# Patient Record
Sex: MALE | Race: WHITE | NOT HISPANIC OR LATINO | Employment: FULL TIME | URBAN - METROPOLITAN AREA
[De-identification: names, ages, dates, MRNs, and addresses within clinical notes are randomized per-mention and may not be internally consistent; named-entity substitution may affect disease eponyms.]

---

## 2023-03-14 PROBLEM — R94.31 ABNORMAL EKG: Status: ACTIVE | Noted: 2023-03-14

## 2023-03-14 PROBLEM — R11.0 NAUSEA: Status: ACTIVE | Noted: 2023-03-14

## 2023-03-14 PROBLEM — G56.22 ENTRAPMENT OF LEFT ULNAR NERVE: Status: ACTIVE | Noted: 2023-03-14

## 2023-03-14 PROBLEM — K82.9 GALLBLADDER PAIN: Status: ACTIVE | Noted: 2023-03-14

## 2023-03-14 PROBLEM — G56.02 LEFT CARPAL TUNNEL SYNDROME: Status: ACTIVE | Noted: 2023-03-14

## 2023-03-14 PROBLEM — K13.79 MOUTH BLEEDING: Status: ACTIVE | Noted: 2023-03-14

## 2023-03-14 PROBLEM — M25.512 LEFT SHOULDER PAIN: Status: ACTIVE | Noted: 2023-03-14

## 2023-03-14 PROBLEM — H02.409 PTOSIS: Status: ACTIVE | Noted: 2023-03-14

## 2023-03-14 PROBLEM — S09.90XA HEAD TRAUMA: Status: ACTIVE | Noted: 2023-03-14

## 2023-03-14 PROBLEM — G62.9 PERIPHERAL POLYNEUROPATHY: Status: ACTIVE | Noted: 2023-03-14

## 2023-03-14 PROBLEM — T14.90XA TRAUMA: Status: ACTIVE | Noted: 2023-03-14

## 2023-03-14 PROBLEM — R07.9 CHEST PAIN: Status: ACTIVE | Noted: 2023-03-14

## 2023-03-14 PROBLEM — G62.9 PERIPHERAL NEUROPATHY: Status: ACTIVE | Noted: 2023-03-14

## 2023-03-14 PROBLEM — M79.622 LEFT UPPER ARM PAIN: Status: ACTIVE | Noted: 2023-03-14

## 2023-03-14 PROBLEM — D22.9 NUMEROUS MOLES: Status: ACTIVE | Noted: 2023-03-14

## 2023-03-14 PROBLEM — R60.9 EDEMA: Status: ACTIVE | Noted: 2023-03-14

## 2023-03-14 PROBLEM — I10 HYPERTENSION: Status: ACTIVE | Noted: 2023-03-14

## 2023-03-14 PROBLEM — R00.0 TACHYCARDIA: Status: ACTIVE | Noted: 2023-03-14

## 2023-03-14 PROBLEM — K57.92 DIVERTICULITIS: Status: ACTIVE | Noted: 2023-03-14

## 2023-03-14 PROBLEM — G90.50 RSD (REFLEX SYMPATHETIC DYSTROPHY): Status: ACTIVE | Noted: 2023-03-14

## 2023-03-14 PROBLEM — K21.9 GERD (GASTROESOPHAGEAL REFLUX DISEASE): Status: ACTIVE | Noted: 2023-03-14

## 2023-03-14 PROBLEM — R42 DIZZINESS: Status: ACTIVE | Noted: 2023-03-14

## 2023-03-14 PROBLEM — M54.9 BACK PAIN: Status: ACTIVE | Noted: 2023-03-14

## 2023-03-14 PROBLEM — E03.9 HYPOTHYROIDISM: Status: ACTIVE | Noted: 2023-03-14

## 2023-03-14 PROBLEM — R00.2 PALPITATIONS: Status: ACTIVE | Noted: 2023-03-14

## 2023-03-14 PROBLEM — U07.1 DISEASE DUE TO SEVERE ACUTE RESPIRATORY SYNDROME CORONAVIRUS 2 (SARS-COV-2): Status: ACTIVE | Noted: 2023-03-14

## 2023-03-14 PROBLEM — J18.9 PNEUMONIA: Status: ACTIVE | Noted: 2023-03-14

## 2023-03-14 PROBLEM — R10.9 ABDOMINAL PAIN: Status: ACTIVE | Noted: 2023-03-14

## 2023-03-14 PROBLEM — M54.2 NECK PAIN: Status: ACTIVE | Noted: 2023-03-14

## 2023-03-14 PROBLEM — F41.0 PANIC ATTACKS: Status: ACTIVE | Noted: 2023-03-14

## 2023-03-14 PROBLEM — R51.9 HEADACHE: Status: ACTIVE | Noted: 2023-03-14

## 2023-03-14 PROBLEM — I73.9 CLAUDICATION (CMS-HCC): Status: ACTIVE | Noted: 2023-03-14

## 2023-03-14 PROBLEM — R31.9 HEMATURIA: Status: ACTIVE | Noted: 2023-03-14

## 2023-03-14 PROBLEM — A69.20 LYME DISEASE: Status: ACTIVE | Noted: 2023-03-14
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
## 2023-03-14 PROBLEM — R05.9 COUGH: Status: ACTIVE | Noted: 2023-03-14

## 2023-03-14 PROBLEM — I48.92 ATRIAL FLUTTER (MULTI): Status: ACTIVE | Noted: 2023-03-14

## 2023-03-14 PROBLEM — E07.9 THYROID DISEASE: Status: ACTIVE | Noted: 2023-03-14

## 2023-03-14 PROBLEM — M79.673 FOOT PAIN: Status: ACTIVE | Noted: 2023-03-14

## 2023-03-14 PROBLEM — K13.70 MOUTH LESION: Status: ACTIVE | Noted: 2023-03-14

## 2023-03-14 PROBLEM — R06.02 SHORTNESS OF BREATH AT REST: Status: ACTIVE | Noted: 2023-03-14

## 2023-03-14 RX ORDER — OMEPRAZOLE 40 MG/1
40 CAPSULE, DELAYED RELEASE ORAL DAILY
COMMUNITY
End: 2023-05-19 | Stop reason: SDUPTHER

## 2023-03-14 RX ORDER — LEVOTHYROXINE SODIUM 75 UG/1
75 TABLET ORAL DAILY
COMMUNITY
End: 2023-04-25 | Stop reason: SDUPTHER

## 2023-03-14 RX ORDER — METOPROLOL SUCCINATE 100 MG/1
100 TABLET, EXTENDED RELEASE ORAL DAILY
COMMUNITY

## 2023-03-15 ENCOUNTER — OFFICE VISIT (OUTPATIENT)
Dept: PRIMARY CARE | Facility: CLINIC | Age: 58
End: 2023-03-15
Payer: COMMERCIAL

## 2023-03-15 VITALS
DIASTOLIC BLOOD PRESSURE: 88 MMHG | HEIGHT: 72 IN | WEIGHT: 228.6 LBS | BODY MASS INDEX: 30.96 KG/M2 | SYSTOLIC BLOOD PRESSURE: 142 MMHG

## 2023-03-15 DIAGNOSIS — K80.63 GALLBLADDER & BILE DUCT STONE, ACUTE CHOLECYSTITIS AND OBSTRUCTION: ICD-10-CM

## 2023-03-15 DIAGNOSIS — E78.00 PURE HYPERCHOLESTEROLEMIA: Primary | ICD-10-CM

## 2023-03-15 DIAGNOSIS — I10 PRIMARY HYPERTENSION: ICD-10-CM

## 2023-03-15 PROCEDURE — 3077F SYST BP >= 140 MM HG: CPT | Performed by: INTERNAL MEDICINE

## 2023-03-15 PROCEDURE — 99214 OFFICE O/P EST MOD 30 MIN: CPT | Performed by: INTERNAL MEDICINE

## 2023-03-15 PROCEDURE — 3079F DIAST BP 80-89 MM HG: CPT | Performed by: INTERNAL MEDICINE

## 2023-03-15 RX ORDER — CLONIDINE HYDROCHLORIDE 0.1 MG/1
0.1 TABLET ORAL 2 TIMES DAILY
Qty: 180 TABLET | Refills: 0 | Status: SHIPPED | OUTPATIENT
Start: 2023-03-15 | End: 2024-05-02 | Stop reason: SDUPTHER

## 2023-03-15 RX ORDER — LOSARTAN POTASSIUM 100 MG/1
100 TABLET ORAL DAILY
Qty: 30 TABLET | Refills: 5 | Status: SHIPPED | OUTPATIENT
Start: 2023-03-15 | End: 2023-11-24 | Stop reason: SDUPTHER

## 2023-03-15 NOTE — PROGRESS NOTES
OFFICE NOTE    NAME OF THE PATIENT: Connor Farr    YOB: 1965    CHIEF COMPLAINT:  This gentleman today came here for multiple medical issues.  His blood pressure is high.  He has a home blood pressure monitor.  He did blood pressure check because he had a headache and he came for follow-up.  He is on blood thinner.  Appetite and weight are okay.  No problem.    PAST MEDICAL HISTORY:  Reviewed on EMR, unchanged.    CURRENT MEDICATIONS:  Reviewed on EMR, unchanged.  List reviewed.    ALLERGIES:  Reviewed on EMR, unchanged.    SOCIAL HISTORY:  Reviewed on EMR, unchanged.  He smokes a few cigarettes.  No history of alcohol or drug abuse.    FAMILY HISTORY:  Reviewed on EMR, unchanged.    REVIEW OF SYSTEMS:  All 12 systems reviewed and pertaining covered in history and physical.    PHYSICAL EXAMINATION  VITAL SIGNS:  As recorded and reviewed from EMR.  RESPIRATORY:  The patient had normal inspirations and expirations.  The breath sounds were equal bilaterally and clear to auscultation.  CARDIOVASCULAR:  The patient had S1 normal, split S2 without obvious rubs, clicks, or murmurs.    GASTROINTESTINAL:  There was no hepatosplenomegaly.  There were no palpable masses and no inguinal nodes.  EXTREMITIES:  Legs had no edema.  NEUROLOGIC:  The patient had normal cranial nerves.  The reflexes, sensory, and motor examination were grossly within normal limits.    LAB WORK:  Laboratory testing discussed.    ASSESSMENT AND PLAN:  Hypertension.  Metoprolol in the morning.  I made losartan 100 mg.  Anticoagulation.  He is on Xarelto.  High cholesterol, on medication.  High blood pressure, episodic.  I am going to add a little clonidine if blood pressure is high.  I shall see him back in about a couple of weeks with repeat blood work.    Kindly review this note in conjunction with EMR.     Subjective   Patient ID: Connor Farr is a 57 y.o. male who presents for Follow-up.      HPI    Review of  Systems    Objective   /88   Ht 1.829 m (6')   Wt 104 kg (228 lb 9.6 oz)   BMI 31.00 kg/m²       Physical Exam    Assessment/Plan   Problem List Items Addressed This Visit    None         Walk in

## 2023-04-25 DIAGNOSIS — E03.9 HYPOTHYROIDISM, UNSPECIFIED TYPE: ICD-10-CM

## 2023-04-25 RX ORDER — LEVOTHYROXINE SODIUM 75 UG/1
75 TABLET ORAL DAILY
Qty: 30 TABLET | Refills: 0 | Status: SHIPPED | OUTPATIENT
Start: 2023-04-25 | End: 2023-06-12 | Stop reason: SDUPTHER

## 2023-05-19 DIAGNOSIS — K80.63 GALLBLADDER & BILE DUCT STONE, ACUTE CHOLECYSTITIS AND OBSTRUCTION: ICD-10-CM

## 2023-05-19 RX ORDER — OMEPRAZOLE 40 MG/1
40 CAPSULE, DELAYED RELEASE ORAL DAILY
Qty: 90 CAPSULE | Refills: 0 | Status: SHIPPED | OUTPATIENT
Start: 2023-05-19 | End: 2023-09-08 | Stop reason: SDUPTHER

## 2023-06-12 ENCOUNTER — OFFICE VISIT (OUTPATIENT)
Dept: PRIMARY CARE | Facility: CLINIC | Age: 58
End: 2023-06-12
Payer: COMMERCIAL

## 2023-06-12 VITALS
BODY MASS INDEX: 32.02 KG/M2 | SYSTOLIC BLOOD PRESSURE: 142 MMHG | HEIGHT: 72 IN | WEIGHT: 236.4 LBS | DIASTOLIC BLOOD PRESSURE: 90 MMHG

## 2023-06-12 DIAGNOSIS — E78.00 PURE HYPERCHOLESTEROLEMIA: ICD-10-CM

## 2023-06-12 DIAGNOSIS — E55.9 VITAMIN D DEFICIENCY: ICD-10-CM

## 2023-06-12 DIAGNOSIS — Z12.5 ENCOUNTER FOR PROSTATE CANCER SCREENING: ICD-10-CM

## 2023-06-12 DIAGNOSIS — G89.29 OTHER CHRONIC PAIN: ICD-10-CM

## 2023-06-12 DIAGNOSIS — M79.673 PAIN OF FOOT, UNSPECIFIED LATERALITY: ICD-10-CM

## 2023-06-12 DIAGNOSIS — E03.9 HYPOTHYROIDISM, UNSPECIFIED TYPE: ICD-10-CM

## 2023-06-12 DIAGNOSIS — R53.83 OTHER FATIGUE: ICD-10-CM

## 2023-06-12 DIAGNOSIS — H44.009 EYE INFECTION, UNSPECIFIED LATERALITY: ICD-10-CM

## 2023-06-12 DIAGNOSIS — I10 PRIMARY HYPERTENSION: ICD-10-CM

## 2023-06-12 DIAGNOSIS — M25.569 KNEE PAIN, UNSPECIFIED CHRONICITY, UNSPECIFIED LATERALITY: ICD-10-CM

## 2023-06-12 DIAGNOSIS — B35.1 NAIL FUNGUS: ICD-10-CM

## 2023-06-12 PROCEDURE — 3077F SYST BP >= 140 MM HG: CPT | Performed by: INTERNAL MEDICINE

## 2023-06-12 PROCEDURE — 99214 OFFICE O/P EST MOD 30 MIN: CPT | Performed by: INTERNAL MEDICINE

## 2023-06-12 PROCEDURE — 3080F DIAST BP >= 90 MM HG: CPT | Performed by: INTERNAL MEDICINE

## 2023-06-12 RX ORDER — EFINACONAZOLE 100 MG/ML
8 SOLUTION TOPICAL DAILY
Qty: 8 ML | Refills: 0 | Status: SHIPPED | OUTPATIENT
Start: 2023-06-12 | End: 2024-05-27 | Stop reason: WASHOUT

## 2023-06-12 RX ORDER — LEVOTHYROXINE SODIUM 75 UG/1
75 TABLET ORAL DAILY
Qty: 90 TABLET | Refills: 0 | Status: SHIPPED | OUTPATIENT
Start: 2023-06-12 | End: 2023-10-05 | Stop reason: SDUPTHER

## 2023-06-12 RX ORDER — DOXYCYCLINE 100 MG/1
100 CAPSULE ORAL 2 TIMES DAILY
Qty: 14 CAPSULE | Refills: 0 | Status: SHIPPED | OUTPATIENT
Start: 2023-06-12 | End: 2023-06-19

## 2023-06-12 RX ORDER — ERYTHROMYCIN 5 MG/G
OINTMENT OPHTHALMIC 4 TIMES DAILY
Qty: 3.5 G | Refills: 0 | Status: SHIPPED | OUTPATIENT
Start: 2023-06-12 | End: 2023-06-19

## 2023-06-12 NOTE — PROGRESS NOTES
OFFICE NOTE    NAME OF THE PATIENT: Connor Farr     YOB: 1965    CHIEF COMPLAINT:  Connor today came here for multiple medical issues.  He is very concerned, a lot of arthritis in the knee, hurting.  Difficulty in walking.  Both feet pain.  He has nail infection.  Follow-up on other conditions.  He got the pain and swelling in left eye lower eyelid.  There is some swelling, looks like a stye.  Itchy and scratchy, not feeling good.  He came for follow-up on various conditions.    PAST MEDICAL HISTORY:  Reviewed on EMR, unchanged.    CURRENT MEDICATIONS:  Reviewed on EMR, unchanged.  Losartan, metoprolol, Synthroid.    ALLERGIES:  Reviewed on EMR, unchanged.    SOCIAL HISTORY:  Reviewed on EMR, unchanged.  He does not smoke, does not drink alcohol.    FAMILY HISTORY:  Reviewed on EMR, unchanged.    REVIEW OF SYSTEMS:  All 12 systems reviewed and pertaining covered in history and physical.    PHYSICAL EXAMINATION  VITAL SIGNS:  As recorded and reviewed from EMR.  EYES:  Left eye lower eyelid looks like a stye.  ENT:  The patient's external ears were normal and the otoscopic examination was negative.  RESPIRATORY:  The patient had normal inspirations and expirations.  The breath sounds were equal bilaterally and clear to auscultation.  CARDIOVASCULAR:  The patient had S1 normal, split S2 without obvious rubs, clicks, or murmurs.    GASTROINTESTINAL:  There was no hepatosplenomegaly.  There were no palpable masses and no inguinal nodes.  EXTREMITIES:  Both the feet Athlete's foot present, nail fungus.  Knee swelling and tenderness.  Movements painful.  NEUROLOGIC:  The patient had normal cranial nerves.  The reflexes, sensory, and motor examination were grossly within normal limits.    LAB WORK:  Laboratory testing discussed.    ASSESSMENT AND PLAN:  Knee pain, arthritis.  Referred to Orthopedic.  Nail fungus.  We will try some nail polish Jublia, see if that helps.  Foot pain.  Podiatrist.  Chronic  pain syndrome.  Referred to Dr. Paulson.  Mayi in the eye.  Erythromycin, doxycycline.  Hypertension, okay.  High cholesterol, stable.  Complete blood work ordered.  I shall see him back in a week after testing.    Kindly review this note in conjunction with EMR.     Subjective   Patient ID: Connor Farr is a 57 y.o. male who presents for No chief complaint on file..      HPI    Review of Systems    Objective   /90   Ht 1.829 m (6')   Wt 107 kg (236 lb 6.4 oz)   BMI 32.06 kg/m²       Physical Exam    Assessment/Plan   Problem List Items Addressed This Visit    None

## 2023-06-23 DIAGNOSIS — B35.1 NAIL FUNGUS: ICD-10-CM

## 2023-06-24 ENCOUNTER — LAB (OUTPATIENT)
Dept: LAB | Facility: LAB | Age: 58
End: 2023-06-24
Payer: COMMERCIAL

## 2023-06-24 DIAGNOSIS — Z12.5 ENCOUNTER FOR PROSTATE CANCER SCREENING: ICD-10-CM

## 2023-06-24 DIAGNOSIS — E03.9 HYPOTHYROIDISM, UNSPECIFIED TYPE: ICD-10-CM

## 2023-06-24 DIAGNOSIS — E55.9 VITAMIN D DEFICIENCY: ICD-10-CM

## 2023-06-24 DIAGNOSIS — R53.83 OTHER FATIGUE: ICD-10-CM

## 2023-06-24 DIAGNOSIS — E78.00 PURE HYPERCHOLESTEROLEMIA: ICD-10-CM

## 2023-06-24 DIAGNOSIS — I10 PRIMARY HYPERTENSION: ICD-10-CM

## 2023-06-24 LAB
ALANINE AMINOTRANSFERASE (SGPT) (U/L) IN SER/PLAS: 20 U/L (ref 10–52)
ALBUMIN (G/DL) IN SER/PLAS: 4.3 G/DL (ref 3.4–5)
ALKALINE PHOSPHATASE (U/L) IN SER/PLAS: 85 U/L (ref 33–120)
ANION GAP IN SER/PLAS: 12 MMOL/L (ref 10–20)
APPEARANCE, URINE: CLEAR
ASPARTATE AMINOTRANSFERASE (SGOT) (U/L) IN SER/PLAS: 19 U/L (ref 9–39)
BILIRUBIN TOTAL (MG/DL) IN SER/PLAS: 0.6 MG/DL (ref 0–1.2)
BILIRUBIN, URINE: NEGATIVE
BLOOD, URINE: NEGATIVE
CALCIDIOL (25 OH VITAMIN D3) (NG/ML) IN SER/PLAS: 12 NG/ML
CALCIUM (MG/DL) IN SER/PLAS: 9.8 MG/DL (ref 8.6–10.6)
CARBON DIOXIDE, TOTAL (MMOL/L) IN SER/PLAS: 28 MMOL/L (ref 21–32)
CHLORIDE (MMOL/L) IN SER/PLAS: 101 MMOL/L (ref 98–107)
CHOLESTEROL (MG/DL) IN SER/PLAS: 235 MG/DL (ref 0–199)
CHOLESTEROL IN HDL (MG/DL) IN SER/PLAS: 35.4 MG/DL
CHOLESTEROL/HDL RATIO: 6.6
COBALAMIN (VITAMIN B12) (PG/ML) IN SER/PLAS: 321 PG/ML (ref 211–911)
COLOR, URINE: YELLOW
CREATININE (MG/DL) IN SER/PLAS: 0.68 MG/DL (ref 0.5–1.3)
ERYTHROCYTE DISTRIBUTION WIDTH (RATIO) BY AUTOMATED COUNT: 12.6 % (ref 11.5–14.5)
ERYTHROCYTE MEAN CORPUSCULAR HEMOGLOBIN CONCENTRATION (G/DL) BY AUTOMATED: 33.7 G/DL (ref 32–36)
ERYTHROCYTE MEAN CORPUSCULAR VOLUME (FL) BY AUTOMATED COUNT: 95 FL (ref 80–100)
ERYTHROCYTES (10*6/UL) IN BLOOD BY AUTOMATED COUNT: 4.91 X10E12/L (ref 4.5–5.9)
GFR MALE: >90 ML/MIN/1.73M2
GLUCOSE (MG/DL) IN SER/PLAS: 121 MG/DL (ref 74–99)
GLUCOSE, URINE: NEGATIVE MG/DL
HEMATOCRIT (%) IN BLOOD BY AUTOMATED COUNT: 46.6 % (ref 41–52)
HEMOGLOBIN (G/DL) IN BLOOD: 15.7 G/DL (ref 13.5–17.5)
KETONES, URINE: NEGATIVE MG/DL
LDL: 160 MG/DL (ref 0–99)
LEUKOCYTE ESTERASE, URINE: NEGATIVE
LEUKOCYTES (10*3/UL) IN BLOOD BY AUTOMATED COUNT: 9.7 X10E9/L (ref 4.4–11.3)
NITRITE, URINE: NEGATIVE
NRBC (PER 100 WBCS) BY AUTOMATED COUNT: 0 /100 WBC (ref 0–0)
PH, URINE: 5 (ref 5–8)
PLATELETS (10*3/UL) IN BLOOD AUTOMATED COUNT: 254 X10E9/L (ref 150–450)
POTASSIUM (MMOL/L) IN SER/PLAS: 5.2 MMOL/L (ref 3.5–5.3)
PROSTATE SPECIFIC ANTIGEN,SCREEN: 0.35 NG/ML (ref 0–4)
PROTEIN TOTAL: 7.3 G/DL (ref 6.4–8.2)
PROTEIN, URINE: NEGATIVE MG/DL
SODIUM (MMOL/L) IN SER/PLAS: 136 MMOL/L (ref 136–145)
SPECIFIC GRAVITY, URINE: 1.01 (ref 1–1.03)
THYROTROPIN (MIU/L) IN SER/PLAS BY DETECTION LIMIT <= 0.05 MIU/L: 1.59 MIU/L (ref 0.44–3.98)
TRIGLYCERIDE (MG/DL) IN SER/PLAS: 199 MG/DL (ref 0–149)
UREA NITROGEN (MG/DL) IN SER/PLAS: 8 MG/DL (ref 6–23)
UROBILINOGEN, URINE: <2 MG/DL (ref 0–1.9)
VLDL: 40 MG/DL (ref 0–40)

## 2023-06-24 PROCEDURE — 85027 COMPLETE CBC AUTOMATED: CPT

## 2023-06-24 PROCEDURE — 82306 VITAMIN D 25 HYDROXY: CPT

## 2023-06-24 PROCEDURE — 84153 ASSAY OF PSA TOTAL: CPT

## 2023-06-24 PROCEDURE — 36415 COLL VENOUS BLD VENIPUNCTURE: CPT

## 2023-06-24 PROCEDURE — 84443 ASSAY THYROID STIM HORMONE: CPT

## 2023-06-24 PROCEDURE — 80053 COMPREHEN METABOLIC PANEL: CPT

## 2023-06-24 PROCEDURE — 82607 VITAMIN B-12: CPT

## 2023-06-24 PROCEDURE — 81003 URINALYSIS AUTO W/O SCOPE: CPT

## 2023-06-24 PROCEDURE — 80061 LIPID PANEL: CPT

## 2023-06-30 ENCOUNTER — TELEPHONE (OUTPATIENT)
Dept: PRIMARY CARE | Facility: CLINIC | Age: 58
End: 2023-06-30
Payer: COMMERCIAL

## 2023-06-30 NOTE — TELEPHONE ENCOUNTER
----- Message from Roseanna Esqueda MD sent at 6/25/2023  9:01 PM EDT -----  Make an appt 5 - 7 days

## 2023-07-03 DIAGNOSIS — G62.89 OTHER POLYNEUROPATHY: ICD-10-CM

## 2023-07-18 ENCOUNTER — OFFICE VISIT (OUTPATIENT)
Dept: PRIMARY CARE | Facility: CLINIC | Age: 58
End: 2023-07-18
Payer: COMMERCIAL

## 2023-07-18 VITALS
DIASTOLIC BLOOD PRESSURE: 82 MMHG | HEIGHT: 74 IN | WEIGHT: 233.6 LBS | BODY MASS INDEX: 29.98 KG/M2 | SYSTOLIC BLOOD PRESSURE: 148 MMHG

## 2023-07-18 DIAGNOSIS — R73.03 PRE-DIABETES: ICD-10-CM

## 2023-07-18 DIAGNOSIS — E78.00 PURE HYPERCHOLESTEROLEMIA: ICD-10-CM

## 2023-07-18 DIAGNOSIS — R10.84 GENERALIZED ABDOMINAL PAIN: ICD-10-CM

## 2023-07-18 PROBLEM — M25.561 KNEE PAIN, BILATERAL: Status: ACTIVE | Noted: 2023-07-18

## 2023-07-18 PROBLEM — R20.0 NUMBNESS: Status: ACTIVE | Noted: 2023-07-18

## 2023-07-18 PROBLEM — M25.562 KNEE PAIN, BILATERAL: Status: ACTIVE | Noted: 2023-07-18

## 2023-07-18 PROCEDURE — 3079F DIAST BP 80-89 MM HG: CPT | Performed by: INTERNAL MEDICINE

## 2023-07-18 PROCEDURE — 3077F SYST BP >= 140 MM HG: CPT | Performed by: INTERNAL MEDICINE

## 2023-07-18 PROCEDURE — 99214 OFFICE O/P EST MOD 30 MIN: CPT | Performed by: INTERNAL MEDICINE

## 2023-07-18 NOTE — PROGRESS NOTES
OFFICE NOTE    NAME OF THE PATIENT: Connor Farr     YOB: 1965    CHIEF COMPLAINT:  This gentleman today came here for abdominal pain, dyspnea on exertion, not feeling good for no good reason.  No fall, trauma, or injury.  He came here for follow-up on various conditions.  It is getting bigger.  Follow-up on blood work.    PAST MEDICAL HISTORY:  Reviewed on EMR, unchanged.    CURRENT MEDICATIONS:  Reviewed on EMR, unchanged.  List reviewed.    ALLERGIES:  Reviewed on EMR, unchanged.    SOCIAL HISTORY:  Reviewed on EMR, unchanged.    FAMILY HISTORY:  Reviewed on EMR, unchanged.    REVIEW OF SYSTEMS:  All 12 systems reviewed and pertaining covered in history and physical.    PHYSICAL EXAMINATION  VITAL SIGNS:  As recorded and reviewed from EMR.  EYES:  The patient's sclerae white.  The pupils were equal and round.  ENT:  The patient's external ears were normal and the otoscopic examination was negative.  RESPIRATORY:  The patient had normal inspirations and expirations.  The breath sounds were equal bilaterally and clear to auscultation.  CARDIOVASCULAR:  The patient had S1 normal, split S2 without obvious rubs, clicks, or murmurs.    GASTROINTESTINAL:  There was no hepatosplenomegaly.  There were no palpable masses and no inguinal nodes.  Bowel sounds present.  EXTREMITIES:  Legs had no edema.  NEUROLOGIC:  The patient had normal cranial nerves.  The reflexes, sensory, and motor examination were grossly within normal limits.    LAB WORK:  Laboratory testing discussed.    ASSESSMENT AND PLAN:  Abdominal distension, not feeling good.  I ordered CT scan of the abdomen and pelvis.  Hyperglycemia and possible diabetes.  We will check hemoglobin A1c.  Low vitamin D.  Given vitamin D.  Anxiety and stress, okay.  Follow up in a week after test.      Kindly review this note in conjunction with EMR.   Subjective   Patient ID: Connor Farr is a 57 y.o. male who presents for Follow-up (Stomach  "swollen).      HPI    Review of Systems    Objective   /82   Ht 1.88 m (6' 2\")   Wt 106 kg (233 lb 9.6 oz)   BMI 29.99 kg/m²       Physical Exam    Assessment/Plan   Problem List Items Addressed This Visit    None        "

## 2023-08-28 ENCOUNTER — LAB (OUTPATIENT)
Dept: LAB | Facility: LAB | Age: 58
End: 2023-08-28
Payer: COMMERCIAL

## 2023-08-28 DIAGNOSIS — E78.00 PURE HYPERCHOLESTEROLEMIA: ICD-10-CM

## 2023-08-28 DIAGNOSIS — R73.03 PRE-DIABETES: ICD-10-CM

## 2023-08-28 LAB
ALANINE AMINOTRANSFERASE (SGPT) (U/L) IN SER/PLAS: 19 U/L (ref 10–52)
ALBUMIN (G/DL) IN SER/PLAS: 4.6 G/DL (ref 3.4–5)
ALKALINE PHOSPHATASE (U/L) IN SER/PLAS: 92 U/L (ref 33–120)
ANION GAP IN SER/PLAS: 15 MMOL/L (ref 10–20)
ASPARTATE AMINOTRANSFERASE (SGOT) (U/L) IN SER/PLAS: 17 U/L (ref 9–39)
BILIRUBIN TOTAL (MG/DL) IN SER/PLAS: 0.7 MG/DL (ref 0–1.2)
CALCIUM (MG/DL) IN SER/PLAS: 10 MG/DL (ref 8.6–10.6)
CARBON DIOXIDE, TOTAL (MMOL/L) IN SER/PLAS: 27 MMOL/L (ref 21–32)
CHLORIDE (MMOL/L) IN SER/PLAS: 98 MMOL/L (ref 98–107)
CREATININE (MG/DL) IN SER/PLAS: 0.82 MG/DL (ref 0.5–1.3)
ESTIMATED AVERAGE GLUCOSE FOR HBA1C: 134 MG/DL
GFR MALE: >90 ML/MIN/1.73M2
GLUCOSE (MG/DL) IN SER/PLAS: 90 MG/DL (ref 74–99)
HEMOGLOBIN A1C/HEMOGLOBIN TOTAL IN BLOOD: 6.3 %
POTASSIUM (MMOL/L) IN SER/PLAS: 4.4 MMOL/L (ref 3.5–5.3)
PROTEIN TOTAL: 7.7 G/DL (ref 6.4–8.2)
SODIUM (MMOL/L) IN SER/PLAS: 136 MMOL/L (ref 136–145)
UREA NITROGEN (MG/DL) IN SER/PLAS: 8 MG/DL (ref 6–23)

## 2023-08-28 PROCEDURE — 80053 COMPREHEN METABOLIC PANEL: CPT

## 2023-08-28 PROCEDURE — 83036 HEMOGLOBIN GLYCOSYLATED A1C: CPT

## 2023-08-28 PROCEDURE — 36415 COLL VENOUS BLD VENIPUNCTURE: CPT

## 2023-08-31 ENCOUNTER — TELEPHONE (OUTPATIENT)
Dept: PRIMARY CARE | Facility: CLINIC | Age: 58
End: 2023-08-31
Payer: COMMERCIAL

## 2023-08-31 NOTE — TELEPHONE ENCOUNTER
voicemail box hasn't been set up yet, just wanted to let him know his HA1C was high and Dr. Esqueda wanted to see him in a week.

## 2023-09-08 ENCOUNTER — TELEPHONE (OUTPATIENT)
Dept: PRIMARY CARE | Facility: CLINIC | Age: 58
End: 2023-09-08
Payer: COMMERCIAL

## 2023-09-08 DIAGNOSIS — K80.63 GALLBLADDER & BILE DUCT STONE, ACUTE CHOLECYSTITIS AND OBSTRUCTION: ICD-10-CM

## 2023-09-08 RX ORDER — OMEPRAZOLE 40 MG/1
40 CAPSULE, DELAYED RELEASE ORAL DAILY
Qty: 90 CAPSULE | Refills: 0 | Status: SHIPPED | OUTPATIENT
Start: 2023-09-08 | End: 2024-01-19 | Stop reason: SDUPTHER

## 2023-09-22 ENCOUNTER — TELEMEDICINE (OUTPATIENT)
Dept: PRIMARY CARE | Facility: CLINIC | Age: 58
End: 2023-09-22
Payer: COMMERCIAL

## 2023-09-22 DIAGNOSIS — F41.9 ANXIETY: ICD-10-CM

## 2023-09-22 DIAGNOSIS — K76.0 FATTY LIVER: ICD-10-CM

## 2023-09-22 DIAGNOSIS — J44.9 CHRONIC OBSTRUCTIVE PULMONARY DISEASE, UNSPECIFIED COPD TYPE (MULTI): ICD-10-CM

## 2023-09-22 DIAGNOSIS — K57.90 DIVERTICULOSIS: Primary | ICD-10-CM

## 2023-09-22 DIAGNOSIS — I10 HYPERTENSION, UNSPECIFIED TYPE: ICD-10-CM

## 2023-09-22 PROCEDURE — 99213 OFFICE O/P EST LOW 20 MIN: CPT | Performed by: INTERNAL MEDICINE

## 2023-09-22 ASSESSMENT — ENCOUNTER SYMPTOMS
LOSS OF SENSATION IN FEET: 0
OCCASIONAL FEELINGS OF UNSTEADINESS: 0
DEPRESSION: 0

## 2023-09-22 NOTE — PROGRESS NOTES
Subjective   Patient ID: Connor Farr is a 57 y.o. male who presents for Follow-up (multiple medical issues).    Connor Farr today came here for multiple medical issues.  He is on virtual visit.  Abdominal pain is much better, feeling okay.  No problem.  He had a blood work taken.  He came for follow-up on CT scan.    I have personally reviewed the patient's Past Medical History, Medications, Allergies, Social History, and Family History in the EMR.    Review of Systems   All other systems reviewed and are negative.    Objective   There were no vitals taken for this visit.    Physical Exam  Virtual exam.  Appetite and weight are okay.  Legs had no edema.  Feeling okay.    LAB WORK: Laboratory testing discussed.    Assessment/Plan   Problem List Items Addressed This Visit          Cardiac and Vasculature    Hypertension     Other Visit Diagnoses       Diverticulosis    -  Primary    Fatty liver        Anxiety        Chronic obstructive pulmonary disease, unspecified COPD type (CMS/HCC)            1. CT scan revealed diverticulosis.  Monitor.  Colonoscopy.  2. Fatty liver.  Monitor.  3. Blood pressure, okay.  4. Anxiety question answered.  5. COPD.  Monitor.  4. Follow-up appointment with me in two months.    Scribe Attestation  By signing my name below, I, Jack Stuart   attest that this documentation has been prepared under the direction and in the presence of Roseanna Esqueda MD.

## 2023-10-05 DIAGNOSIS — E03.9 HYPOTHYROIDISM, UNSPECIFIED TYPE: ICD-10-CM

## 2023-10-05 RX ORDER — LEVOTHYROXINE SODIUM 75 UG/1
75 TABLET ORAL DAILY
Qty: 90 TABLET | Refills: 0 | Status: SHIPPED | OUTPATIENT
Start: 2023-10-05 | End: 2024-01-19 | Stop reason: SDUPTHER

## 2023-10-17 ENCOUNTER — OFFICE VISIT (OUTPATIENT)
Dept: PAIN MEDICINE | Facility: CLINIC | Age: 58
End: 2023-10-17
Payer: COMMERCIAL

## 2023-10-17 VITALS
BODY MASS INDEX: 28.23 KG/M2 | DIASTOLIC BLOOD PRESSURE: 87 MMHG | HEIGHT: 74 IN | RESPIRATION RATE: 16 BRPM | SYSTOLIC BLOOD PRESSURE: 128 MMHG | WEIGHT: 220 LBS | HEART RATE: 87 BPM

## 2023-10-17 DIAGNOSIS — G62.1 ALCOHOL-INDUCED POLYNEUROPATHY (MULTI): Primary | ICD-10-CM

## 2023-10-17 PROCEDURE — 3079F DIAST BP 80-89 MM HG: CPT | Performed by: PHYSICAL MEDICINE & REHABILITATION

## 2023-10-17 PROCEDURE — 3074F SYST BP LT 130 MM HG: CPT | Performed by: PHYSICAL MEDICINE & REHABILITATION

## 2023-10-17 PROCEDURE — 99213 OFFICE O/P EST LOW 20 MIN: CPT | Performed by: PHYSICAL MEDICINE & REHABILITATION

## 2023-10-17 ASSESSMENT — PAIN SCALES - GENERAL: PAINLEVEL: 7

## 2023-10-17 NOTE — ASSESSMENT & PLAN NOTE
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  Unfortunately patient had just started his gabapentin about a week ago rather than when I had seen him about 6 weeks ago however he is already starting to have improvement with the 300 mg at night.  I gave him the titration schedule again to hopefully get him up to 600 mg 3 times a day and I am optimistic that he will continue to have benefit as he titrates this medication up.  I did reassure him that this medication is generally overall very safe and does not have any long-term significant negative facts on any of his organs or have any issues with tolerance and/or dependence.  He may follow-up with me as needed

## 2023-10-17 NOTE — PROGRESS NOTES
Subjective   Patient ID: Connor Farr is a 57 y.o. male who presents for Leg Pain.  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  Since last visit he had started gabapentin however he only started about a week ago he is currently at 300 mg at night.  He did lose the titration schedule we have given him.  However even with 300 mg at night he is actually starting to notice some relief especially when he wakes up in the morning as he is not having a significant feelings of walking on glass.  Otherwise similar symptoms as previous evaluation though again as above improved on initial dosing of gabapentin.                  Review of Systems   All other systems reviewed and are negative.       Current Outpatient Medications   Medication Instructions    cloNIDine (CATAPRES) 0.1 mg, oral, 2 times daily    efinaconazole (Jublia) 10 % solution with applicator 8 mL, topical (top), Daily    levothyroxine (SYNTHROID, LEVOXYL) 75 mcg, oral, Daily    losartan (COZAAR) 100 mg, oral, Daily    metoprolol succinate XL (TOPROL-XL) 100 mg, oral, Daily    omeprazole (PRILOSEC) 40 mg, oral, Daily        Past Medical History:   Diagnosis Date    Acute sinusitis, unspecified 08/29/2013    Acute sinusitis    Body mass index (BMI) 27.0-27.9, adult     BMI 27.0-27.9,adult    Fatty liver     High cholesterol     Hypertension     Personal history of other diseases of the respiratory system 11/26/2013    History of acute bronchitis    Personal history of other specified conditions 08/01/2013    History of abdominal pain        No past surgical history on file.     Family History   Problem Relation Name Age of Onset    Other (MTHFR mutation) Sister      Hypertension Other FHx         essential    Heart disease Other FHx     Diabetes type II Other FHx         Allergies   Allergen Reactions    Cephalexin Unknown        Objective     Vitals:    10/17/23 1509   BP: 128/87   Pulse:  87   Resp: 16        Physical Exam    GENERAL EXAM  Vital Signs: Vital signs to include heart rate, respiration rate, blood pressure, and temperature were reviewed.  General Appearance:  Awake, alert, healthy appearing, well developed, No acute distress.  Head: Normocephalic without evidence of head injury.  Neck: The appearance of the neck was normal without swelling with a midline trachea.  Eyes: The eyelids and eyebrows exhibited no abnormalities.  Pupils were not pin-point.  Sclera was without icterus.  Lungs: Respiration rhythm and depth was normal.  Respiratory movements were normal without labored breathing.  Cardiovascular: No peripheral edema was present.    Neurological: Patient was oriented to time, place, and person.  Speech was normal.  Balance, gait, and stance were unremarkable.    Psychiatric: Appearance was normal with appropriate dress.  Mood was euthymic and affect was normal.  Skin: Affected regions were without ecchymosis or skin lesions.        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  Unfortunately patient had just started his gabapentin about a week ago rather than when I had seen him about 6 weeks ago however he is already starting to have improvement with the 300 mg at night.  I gave him the titration schedule again to hopefully get him up to 600 mg 3 times a day and I am optimistic that he will continue to have benefit as he titrates this medication up.  I did reassure him that this medication is generally overall very safe and does not have any long-term significant negative facts on any of his organs or have any issues with tolerance and/or dependence.  He may follow-up with me as needed

## 2023-11-21 ENCOUNTER — TELEMEDICINE (OUTPATIENT)
Dept: PRIMARY CARE | Facility: CLINIC | Age: 58
End: 2023-11-21
Payer: COMMERCIAL

## 2023-11-21 DIAGNOSIS — J45.909 ACUTE ASTHMATIC BRONCHITIS (HHS-HCC): Primary | ICD-10-CM

## 2023-11-21 PROCEDURE — 99213 OFFICE O/P EST LOW 20 MIN: CPT | Performed by: INTERNAL MEDICINE

## 2023-11-22 NOTE — PROGRESS NOTES
Subjective   Patient ID: Connor Farr is a 57 y.o. male who presents for virtual visit and Follow-up (various conditions).    Connor Farr today came here for virtual visit.  He has cough, yellow sputum, sinus congestion, bronchitis, headache going on for the last several days.  Over-the-counter medications are not helping.  He came here for follow-up on various conditions.    I have personally reviewed the patient's Past Medical History, Medications, Allergies, Social History, and Family History in the EMR.    Review of Systems   All other systems reviewed and are negative.    Objective   Virtual exam. There were no vitals taken for this visit.    Physical Exam  HENT:      Nose: Congestion present.      Comments: Postnasal drip.     Mouth/Throat:      Comments: Throat congested.  Pulmonary:      Breath sounds: Wheezing present.     LAB WORK: Laboratory testing discussed.  Assessment/Plan   Problem List Items Addressed This Visit    None  Visit Diagnoses         Codes    Acute asthmatic bronchitis    -  Primary J45.909        Acute asthmatic bronchitis and cough.  Levaquin, Claritin, Robitussin, and Flonase.  ______.  Follow up in two weeks if not better.    Scribe Attestation  By signing my name below, I, Jack Stuart attest that this documentation has been prepared under the direction and in the presence of Roseanna Esqueda MD.

## 2023-11-24 DIAGNOSIS — I10 PRIMARY HYPERTENSION: ICD-10-CM

## 2023-11-24 DIAGNOSIS — J45.909 ACUTE ASTHMATIC BRONCHITIS (HHS-HCC): ICD-10-CM

## 2023-11-24 RX ORDER — LEVOFLOXACIN 250 MG/1
250 TABLET ORAL DAILY
Qty: 10 TABLET | Refills: 0 | Status: SHIPPED | OUTPATIENT
Start: 2023-11-24 | End: 2023-12-04

## 2023-11-24 RX ORDER — LORATADINE 10 MG/1
10 TABLET ORAL DAILY
Qty: 30 TABLET | Refills: 0 | Status: SHIPPED | OUTPATIENT
Start: 2023-11-24 | End: 2024-05-02 | Stop reason: SDUPTHER

## 2023-11-24 RX ORDER — LOSARTAN POTASSIUM 100 MG/1
100 TABLET ORAL DAILY
Qty: 30 TABLET | Refills: 5 | Status: SHIPPED | OUTPATIENT
Start: 2023-11-24 | End: 2024-05-02 | Stop reason: SDUPTHER

## 2023-11-24 RX ORDER — FLUTICASONE FUROATE 27.5 UG/1
1 SPRAY, METERED NASAL
Qty: 10 G | Refills: 0 | Status: SHIPPED | OUTPATIENT
Start: 2023-11-24 | End: 2024-05-27 | Stop reason: WASHOUT

## 2023-11-24 RX ORDER — DEXTROMETHORPHAN HYDROBROMIDE, GUAIFENESIN 20; 400 MG/20ML; MG/20ML
5 SOLUTION ORAL 2 TIMES DAILY
Qty: 118 ML | Refills: 0 | Status: SHIPPED | OUTPATIENT
Start: 2023-11-24 | End: 2024-05-27 | Stop reason: WASHOUT

## 2023-11-28 ENCOUNTER — APPOINTMENT (OUTPATIENT)
Dept: PAIN MEDICINE | Facility: CLINIC | Age: 58
End: 2023-11-28
Payer: COMMERCIAL

## 2023-12-14 ENCOUNTER — APPOINTMENT (OUTPATIENT)
Dept: PAIN MEDICINE | Facility: CLINIC | Age: 58
End: 2023-12-14
Payer: COMMERCIAL

## 2024-01-02 ENCOUNTER — OFFICE VISIT (OUTPATIENT)
Dept: PAIN MEDICINE | Facility: CLINIC | Age: 59
End: 2024-01-02
Payer: COMMERCIAL

## 2024-01-02 VITALS — SYSTOLIC BLOOD PRESSURE: 155 MMHG | RESPIRATION RATE: 18 BRPM | DIASTOLIC BLOOD PRESSURE: 97 MMHG | HEART RATE: 80 BPM

## 2024-01-02 DIAGNOSIS — G62.1 ALCOHOL-INDUCED POLYNEUROPATHY (MULTI): Primary | ICD-10-CM

## 2024-01-02 PROCEDURE — 3077F SYST BP >= 140 MM HG: CPT | Performed by: PHYSICAL MEDICINE & REHABILITATION

## 2024-01-02 PROCEDURE — 3080F DIAST BP >= 90 MM HG: CPT | Performed by: PHYSICAL MEDICINE & REHABILITATION

## 2024-01-02 PROCEDURE — 99214 OFFICE O/P EST MOD 30 MIN: CPT | Performed by: PHYSICAL MEDICINE & REHABILITATION

## 2024-01-02 RX ORDER — GABAPENTIN 300 MG/1
CAPSULE ORAL
Qty: 210 CAPSULE | Refills: 2 | Status: SHIPPED | OUTPATIENT
Start: 2024-01-02 | End: 2024-05-27 | Stop reason: WASHOUT

## 2024-01-02 ASSESSMENT — PAIN SCALES - GENERAL: PAINLEVEL: 7

## 2024-01-02 NOTE — ASSESSMENT & PLAN NOTE
58-year-old male with alcohol induced peripheral neuropathy responded fairly well to gabapentin.  I think it be reasonable to get him back on his gabapentin and increase the evening dosing.  Our plan for today:  - Gabapentin 600 in the morning 600 in the afternoon and increase to 900 mg at night.  We can further increase the nighttime dose to 1200 mg.  - Once again counseled patient on the importance of smoking cessation.    Patient will follow-up with me as needed or annually.

## 2024-01-02 NOTE — PROGRESS NOTES
Subjective   Patient ID: Connor Farr is a 58 y.o. male who presents for Leg Pain.  HPI    58-year-old male with history of alcohol induced peripheral neuropathy in his bilateral lower extremities following up today.  Patient has been titrated up on gabapentin to 600 mg 3 times a day which significantly helped his pain however he has recently run out of medication and has been without it for a period of time which is resulted in an increase in his pain.  He overall is tolerated the gabapentin fairly well with only a little bit of drowsiness during the day that he is able to tolerate.  Otherwise symptoms similar to previous evaluation                Monitoring and compliance:    ORT:    PDUQ:    Office Agreement:      Review of Systems     Current Outpatient Medications   Medication Instructions    cloNIDine (CATAPRES) 0.1 mg, oral, 2 times daily    dextromethorphan-guaifenesin (Robitussin Cough-Chest Billy DM) 5-100 mg/5 mL liquid 5 mL, oral, 2 times daily    efinaconazole (Jublia) 10 % solution with applicator 8 mL, topical (top), Daily    fluticasone (Flonase Sensimist) 27.5 mcg/actuation nasal spray 1 spray, Each Nostril, Daily RT    gabapentin (Neurontin) 300 mg capsule Take 600mg in the morning and afternoon and take 900mg at bedtime.    levothyroxine (SYNTHROID, LEVOXYL) 75 mcg, oral, Daily    loratadine (CLARITIN) 10 mg, oral, Daily    losartan (COZAAR) 100 mg, oral, Daily    metoprolol succinate XL (TOPROL-XL) 100 mg, oral, Daily    omeprazole (PRILOSEC) 40 mg, oral, Daily        Past Medical History:   Diagnosis Date    Acute sinusitis, unspecified 08/29/2013    Acute sinusitis    Body mass index (BMI) 27.0-27.9, adult     BMI 27.0-27.9,adult    Fatty liver     High cholesterol     Hypertension     Personal history of other diseases of the respiratory system 11/26/2013    History of acute bronchitis    Personal history of other specified conditions 08/01/2013    History of abdominal pain        No past  surgical history on file.     Family History   Problem Relation Name Age of Onset    Other (MTHFR mutation) Sister      Hypertension Other FHx         essential    Heart disease Other FHx     Diabetes type II Other FHx         Allergies   Allergen Reactions    Cephalexin Unknown        Objective     Vitals:    01/02/24 1429   BP: (!) 155/97   Pulse: 80   Resp: 18        Physical Exam    GENERAL EXAM  Vital Signs: Vital signs to include heart rate, respiration rate, blood pressure, and temperature were reviewed.  General Appearance:  Awake, alert, healthy appearing, well developed, No acute distress.  Head: Normocephalic without evidence of head injury.  Neck: The appearance of the neck was normal without swelling with a midline trachea.  Eyes: The eyelids and eyebrows exhibited no abnormalities.  Pupils were not pin-point.  Sclera was without icterus.  Lungs: Respiration rhythm and depth was normal.  Respiratory movements were normal without labored breathing.  Cardiovascular: No peripheral edema was present.    Neurological: Patient was oriented to time, place, and person.  Speech was normal.  Balance, gait, and stance were unremarkable.    Psychiatric: Appearance was normal with appropriate dress.  Mood was euthymic and affect was normal.  Skin: Affected regions were without ecchymosis or skin lesions.        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  I think it be reasonable to get him back on his gabapentin and increase the evening dosing.  Our plan for today:  - Gabapentin 600 in the morning 600 in the afternoon and increase to 900 mg at night.  We can further increase the nighttime dose to 1200 mg.  - Once again counseled patient on the importance of smoking cessation.    Patient will follow-up  with me as needed or annually.         Relevant Medications    gabapentin (Neurontin) 300 mg capsule            This note was generated with the aid of dictation software, there may be typos despite my attempts at proofreading.

## 2024-01-19 DIAGNOSIS — K80.63 GALLBLADDER & BILE DUCT STONE, ACUTE CHOLECYSTITIS AND OBSTRUCTION: ICD-10-CM

## 2024-01-19 DIAGNOSIS — E03.9 HYPOTHYROIDISM, UNSPECIFIED TYPE: ICD-10-CM

## 2024-01-19 RX ORDER — LEVOTHYROXINE SODIUM 75 UG/1
75 TABLET ORAL DAILY
Qty: 90 TABLET | Refills: 0 | Status: SHIPPED | OUTPATIENT
Start: 2024-01-19 | End: 2024-05-02 | Stop reason: SDUPTHER

## 2024-01-19 RX ORDER — OMEPRAZOLE 40 MG/1
40 CAPSULE, DELAYED RELEASE ORAL DAILY
Qty: 90 CAPSULE | Refills: 0 | Status: SHIPPED | OUTPATIENT
Start: 2024-01-19 | End: 2024-05-02 | Stop reason: SDUPTHER

## 2024-02-19 ENCOUNTER — LAB (OUTPATIENT)
Dept: LAB | Facility: LAB | Age: 59
End: 2024-02-19
Payer: COMMERCIAL

## 2024-02-19 ENCOUNTER — OFFICE VISIT (OUTPATIENT)
Dept: PRIMARY CARE | Facility: CLINIC | Age: 59
End: 2024-02-19
Payer: COMMERCIAL

## 2024-02-19 VITALS
WEIGHT: 236.4 LBS | SYSTOLIC BLOOD PRESSURE: 148 MMHG | HEIGHT: 74 IN | DIASTOLIC BLOOD PRESSURE: 82 MMHG | BODY MASS INDEX: 30.34 KG/M2

## 2024-02-19 DIAGNOSIS — R06.02 SHORTNESS OF BREATH: Primary | ICD-10-CM

## 2024-02-19 DIAGNOSIS — J45.909 ACUTE ASTHMATIC BRONCHITIS (HHS-HCC): ICD-10-CM

## 2024-02-19 DIAGNOSIS — I10 HYPERTENSION, UNSPECIFIED TYPE: ICD-10-CM

## 2024-02-19 DIAGNOSIS — K21.9 GASTROESOPHAGEAL REFLUX DISEASE WITHOUT ESOPHAGITIS: ICD-10-CM

## 2024-02-19 DIAGNOSIS — Z13.220 LIPID SCREENING: ICD-10-CM

## 2024-02-19 DIAGNOSIS — R05.9 COUGH, UNSPECIFIED TYPE: ICD-10-CM

## 2024-02-19 LAB
ALBUMIN SERPL BCP-MCNC: 4.3 G/DL (ref 3.4–5)
ALP SERPL-CCNC: 91 U/L (ref 33–120)
ALT SERPL W P-5'-P-CCNC: 21 U/L (ref 10–52)
ANION GAP SERPL CALC-SCNC: 9 MMOL/L (ref 10–20)
APPEARANCE UR: CLEAR
AST SERPL W P-5'-P-CCNC: 17 U/L (ref 9–39)
BILIRUB SERPL-MCNC: 0.4 MG/DL (ref 0–1.2)
BILIRUB UR STRIP.AUTO-MCNC: NEGATIVE MG/DL
BUN SERPL-MCNC: 10 MG/DL (ref 6–23)
CALCIUM SERPL-MCNC: 9.9 MG/DL (ref 8.6–10.6)
CHLORIDE SERPL-SCNC: 101 MMOL/L (ref 98–107)
CO2 SERPL-SCNC: 32 MMOL/L (ref 21–32)
COLOR UR: NORMAL
CREAT SERPL-MCNC: 0.79 MG/DL (ref 0.5–1.3)
EGFRCR SERPLBLD CKD-EPI 2021: >90 ML/MIN/1.73M*2
ERYTHROCYTE [DISTWIDTH] IN BLOOD BY AUTOMATED COUNT: 13.3 % (ref 11.5–14.5)
GLUCOSE SERPL-MCNC: 99 MG/DL (ref 74–99)
GLUCOSE UR STRIP.AUTO-MCNC: NORMAL MG/DL
HCT VFR BLD AUTO: 46.2 % (ref 41–52)
HGB BLD-MCNC: 15.6 G/DL (ref 13.5–17.5)
KETONES UR STRIP.AUTO-MCNC: NEGATIVE MG/DL
LEUKOCYTE ESTERASE UR QL STRIP.AUTO: NEGATIVE
MCH RBC QN AUTO: 32.4 PG (ref 26–34)
MCHC RBC AUTO-ENTMCNC: 33.8 G/DL (ref 32–36)
MCV RBC AUTO: 96 FL (ref 80–100)
NITRITE UR QL STRIP.AUTO: NEGATIVE
NRBC BLD-RTO: 0 /100 WBCS (ref 0–0)
PH UR STRIP.AUTO: 5.5 [PH]
PLATELET # BLD AUTO: 296 X10*3/UL (ref 150–450)
POTASSIUM SERPL-SCNC: 4.3 MMOL/L (ref 3.5–5.3)
PROT SERPL-MCNC: 7.5 G/DL (ref 6.4–8.2)
PROT UR STRIP.AUTO-MCNC: NEGATIVE MG/DL
RBC # BLD AUTO: 4.81 X10*6/UL (ref 4.5–5.9)
RBC # UR STRIP.AUTO: NEGATIVE /UL
SODIUM SERPL-SCNC: 138 MMOL/L (ref 136–145)
SP GR UR STRIP.AUTO: 1.02
TSH SERPL-ACNC: 2.11 MIU/L (ref 0.44–3.98)
UROBILINOGEN UR STRIP.AUTO-MCNC: NORMAL MG/DL
WBC # BLD AUTO: 10.8 X10*3/UL (ref 4.4–11.3)

## 2024-02-19 PROCEDURE — 36415 COLL VENOUS BLD VENIPUNCTURE: CPT

## 2024-02-19 PROCEDURE — 80053 COMPREHEN METABOLIC PANEL: CPT

## 2024-02-19 PROCEDURE — 3079F DIAST BP 80-89 MM HG: CPT | Performed by: INTERNAL MEDICINE

## 2024-02-19 PROCEDURE — 81003 URINALYSIS AUTO W/O SCOPE: CPT

## 2024-02-19 PROCEDURE — 99214 OFFICE O/P EST MOD 30 MIN: CPT | Performed by: INTERNAL MEDICINE

## 2024-02-19 PROCEDURE — 3077F SYST BP >= 140 MM HG: CPT | Performed by: INTERNAL MEDICINE

## 2024-02-19 PROCEDURE — 85027 COMPLETE CBC AUTOMATED: CPT

## 2024-02-19 PROCEDURE — 84443 ASSAY THYROID STIM HORMONE: CPT

## 2024-02-19 RX ORDER — ALBUTEROL SULFATE AND BUDESONIDE 90; 80 UG/1; UG/1
2 AEROSOL, METERED RESPIRATORY (INHALATION) EVERY 6 HOURS PRN
Qty: 10.7 G | Refills: 0 | Status: SHIPPED | OUTPATIENT
Start: 2024-02-19 | End: 2024-05-27 | Stop reason: WASHOUT

## 2024-02-19 ASSESSMENT — ENCOUNTER SYMPTOMS
OCCASIONAL FEELINGS OF UNSTEADINESS: 0
DEPRESSION: 0
LOSS OF SENSATION IN FEET: 0

## 2024-02-20 NOTE — PROGRESS NOTES
"Subjective   Patient ID: Connor Farr is a 58 y.o. male who presents for Follow-up (Various conditions) and Multiple medical issues.    This gentleman today came here for multiple medical issues.  Shortness of breath, wheezing, not feeling good.  At times his respiration gets stuck and he coughs and he cannot breathe.  He is concerned with that.  He came for follow-up on various conditions.  Weak, tired, fatigued, exhausted.    I have personally reviewed the patient's Past Medical History, Medications, Allergies, Social History, and Family History in the EMR.    Review of Systems   All other systems reviewed and are negative.    Objective   /82   Ht 1.88 m (6' 2\")   Wt 107 kg (236 lb 6.4 oz)   BMI 30.35 kg/m²     Physical Exam  Vitals reviewed.   Cardiovascular:      Heart sounds: Normal heart sounds, S1 normal and S2 normal. No murmur heard.     No friction rub.   Pulmonary:      Effort: Pulmonary effort is normal.      Breath sounds: Normal air entry. Wheezing (bilateral) present.   Abdominal:      Palpations: There is no hepatomegaly, splenomegaly or mass.   Musculoskeletal:      Right lower leg: No edema.      Left lower leg: No edema.   Lymphadenopathy:      Lower Body: No right inguinal adenopathy. No left inguinal adenopathy.   Neurological:      Cranial Nerves: Cranial nerves 2-12 are intact.      Sensory: No sensory deficit.      Motor: Motor function is intact.      Deep Tendon Reflexes: Reflexes are normal and symmetric.     LAB WORK: Laboratory testing discussed.    Assessment/Plan   Problem List Items Addressed This Visit             ICD-10-CM       Cardiac and Vasculature    Hypertension I10    Relevant Orders    CBC (Completed)    Thyroid Stimulating Hormone (Completed)    Urinalysis with Reflex Microscopic (Completed)       Gastrointestinal and Abdominal    GERD (gastroesophageal reflux disease) K21.9       Pulmonary and Pneumonias    Cough R05.9    Relevant Orders    XR chest 2 views "     Other Visit Diagnoses         Codes    Shortness of breath    -  Primary R06.02    Acute asthmatic bronchitis     J45.909    Relevant Medications    albuterol-budesonide (Airsupra) 90-80 mcg/actuation inhaler    Lipid screening     Z13.220    Relevant Orders    Comprehensive Metabolic Panel (Completed)        1. Shortness of breath, cough, COPD.  I gave him some inhaler.  2. Hypertension, okay.  3. High cholesterol, stable.  4. GERD, on PPI.  5. Cough, congestion, shortness of breath, smoking.  Chest x-ray ordered.  6. Follow-up appointment with me in a couple of weeks after tests.    Scribe Attestation  By signing my name below, I, Jack Michael attest that this documentation has been prepared under the direction and in the presence of Roseanna Esqueda MD.

## 2024-02-29 ENCOUNTER — APPOINTMENT (OUTPATIENT)
Dept: CARDIOLOGY | Facility: CLINIC | Age: 59
End: 2024-02-29
Payer: COMMERCIAL

## 2024-03-08 ENCOUNTER — TELEMEDICINE (OUTPATIENT)
Dept: PRIMARY CARE | Facility: CLINIC | Age: 59
End: 2024-03-08
Payer: COMMERCIAL

## 2024-03-08 DIAGNOSIS — E55.9 VITAMIN D DEFICIENCY: ICD-10-CM

## 2024-03-08 DIAGNOSIS — R53.83 OTHER FATIGUE: ICD-10-CM

## 2024-03-08 DIAGNOSIS — G89.29 OTHER CHRONIC PAIN: ICD-10-CM

## 2024-03-08 DIAGNOSIS — I10 HYPERTENSION, UNSPECIFIED TYPE: ICD-10-CM

## 2024-03-08 DIAGNOSIS — R41.3 MEMORY LOSS: ICD-10-CM

## 2024-03-08 DIAGNOSIS — E78.00 PURE HYPERCHOLESTEROLEMIA: ICD-10-CM

## 2024-03-08 DIAGNOSIS — M62.81 NEUROLOGICAL MUSCLE WEAKNESS: Primary | ICD-10-CM

## 2024-03-08 PROCEDURE — 99213 OFFICE O/P EST LOW 20 MIN: CPT | Performed by: INTERNAL MEDICINE

## 2024-03-08 ASSESSMENT — ENCOUNTER SYMPTOMS
OCCASIONAL FEELINGS OF UNSTEADINESS: 0
LOSS OF SENSATION IN FEET: 0
DEPRESSION: 0

## 2024-03-09 NOTE — PROGRESS NOTES
Subjective   Patient ID: Connor Farr is a 58 y.o. male who presents for Follow-up (on various conditions.).    Connor Farr today came here for multiple medical issues.  He is not feeling good.  Family states that it is increasingly worse, he has difficulty in swallowing at times.  Weak and lethargic.  Yesterday, he could not hold his phone.  Both arms are weak.  He is getting increasingly weak.  He used to see Dr. Hernadez in the past.  He cannot have an MRI because of foreign body in the eye.   He came here for follow-up on various conditions.    I have personally reviewed the patient's Past Medical History, Medications, Allergies, Social History, and Family History in the EMR.    Review of Systems   All other systems reviewed and are negative.    Objective   Virtual exam.  There were no vitals taken for this visit.    Physical Exam  Constitutional:       Comments: On camera, the patient looks very dehydrated.     LAB WORK:  Laboratory testing discussed.    Assessment/Plan   Problem List Items Addressed This Visit             ICD-10-CM       Cardiac and Vasculature    Hypertension I10    Relevant Orders    CBC    Ammonia     Other Visit Diagnoses         Codes    Neurological muscle weakness    -  Primary M62.81    Memory loss     R41.3    Relevant Orders    Referral to Neurology    CT brain attack angio head and neck W and WO IV contrast    Pure hypercholesterolemia     E78.00    Relevant Orders    Comprehensive Metabolic Panel    Lipid Panel    Vitamin D deficiency     E55.9    Relevant Orders    Vitamin D 25-Hydroxy,Total (for eval of Vitamin D levels)    Other fatigue     R53.83    Relevant Orders    Vitamin B12    Other chronic pain     G89.29    Relevant Orders    Arthritis Panel (CMS)        1. New neurological weakness progressively worse.  Differential diagnosis include Guillain-Barré syndrome, but not limited to ALS.  I believe the patient really should go to [facility] for check-up, but  family does not want to.  Meanwhile, I ordered brain MRI.  Referral blood work ordered.  2. I shall see him right away, but I urged family to go to [facility].  I will keep an eye.  3. I shall see him in a couple of days after testing.    Scribe Attestation  By signing my name below, I, Jack Stuart attest that this documentation has been prepared under the direction and in the presence of Roseanna Esqueda MD.

## 2024-03-22 ENCOUNTER — APPOINTMENT (OUTPATIENT)
Dept: PRIMARY CARE | Facility: CLINIC | Age: 59
End: 2024-03-22
Payer: COMMERCIAL

## 2024-03-26 ENCOUNTER — HOSPITAL ENCOUNTER (OUTPATIENT)
Dept: RADIOLOGY | Facility: CLINIC | Age: 59
End: 2024-03-26
Payer: COMMERCIAL

## 2024-03-26 ENCOUNTER — HOSPITAL ENCOUNTER (OUTPATIENT)
Dept: RADIOLOGY | Facility: CLINIC | Age: 59
Discharge: HOME | End: 2024-03-26
Payer: COMMERCIAL

## 2024-03-26 DIAGNOSIS — R41.3 MEMORY LOSS: ICD-10-CM

## 2024-03-28 ENCOUNTER — LAB (OUTPATIENT)
Dept: LAB | Facility: LAB | Age: 59
End: 2024-03-28
Payer: COMMERCIAL

## 2024-03-28 ENCOUNTER — OFFICE VISIT (OUTPATIENT)
Dept: PRIMARY CARE | Facility: CLINIC | Age: 59
End: 2024-03-28
Payer: COMMERCIAL

## 2024-03-28 VITALS
BODY MASS INDEX: 30.29 KG/M2 | DIASTOLIC BLOOD PRESSURE: 84 MMHG | SYSTOLIC BLOOD PRESSURE: 142 MMHG | HEIGHT: 74 IN | WEIGHT: 236 LBS

## 2024-03-28 DIAGNOSIS — E78.00 PURE HYPERCHOLESTEROLEMIA: ICD-10-CM

## 2024-03-28 DIAGNOSIS — R73.03 PRE-DIABETES: ICD-10-CM

## 2024-03-28 DIAGNOSIS — I10 HYPERTENSION, UNSPECIFIED TYPE: Primary | ICD-10-CM

## 2024-03-28 DIAGNOSIS — R25.2 LEG CRAMPS: ICD-10-CM

## 2024-03-28 DIAGNOSIS — I73.9 CLAUDICATION (CMS-HCC): ICD-10-CM

## 2024-03-28 DIAGNOSIS — E55.9 VITAMIN D DEFICIENCY: ICD-10-CM

## 2024-03-28 DIAGNOSIS — K21.9 GASTROESOPHAGEAL REFLUX DISEASE WITHOUT ESOPHAGITIS: ICD-10-CM

## 2024-03-28 DIAGNOSIS — G89.29 OTHER CHRONIC PAIN: ICD-10-CM

## 2024-03-28 DIAGNOSIS — R53.83 OTHER FATIGUE: ICD-10-CM

## 2024-03-28 DIAGNOSIS — I10 HYPERTENSION, UNSPECIFIED TYPE: ICD-10-CM

## 2024-03-28 PROCEDURE — 99214 OFFICE O/P EST MOD 30 MIN: CPT | Performed by: INTERNAL MEDICINE

## 2024-03-28 PROCEDURE — 83036 HEMOGLOBIN GLYCOSYLATED A1C: CPT

## 2024-03-28 PROCEDURE — 84550 ASSAY OF BLOOD/URIC ACID: CPT

## 2024-03-28 PROCEDURE — 3079F DIAST BP 80-89 MM HG: CPT | Performed by: INTERNAL MEDICINE

## 2024-03-28 PROCEDURE — 82607 VITAMIN B-12: CPT

## 2024-03-28 PROCEDURE — 86431 RHEUMATOID FACTOR QUANT: CPT

## 2024-03-28 PROCEDURE — 86038 ANTINUCLEAR ANTIBODIES: CPT

## 2024-03-28 PROCEDURE — 3077F SYST BP >= 140 MM HG: CPT | Performed by: INTERNAL MEDICINE

## 2024-03-28 PROCEDURE — 36415 COLL VENOUS BLD VENIPUNCTURE: CPT

## 2024-03-28 PROCEDURE — 85652 RBC SED RATE AUTOMATED: CPT

## 2024-03-28 PROCEDURE — 82140 ASSAY OF AMMONIA: CPT

## 2024-03-28 PROCEDURE — 80053 COMPREHEN METABOLIC PANEL: CPT

## 2024-03-28 PROCEDURE — 82306 VITAMIN D 25 HYDROXY: CPT

## 2024-03-28 PROCEDURE — 85027 COMPLETE CBC AUTOMATED: CPT

## 2024-03-28 PROCEDURE — 80061 LIPID PANEL: CPT

## 2024-03-28 RX ORDER — CALCIUM CARBONATE 300MG(750)
400 TABLET,CHEWABLE ORAL DAILY
Qty: 30 TABLET | Refills: 0 | Status: SHIPPED | OUTPATIENT
Start: 2024-03-28 | End: 2024-05-27 | Stop reason: WASHOUT

## 2024-03-28 RX ORDER — ROPINIROLE 0.5 MG/1
0.5 TABLET, FILM COATED ORAL 2 TIMES DAILY
Qty: 60 TABLET | Refills: 0 | Status: SHIPPED | OUTPATIENT
Start: 2024-03-28 | End: 2024-05-27 | Stop reason: WASHOUT

## 2024-03-28 ASSESSMENT — ENCOUNTER SYMPTOMS
DEPRESSION: 0
LOSS OF SENSATION IN FEET: 0
OCCASIONAL FEELINGS OF UNSTEADINESS: 0

## 2024-03-29 LAB
25(OH)D3 SERPL-MCNC: 8 NG/ML (ref 30–100)
ALBUMIN SERPL BCP-MCNC: 4.4 G/DL (ref 3.4–5)
ALP SERPL-CCNC: 79 U/L (ref 33–120)
ALT SERPL W P-5'-P-CCNC: 32 U/L (ref 10–52)
AMMONIA PLAS-SCNC: 39 UMOL/L (ref 16–53)
ANA SER QL HEP2 SUBST: NEGATIVE
ANION GAP SERPL CALC-SCNC: 15 MMOL/L (ref 10–20)
AST SERPL W P-5'-P-CCNC: 22 U/L (ref 9–39)
BILIRUB SERPL-MCNC: 0.5 MG/DL (ref 0–1.2)
BUN SERPL-MCNC: 14 MG/DL (ref 6–23)
CALCIUM SERPL-MCNC: 10.3 MG/DL (ref 8.6–10.6)
CHLORIDE SERPL-SCNC: 101 MMOL/L (ref 98–107)
CHOLEST SERPL-MCNC: 255 MG/DL (ref 0–199)
CHOLESTEROL/HDL RATIO: 6.9
CO2 SERPL-SCNC: 29 MMOL/L (ref 21–32)
CREAT SERPL-MCNC: 0.82 MG/DL (ref 0.5–1.3)
EGFRCR SERPLBLD CKD-EPI 2021: >90 ML/MIN/1.73M*2
ERYTHROCYTE [DISTWIDTH] IN BLOOD BY AUTOMATED COUNT: 13.2 % (ref 11.5–14.5)
ERYTHROCYTE [SEDIMENTATION RATE] IN BLOOD BY WESTERGREN METHOD: 9 MM/H (ref 0–20)
EST. AVERAGE GLUCOSE BLD GHB EST-MCNC: 140 MG/DL
GLUCOSE SERPL-MCNC: 113 MG/DL (ref 74–99)
HBA1C MFR BLD: 6.5 %
HCT VFR BLD AUTO: 46.3 % (ref 41–52)
HDLC SERPL-MCNC: 36.8 MG/DL
HGB BLD-MCNC: 15.7 G/DL (ref 13.5–17.5)
LDLC SERPL CALC-MCNC: 159 MG/DL
MCH RBC QN AUTO: 32.6 PG (ref 26–34)
MCHC RBC AUTO-ENTMCNC: 33.9 G/DL (ref 32–36)
MCV RBC AUTO: 96 FL (ref 80–100)
NON HDL CHOLESTEROL: 218 MG/DL (ref 0–149)
NRBC BLD-RTO: 0 /100 WBCS (ref 0–0)
PLATELET # BLD AUTO: 283 X10*3/UL (ref 150–450)
POTASSIUM SERPL-SCNC: 4.7 MMOL/L (ref 3.5–5.3)
PROT SERPL-MCNC: 7.7 G/DL (ref 6.4–8.2)
RBC # BLD AUTO: 4.81 X10*6/UL (ref 4.5–5.9)
RHEUMATOID FACT SER NEPH-ACNC: <10 IU/ML (ref 0–15)
SODIUM SERPL-SCNC: 140 MMOL/L (ref 136–145)
TRIGL SERPL-MCNC: 296 MG/DL (ref 0–149)
URATE SERPL-MCNC: 6.9 MG/DL (ref 4–7.5)
VIT B12 SERPL-MCNC: 362 PG/ML (ref 211–911)
VLDL: 59 MG/DL (ref 0–40)
WBC # BLD AUTO: 10.6 X10*3/UL (ref 4.4–11.3)

## 2024-03-29 NOTE — PROGRESS NOTES
"Subjective   Patient ID: Connor Farr is a 58 y.o. male who presents for Follow-up.    1. This gentleman is very unhappy.  He is getting leg cramps.  They get worse when he walks.  When he sleeps at night, he is slightly better, but continuous pain and cramping, not better.  2. Follow-up on other conditions.  He is waiting for blood work.  3. There is question of diabetes.    I have personally reviewed the patient's Past Medical History, Medications, Allergies, Social History, and Family History in the EMR.    Review of Systems   All other systems reviewed and are negative.    Objective   /84   Ht 1.88 m (6' 2\")   Wt 107 kg (236 lb)   BMI 30.30 kg/m²     Physical Exam  Vitals reviewed.   Cardiovascular:      Heart sounds: Normal heart sounds, S1 normal and S2 normal. No murmur heard.     No friction rub.   Pulmonary:      Effort: Pulmonary effort is normal.      Breath sounds: Normal breath sounds and air entry.   Abdominal:      Palpations: There is no hepatomegaly, splenomegaly or mass.   Musculoskeletal:      Right lower leg: No edema.      Left lower leg: No edema.      Comments: BACK: Diffuse tenderness.  Movements painful.  Straight leg limited.  ______.   Lymphadenopathy:      Lower Body: No right inguinal adenopathy. No left inguinal adenopathy.   Neurological:      Cranial Nerves: Cranial nerves 2-12 are intact.      Sensory: No sensory deficit.      Motor: Motor function is intact.      Deep Tendon Reflexes: Reflexes are normal and symmetric.     LAB WORK: Laboratory testing discussed.    Assessment/Plan   Problem List Items Addressed This Visit             ICD-10-CM       Cardiac and Vasculature    Claudication (CMS/HCC) I73.9    Relevant Orders    Vascular US PVR with exercise    Hypertension - Primary I10       Gastrointestinal and Abdominal    GERD (gastroesophageal reflux disease) K21.9     Other Visit Diagnoses         Codes    Leg cramps     R25.2    Relevant Medications    magnesium oxide " (Mag-Ox) 400 mg tablet    rOPINIRole (Requip) 0.5 mg tablet    Pre-diabetes     R73.03    Relevant Orders    Hemoglobin A1C (Completed)    Pure hypercholesterolemia     E78.00        1. Leg cramps, claudication.  I ordered PVR.  We will try some magnesium and Requip.  We ordered all the relevant blood tests.  2. Possible type 2 diabetes.  Hemoglobin A1c ordered.  3. Hypertension, okay.  4. Cholesterol, stable.  5. GERD, on PPI.  6. He cannot do MRI because of metal.  7. Follow-up appointment with me in a week after the testing.  8. Welcome back.    Scribe Attestation  By signing my name below, I, Jack Michael attest that this documentation has been prepared under the direction and in the presence of Roseanna Esqueda MD.

## 2024-05-02 DIAGNOSIS — E03.9 HYPOTHYROIDISM, UNSPECIFIED TYPE: ICD-10-CM

## 2024-05-02 DIAGNOSIS — E78.00 PURE HYPERCHOLESTEROLEMIA: ICD-10-CM

## 2024-05-02 DIAGNOSIS — I10 PRIMARY HYPERTENSION: ICD-10-CM

## 2024-05-02 DIAGNOSIS — J45.909 ACUTE ASTHMATIC BRONCHITIS (HHS-HCC): ICD-10-CM

## 2024-05-02 DIAGNOSIS — K80.63 GALLBLADDER & BILE DUCT STONE, ACUTE CHOLECYSTITIS AND OBSTRUCTION: ICD-10-CM

## 2024-05-02 RX ORDER — LOSARTAN POTASSIUM 100 MG/1
100 TABLET ORAL DAILY
Qty: 30 TABLET | Refills: 5 | Status: SHIPPED | OUTPATIENT
Start: 2024-05-02 | End: 2024-10-29

## 2024-05-02 RX ORDER — CLONIDINE HYDROCHLORIDE 0.1 MG/1
0.1 TABLET ORAL 2 TIMES DAILY
Qty: 180 TABLET | Refills: 0 | Status: SHIPPED | OUTPATIENT
Start: 2024-05-02 | End: 2024-05-27 | Stop reason: WASHOUT

## 2024-05-02 RX ORDER — OMEPRAZOLE 40 MG/1
40 CAPSULE, DELAYED RELEASE ORAL DAILY
Qty: 90 CAPSULE | Refills: 0 | Status: SHIPPED | OUTPATIENT
Start: 2024-05-02

## 2024-05-02 RX ORDER — LEVOTHYROXINE SODIUM 75 UG/1
75 TABLET ORAL DAILY
Qty: 90 TABLET | Refills: 0 | Status: SHIPPED | OUTPATIENT
Start: 2024-05-02 | End: 2024-07-31

## 2024-05-02 RX ORDER — LORATADINE 10 MG/1
10 TABLET ORAL DAILY
Qty: 90 TABLET | Refills: 0 | Status: SHIPPED | OUTPATIENT
Start: 2024-05-02 | End: 2024-05-27 | Stop reason: WASHOUT

## 2024-05-09 ENCOUNTER — LAB (OUTPATIENT)
Dept: LAB | Facility: LAB | Age: 59
End: 2024-05-09
Payer: COMMERCIAL

## 2024-05-09 DIAGNOSIS — E78.00 PURE HYPERCHOLESTEROLEMIA: ICD-10-CM

## 2024-05-09 LAB
ALBUMIN SERPL BCP-MCNC: 4.3 G/DL (ref 3.4–5)
ALP SERPL-CCNC: 82 U/L (ref 33–120)
ALT SERPL W P-5'-P-CCNC: 22 U/L (ref 10–52)
ANION GAP SERPL CALC-SCNC: 15 MMOL/L (ref 10–20)
AST SERPL W P-5'-P-CCNC: 19 U/L (ref 9–39)
BILIRUB SERPL-MCNC: 0.3 MG/DL (ref 0–1.2)
BUN SERPL-MCNC: 10 MG/DL (ref 6–23)
CALCIUM SERPL-MCNC: 9.4 MG/DL (ref 8.6–10.6)
CHLORIDE SERPL-SCNC: 98 MMOL/L (ref 98–107)
CO2 SERPL-SCNC: 29 MMOL/L (ref 21–32)
CREAT SERPL-MCNC: 0.88 MG/DL (ref 0.5–1.3)
EGFRCR SERPLBLD CKD-EPI 2021: >90 ML/MIN/1.73M*2
GLUCOSE SERPL-MCNC: 104 MG/DL (ref 74–99)
POTASSIUM SERPL-SCNC: 4.5 MMOL/L (ref 3.5–5.3)
PROT SERPL-MCNC: 7.2 G/DL (ref 6.4–8.2)
SODIUM SERPL-SCNC: 137 MMOL/L (ref 136–145)

## 2024-05-09 PROCEDURE — 36415 COLL VENOUS BLD VENIPUNCTURE: CPT

## 2024-05-09 PROCEDURE — 80053 COMPREHEN METABOLIC PANEL: CPT

## 2024-05-14 ENCOUNTER — HOSPITAL ENCOUNTER (OUTPATIENT)
Dept: RADIOLOGY | Facility: CLINIC | Age: 59
Discharge: HOME | End: 2024-05-14
Payer: COMMERCIAL

## 2024-05-14 PROCEDURE — 2550000001 HC RX 255 CONTRASTS: Performed by: INTERNAL MEDICINE

## 2024-05-14 PROCEDURE — 70470 CT HEAD/BRAIN W/O & W/DYE: CPT

## 2024-05-14 RX ADMIN — IOHEXOL 50 ML: 350 INJECTION, SOLUTION INTRAVENOUS at 14:33

## 2024-05-21 ENCOUNTER — APPOINTMENT (OUTPATIENT)
Dept: PRIMARY CARE | Facility: CLINIC | Age: 59
End: 2024-05-21
Payer: COMMERCIAL

## 2024-05-21 ENCOUNTER — OFFICE VISIT (OUTPATIENT)
Dept: PRIMARY CARE | Facility: CLINIC | Age: 59
End: 2024-05-21
Payer: COMMERCIAL

## 2024-05-21 VITALS
RESPIRATION RATE: 20 BRPM | HEIGHT: 72 IN | DIASTOLIC BLOOD PRESSURE: 74 MMHG | BODY MASS INDEX: 31.15 KG/M2 | WEIGHT: 230 LBS | TEMPERATURE: 98.7 F | SYSTOLIC BLOOD PRESSURE: 138 MMHG | OXYGEN SATURATION: 97 % | HEART RATE: 68 BPM

## 2024-05-21 DIAGNOSIS — Z12.2 ENCOUNTER FOR SCREENING FOR MALIGNANT NEOPLASM OF LUNG IN CURRENT SMOKER WITH 30 PACK YEAR HISTORY OR GREATER: Primary | ICD-10-CM

## 2024-05-21 DIAGNOSIS — F17.200 ENCOUNTER FOR SCREENING FOR MALIGNANT NEOPLASM OF LUNG IN CURRENT SMOKER WITH 30 PACK YEAR HISTORY OR GREATER: Primary | ICD-10-CM

## 2024-05-21 DIAGNOSIS — G62.9 PERIPHERAL POLYNEUROPATHY: ICD-10-CM

## 2024-05-21 DIAGNOSIS — I10 PRIMARY HYPERTENSION: ICD-10-CM

## 2024-05-21 PROCEDURE — 99204 OFFICE O/P NEW MOD 45 MIN: CPT | Performed by: FAMILY MEDICINE

## 2024-05-21 PROCEDURE — 3075F SYST BP GE 130 - 139MM HG: CPT | Performed by: FAMILY MEDICINE

## 2024-05-21 PROCEDURE — 3078F DIAST BP <80 MM HG: CPT | Performed by: FAMILY MEDICINE

## 2024-05-21 ASSESSMENT — PAIN SCALES - GENERAL: PAINLEVEL: 0-NO PAIN

## 2024-05-21 ASSESSMENT — ENCOUNTER SYMPTOMS
OCCASIONAL FEELINGS OF UNSTEADINESS: 0
LOSS OF SENSATION IN FEET: 0
DEPRESSION: 0

## 2024-05-21 NOTE — PROGRESS NOTES
Subjective   Patient ID: Connor Farr is a 58 y.o. male who presents for New Patient Visit (NW PATIENT, COMPLAINS OF PAIN IN BOTH LEGS FOR 7 YEARS . HE HAS HAD 2 EMGS IN THE PAST , NO SIGN ON NEUROPATHY).    HPI He has had ongoing numbness /paresthesias of bilateral lower legs that has ascended somewhat over time.  He had remote eval but no definitive dx although was told from etoh at one point and did drink moderately heavy for a log time but did quit about a year  ago.   He has upcoming neuro eval already scheduled and also cardiac exam with Dr Ellis.     Review of Systems see HPI    Objective   /74 (BP Location: Right arm, Patient Position: Sitting, BP Cuff Size: Adult)   Pulse 68   Temp 37.1 °C (98.7 °F) (Skin)   Resp 20   Ht 1.829 m (6')   Wt 104 kg (230 lb)   SpO2 97%   BMI 31.19 kg/m²     Physical Exam  Constitutional:       General: He is not in acute distress.     Appearance: Normal appearance.   Cardiovascular:      Rate and Rhythm: Normal rate and regular rhythm.      Heart sounds: No murmur heard.  Pulmonary:      Breath sounds: Normal breath sounds. No wheezing.   Neurological:      Mental Status: He is alert.      Comments: Decreased sensation bilateral lower extremities.           Assessment/Plan   Problem List Items Addressed This Visit             ICD-10-CM    Hypertension I10    Peripheral polyneuropathy G62.9     Other Visit Diagnoses         Codes    Encounter for screening for malignant neoplasm of lung in current smoker with 30 pack year history or greater    -  Primary Z12.2, F17.200    Relevant Orders    CT lung screening low dose          Continue current meds and follow up near future with neuro for eval.  May be etoh induced vs other.

## 2024-05-29 ENCOUNTER — OFFICE VISIT (OUTPATIENT)
Dept: CARDIOLOGY | Facility: CLINIC | Age: 59
End: 2024-05-29
Payer: COMMERCIAL

## 2024-05-29 DIAGNOSIS — M54.9 BACK PAIN, UNSPECIFIED BACK LOCATION, UNSPECIFIED BACK PAIN LATERALITY, UNSPECIFIED CHRONICITY: ICD-10-CM

## 2024-05-29 DIAGNOSIS — I73.9 PERIPHERAL ARTERY DISEASE (CMS-HCC): ICD-10-CM

## 2024-05-29 DIAGNOSIS — I73.9 CLAUDICATION (CMS-HCC): ICD-10-CM

## 2024-05-29 PROCEDURE — 3079F DIAST BP 80-89 MM HG: CPT | Performed by: INTERNAL MEDICINE

## 2024-05-29 PROCEDURE — 99214 OFFICE O/P EST MOD 30 MIN: CPT | Performed by: INTERNAL MEDICINE

## 2024-05-29 PROCEDURE — 3077F SYST BP >= 140 MM HG: CPT | Performed by: INTERNAL MEDICINE

## 2024-05-29 ASSESSMENT — PAIN SCALES - GENERAL: PAINLEVEL: 0-NO PAIN

## 2024-05-31 RX ORDER — ROSUVASTATIN CALCIUM 20 MG/1
20 TABLET, COATED ORAL DAILY
Qty: 90 TABLET | Refills: 3 | Status: SHIPPED | OUTPATIENT
Start: 2024-05-31 | End: 2025-05-31

## 2024-06-03 VITALS
DIASTOLIC BLOOD PRESSURE: 88 MMHG | BODY MASS INDEX: 31.46 KG/M2 | SYSTOLIC BLOOD PRESSURE: 140 MMHG | HEIGHT: 72 IN | WEIGHT: 232.3 LBS | OXYGEN SATURATION: 97 % | HEART RATE: 84 BPM

## 2024-06-06 ENCOUNTER — LAB (OUTPATIENT)
Dept: LAB | Facility: LAB | Age: 59
End: 2024-06-06
Payer: COMMERCIAL

## 2024-06-06 ENCOUNTER — OFFICE VISIT (OUTPATIENT)
Dept: PRIMARY CARE | Facility: CLINIC | Age: 59
End: 2024-06-06
Payer: COMMERCIAL

## 2024-06-06 VITALS
BODY MASS INDEX: 31.56 KG/M2 | SYSTOLIC BLOOD PRESSURE: 138 MMHG | DIASTOLIC BLOOD PRESSURE: 82 MMHG | HEIGHT: 72 IN | WEIGHT: 233 LBS

## 2024-06-06 DIAGNOSIS — Z13.220 LIPID SCREENING: ICD-10-CM

## 2024-06-06 DIAGNOSIS — E13.9 OTHER SPECIFIED DIABETES MELLITUS WITHOUT COMPLICATION, WITHOUT LONG-TERM CURRENT USE OF INSULIN (MULTI): ICD-10-CM

## 2024-06-06 DIAGNOSIS — M54.10 BACK PAIN WITH RADICULOPATHY: ICD-10-CM

## 2024-06-06 DIAGNOSIS — S05.42XA: ICD-10-CM

## 2024-06-06 DIAGNOSIS — R25.2 LEG CRAMPS: ICD-10-CM

## 2024-06-06 DIAGNOSIS — E11.40 TYPE 2 DIABETES MELLITUS WITH DIABETIC NEUROPATHY, UNSPECIFIED WHETHER LONG TERM INSULIN USE (MULTI): Primary | ICD-10-CM

## 2024-06-06 DIAGNOSIS — E55.9 VITAMIN D DEFICIENCY: ICD-10-CM

## 2024-06-06 LAB
ALBUMIN SERPL BCP-MCNC: 4.5 G/DL (ref 3.4–5)
ALP SERPL-CCNC: 89 U/L (ref 33–120)
ALT SERPL W P-5'-P-CCNC: 24 U/L (ref 10–52)
ANION GAP SERPL CALC-SCNC: 17 MMOL/L (ref 10–20)
AST SERPL W P-5'-P-CCNC: 19 U/L (ref 9–39)
BILIRUB SERPL-MCNC: 0.4 MG/DL (ref 0–1.2)
BUN SERPL-MCNC: 11 MG/DL (ref 6–23)
CALCIUM SERPL-MCNC: 9.9 MG/DL (ref 8.6–10.6)
CHLORIDE SERPL-SCNC: 99 MMOL/L (ref 98–107)
CO2 SERPL-SCNC: 29 MMOL/L (ref 21–32)
CREAT SERPL-MCNC: 0.94 MG/DL (ref 0.5–1.3)
EGFRCR SERPLBLD CKD-EPI 2021: >90 ML/MIN/1.73M*2
EST. AVERAGE GLUCOSE BLD GHB EST-MCNC: 143 MG/DL
GLUCOSE SERPL-MCNC: 100 MG/DL (ref 74–99)
HBA1C MFR BLD: 6.6 %
POTASSIUM SERPL-SCNC: 4.6 MMOL/L (ref 3.5–5.3)
PROT SERPL-MCNC: 7.5 G/DL (ref 6.4–8.2)
SODIUM SERPL-SCNC: 140 MMOL/L (ref 136–145)

## 2024-06-06 PROCEDURE — 4010F ACE/ARB THERAPY RXD/TAKEN: CPT | Performed by: INTERNAL MEDICINE

## 2024-06-06 PROCEDURE — 80053 COMPREHEN METABOLIC PANEL: CPT

## 2024-06-06 PROCEDURE — 83036 HEMOGLOBIN GLYCOSYLATED A1C: CPT

## 2024-06-06 PROCEDURE — 99214 OFFICE O/P EST MOD 30 MIN: CPT | Performed by: INTERNAL MEDICINE

## 2024-06-06 PROCEDURE — 36415 COLL VENOUS BLD VENIPUNCTURE: CPT

## 2024-06-06 PROCEDURE — 3079F DIAST BP 80-89 MM HG: CPT | Performed by: INTERNAL MEDICINE

## 2024-06-06 PROCEDURE — 3050F LDL-C >= 130 MG/DL: CPT | Performed by: INTERNAL MEDICINE

## 2024-06-06 PROCEDURE — 3075F SYST BP GE 130 - 139MM HG: CPT | Performed by: INTERNAL MEDICINE

## 2024-06-06 PROCEDURE — 3044F HG A1C LEVEL LT 7.0%: CPT | Performed by: INTERNAL MEDICINE

## 2024-06-06 RX ORDER — METFORMIN HYDROCHLORIDE 500 MG/1
500 TABLET ORAL
Qty: 200 TABLET | Refills: 3 | Status: SHIPPED | OUTPATIENT
Start: 2024-06-06 | End: 2025-07-11

## 2024-06-06 RX ORDER — LANCETS
EACH MISCELLANEOUS
Qty: 200 EACH | Refills: 3 | Status: SHIPPED | OUTPATIENT
Start: 2024-06-06

## 2024-06-06 RX ORDER — BLOOD SUGAR DIAGNOSTIC
STRIP MISCELLANEOUS
Qty: 200 EACH | Refills: 3 | Status: SHIPPED | OUTPATIENT
Start: 2024-06-06

## 2024-06-06 RX ORDER — INSULIN PUMP SYRINGE, 3 ML
EACH MISCELLANEOUS
Qty: 1 EACH | Refills: 0 | Status: SHIPPED | OUTPATIENT
Start: 2024-06-06 | End: 2025-06-06

## 2024-06-06 RX ORDER — ACETAMINOPHEN 500 MG
5000 TABLET ORAL DAILY
Qty: 90 TABLET | Refills: 0 | Status: SHIPPED | OUTPATIENT
Start: 2024-06-06

## 2024-06-06 ASSESSMENT — ENCOUNTER SYMPTOMS
DEPRESSION: 0
OCCASIONAL FEELINGS OF UNSTEADINESS: 0
LOSS OF SENSATION IN FEET: 0

## 2024-06-07 NOTE — PROGRESS NOTES
Subjective   Patient ID: Connor Farr is a 58 y.o. male who presents for multiple medical issues..    Connor Farr came here for multiple medical issues.  1. Severe back pain going in the legs, not getting better.  Dr. Ellis does not think it is a circulation issue.  2. Follow-up on other conditions. We could not get yet answer whether he could do MRI because of foreign body in the left eye, which happened because of industrial accident ______.  He came here for follow-up on various conditions.  His last x-ray of the orbits was done years ago.    I have personally reviewed the patient's Past Medical History, Medications, Allergies, Social History, and Family History in the EMR.    Review of Systems   All other systems reviewed and are negative.    Objective   /82   Ht 1.829 m (6')   Wt 106 kg (233 lb)   BMI 31.60 kg/m²     Physical Exam  Vitals reviewed.   Cardiovascular:      Heart sounds: Normal heart sounds, S1 normal and S2 normal. No murmur heard.     No friction rub.   Pulmonary:      Effort: Pulmonary effort is normal.      Breath sounds: Normal breath sounds and air entry.   Abdominal:      Palpations: There is no hepatomegaly, splenomegaly or mass.   Musculoskeletal:      Right lower leg: No edema.      Left lower leg: No edema.      Comments: BACK:  Movements painful.  Straight leg raise limited.   Lymphadenopathy:      Lower Body: No right inguinal adenopathy. No left inguinal adenopathy.   Neurological:      Cranial Nerves: Cranial nerves 2-12 are intact.      Sensory: No sensory deficit.      Motor: Motor function is intact.      Deep Tendon Reflexes: Reflexes are normal and symmetric.     LAB WORK:  Laboratory testing discussed.    Assessment/Plan   Problem List Items Addressed This Visit    None  Visit Diagnoses         Codes    Type 2 diabetes mellitus with diabetic neuropathy, unspecified whether long term insulin use (Multi)    -  Primary E11.40    Vitamin D deficiency     E55.9     Relevant Medications    cholecalciferol (Vitamin D3) 5,000 Units tablet    Lipid screening     Z13.220    Relevant Orders    Comprehensive Metabolic Panel (Completed)    Other specified diabetes mellitus without complication, without long-term current use of insulin (Multi)     E13.9    Relevant Medications    Blood glucose monitoring meter kit kit    lancets misc    FreeStyle Test strip    metFORMIN (Glucophage) 500 mg tablet    Other Relevant Orders    Hemoglobin A1C (Completed)    Foreign body of left orbit, initial encounter     S05.42XA    Relevant Orders    XR orbits complete 4+ views    Back pain with radiculopathy     M54.10    Leg cramps     R25.2        1. Diabetic neuropathy.  He tried gabapentin.  It did not work.  2. Type 2 diabetes.  Probably diagnosis confirmed.  I am going to do blood work, give him strips, lancet.  We will start some metformin.  3. Foreign body in the eye.  I ordered x-ray.  4. Back pain with radiculopathy and leg cramps.  I think the patient has a lumbar canal stenosis.  His last CT scan of the spine was in 2019.  It did not show much.  He needs an MRI.  We will order.  I think no circulation problem.  5. Low vitamin D.  Given vitamin D.  6. I shall see him back in a couple of weeks after above testing.    Scribe Attestation  By signing my name below, I, Jack Stuart attest that this documentation has been prepared under the direction and in the presence of Roseanna Esqueda MD.

## 2024-06-10 NOTE — PROGRESS NOTES
Primary Care Physician: Roseanna Esqueda MD  Date of Visit: 05/29/2024  3:30 PM EDT  Location of visit: [facility]     Chief Complaint:   Chief Complaint   Patient presents with    Follow-up    Hypertension     HPI / Summary:   Connor Farr is a 58 y.o. male presents for follow-up    ROS    Medical History:   He has a past medical history of Acute sinusitis, unspecified (08/29/2013), Body mass index (BMI) 27.0-27.9, adult, Fatty liver, High cholesterol, Hypertension, Personal history of other diseases of the respiratory system (11/26/2013), and Personal history of other specified conditions (08/01/2013).  Surgical Hx:   He has no past surgical history on file.   Social Hx:   He reports that he has quit smoking. His smoking use included cigarettes. He has never used smokeless tobacco. He reports that he does not drink alcohol and does not use drugs.  Family Hx:   His family history includes Diabetes type II in an other family member; Heart disease in an other family member; Hypertension in an other family member; MTHFR mutation in his sister.   Allergies:  Allergies   Allergen Reactions    Cephalexin Unknown     Outpatient Medications:  Current Outpatient Medications   Medication Instructions    Blood glucose monitoring meter kit kit Test twice daily    cholecalciferol (VITAMIN D3) 5,000 Units, oral, Daily    FreeStyle Test strip Test twice daily    lancets misc Test twice daily    levothyroxine (SYNTHROID, LEVOXYL) 75 mcg, oral, Daily    losartan (COZAAR) 100 mg, oral, Daily    metFORMIN (GLUCOPHAGE) 500 mg, oral, 2 times daily (morning and late afternoon)    metoprolol succinate XL (TOPROL-XL) 100 mg, oral, Daily    omeprazole (PRILOSEC) 40 mg, oral, Daily    rosuvastatin (CRESTOR) 20 mg, oral, Daily     Physical Exam:  Vitals:    05/29/24 1535 05/29/24 1537   BP: (!) 144/92 140/88   BP Location: Right arm    Patient Position: Sitting    BP Cuff Size: Adult    Pulse: 82 84   SpO2: 97%    Weight: 105  kg (232 lb 4.8 oz)    Height: 1.829 m (6')      Wt Readings from Last 5 Encounters:   06/06/24 106 kg (233 lb)   05/29/24 105 kg (232 lb 4.8 oz)   05/21/24 104 kg (230 lb)   03/28/24 107 kg (236 lb)   02/19/24 107 kg (236 lb 6.4 oz)     Physical Exam  JVP not elevated. Carotid impulses are 2+ without overlying bruit.   Chest exhibits fair to good air movement with completely clear breath sounds.   The cardiac rhythm is regular with no premature beats.   Normal S1 and S2. No gallop, murmur or rub, or click.   Abdomen is soft and benign without focal tenderness.   With no lower leg edema. The pedal pulses are intact.     Last Labs:  Lab on 05/09/2024   Component Date Value    Glucose 05/09/2024 104 (H)     Sodium 05/09/2024 137     Potassium 05/09/2024 4.5     Chloride 05/09/2024 98     Bicarbonate 05/09/2024 29     Anion Gap 05/09/2024 15     Urea Nitrogen 05/09/2024 10     Creatinine 05/09/2024 0.88     eGFR 05/09/2024 >90     Calcium 05/09/2024 9.4     Albumin 05/09/2024 4.3     Alkaline Phosphatase 05/09/2024 82     Total Protein 05/09/2024 7.2     AST 05/09/2024 19     Bilirubin, Total 05/09/2024 0.3     ALT 05/09/2024 22    Lab on 03/28/2024   Component Date Value    WBC 03/28/2024 10.6     nRBC 03/28/2024 0.0     RBC 03/28/2024 4.81     Hemoglobin 03/28/2024 15.7     Hematocrit 03/28/2024 46.3     MCV 03/28/2024 96     MCH 03/28/2024 32.6     MCHC 03/28/2024 33.9     RDW 03/28/2024 13.2     Platelets 03/28/2024 283     Glucose 03/28/2024 113 (H)     Sodium 03/28/2024 140     Potassium 03/28/2024 4.7     Chloride 03/28/2024 101     Bicarbonate 03/28/2024 29     Anion Gap 03/28/2024 15     Urea Nitrogen 03/28/2024 14     Creatinine 03/28/2024 0.82     eGFR 03/28/2024 >90     Calcium 03/28/2024 10.3     Albumin 03/28/2024 4.4     Alkaline Phosphatase 03/28/2024 79     Total Protein 03/28/2024 7.7     AST 03/28/2024 22     Bilirubin, Total 03/28/2024 0.5     ALT 03/28/2024 32     Ammonia 03/28/2024 39     Vitamin  B12 03/28/2024 362     Vitamin D, 25-Hydroxy, T* 03/28/2024 8 (L)     Cholesterol 03/28/2024 255 (H)     HDL-Cholesterol 03/28/2024 36.8     Cholesterol/HDL Ratio 03/28/2024 6.9     LDL Calculated 03/28/2024 159 (H)     VLDL 03/28/2024 59 (H)     Triglycerides 03/28/2024 296 (H)     Non HDL Cholesterol 03/28/2024 218 (H)     Hemoglobin A1C 03/28/2024 6.5 (H)     Estimated Average Glucose 03/28/2024 140     Uric Acid 03/28/2024 6.9     Rheumatoid Factor 03/28/2024 <10     ANDI 03/28/2024 Negative     Sedimentation Rate 03/28/2024 9    Lab on 02/19/2024   Component Date Value    Glucose 02/19/2024 99     Sodium 02/19/2024 138     Potassium 02/19/2024 4.3     Chloride 02/19/2024 101     Bicarbonate 02/19/2024 32     Anion Gap 02/19/2024 9 (L)     Urea Nitrogen 02/19/2024 10     Creatinine 02/19/2024 0.79     eGFR 02/19/2024 >90     Calcium 02/19/2024 9.9     Albumin 02/19/2024 4.3     Alkaline Phosphatase 02/19/2024 91     Total Protein 02/19/2024 7.5     AST 02/19/2024 17     Bilirubin, Total 02/19/2024 0.4     ALT 02/19/2024 21     WBC 02/19/2024 10.8     nRBC 02/19/2024 0.0     RBC 02/19/2024 4.81     Hemoglobin 02/19/2024 15.6     Hematocrit 02/19/2024 46.2     MCV 02/19/2024 96     MCH 02/19/2024 32.4     MCHC 02/19/2024 33.8     RDW 02/19/2024 13.3     Platelets 02/19/2024 296     Thyroid Stimulating Horm* 02/19/2024 2.11     Color, Urine 02/19/2024 Light-Yellow     Appearance, Urine 02/19/2024 Clear     Specific Gravity, Urine 02/19/2024 1.020     pH, Urine 02/19/2024 5.5     Protein, Urine 02/19/2024 NEGATIVE     Glucose, Urine 02/19/2024 Normal     Blood, Urine 02/19/2024 NEGATIVE     Ketones, Urine 02/19/2024 NEGATIVE     Bilirubin, Urine 02/19/2024 NEGATIVE     Urobilinogen, Urine 02/19/2024 Normal     Nitrite, Urine 02/19/2024 NEGATIVE     Leukocyte Esterase, Urine 02/19/2024 NEGATIVE         Assessment/Plan   1. SVT/aflutter. ECG done prior shows SVT with 155 bpm. Patient was started on metoprolol and  Xarelto, however after developing a rash and swelling on the bilateral ankles, he went back to his diltiazem and warfarin. Patient had stress echo done 02/2021 which showed EF 55-60% and negative for ischemia. He had RFA on 10/01/2021 with Dr Jacobsen. He then was seen at [facility] 10/15/2021 in a flutter after consuming ETOH and had DCCV in ER and was sent home. ECG done last office visit showed NSR, and monitor did not show any afib. If stays in rhythm can consider stopping Xarelto, but will have him wear two week monitor first. ECG done today shows sinus tachycardia. Will increase metoprolol to 100 mg daily.  The patient today clinically is feeling fairly well.  He is not been aware of any palpitations.  He did have a 2-week external cardiac monitor placed on 5/20/2024 results of which are still pending.     2. PVR. Negative when done 05/2019.     3. Hypothyroidism     4. Smoking. [place]. See pulmonology.      5. Hypertension     6. Family hx of CAD     7. CAD. Coronary artery calcium score of 8.44 when done 06/2021. Stress echo done 02/2021 showed EF 55-60% and negative for ischemia.  Patient remains without chest discomfort as might be expected.    8.  Peripheral vascular disease.  Patient being followed by neurology.  He has a pressure-like sensation and occasional cramping in his legs.  He has no difficulty with sleeping.  His legs began aching almost immediately when standing up and he has a very slow walker.  He did have an EMG performed twice evidently without any definitive diagnosis.  He will be scheduled for x-rays of the lumbosacral spine as well as a PVR study.  His PVR was unremarkable when performed in 5/2019.  The did have a head CT on 5/14/2024 which was unremarkable.  Lab work on 3/18/2024 included a negative arthritis panel including a negative ANDI, rheumatoid factor less than 10 uric acid 6.9 sedimentation rate of 9.    9.  Hyperlipidemia.  Patient had lipid panel 3/18/2024 with cholesterol  255.  Even though he has a very low CT coronary calcium score will begin rosuvastatin 20 mg daily.    10.  Vitamin D deficiency.  Lab work from 3/18/2024 included glycohemoglobin 6.5% vitamin D level 8 TSH 2.11.  Patient will require vitamin D replacement.    11.  Glucose intolerance.  Patient never formally diagnosed as diabetic otherwise last glycohemoglobin was 6.5% and will need to be monitored.          Orders:  Orders Placed This Encounter   Procedures    XR lumbar spine complete 4+ views      Followup Appts:  Future Appointments   Date Time Provider Department Center   6/11/2024  2:30 PM [id number] [facility] [place]   6/13/2024  3:00 PM Steven Hernadez MD [id number] [facility]   8/27/2024  2:00 PM Kenny Dias MD [id number] [facility]   9/3/2024  3:30 PM Rajat Ellis MD [id number] [facility]           ____________________________________________________________  Rajat Ellis MD  [facility]  Assistant Clinical Professor, [employer]  [facility]

## 2024-06-11 ENCOUNTER — HOSPITAL ENCOUNTER (OUTPATIENT)
Dept: RADIOLOGY | Facility: CLINIC | Age: 59
Discharge: HOME | End: 2024-06-11
Payer: COMMERCIAL

## 2024-06-11 DIAGNOSIS — F17.200 ENCOUNTER FOR SCREENING FOR MALIGNANT NEOPLASM OF LUNG IN CURRENT SMOKER WITH 30 PACK YEAR HISTORY OR GREATER: ICD-10-CM

## 2024-06-11 DIAGNOSIS — Z12.2 ENCOUNTER FOR SCREENING FOR MALIGNANT NEOPLASM OF LUNG IN CURRENT SMOKER WITH 30 PACK YEAR HISTORY OR GREATER: ICD-10-CM

## 2024-06-11 PROCEDURE — 71271 CT THORAX LUNG CANCER SCR C-: CPT

## 2024-06-13 ENCOUNTER — OFFICE VISIT (OUTPATIENT)
Dept: NEUROLOGY | Facility: CLINIC | Age: 59
End: 2024-06-13
Payer: COMMERCIAL

## 2024-06-13 VITALS
HEIGHT: 72 IN | SYSTOLIC BLOOD PRESSURE: 133 MMHG | DIASTOLIC BLOOD PRESSURE: 91 MMHG | BODY MASS INDEX: 31.83 KG/M2 | RESPIRATION RATE: 18 BRPM | WEIGHT: 235 LBS | HEART RATE: 85 BPM

## 2024-06-13 DIAGNOSIS — G60.3 IDIOPATHIC PROGRESSIVE NEUROPATHY: Primary | ICD-10-CM

## 2024-06-13 DIAGNOSIS — R41.3 MEMORY LOSS: ICD-10-CM

## 2024-06-13 PROCEDURE — 99214 OFFICE O/P EST MOD 30 MIN: CPT | Performed by: PSYCHIATRY & NEUROLOGY

## 2024-06-13 PROCEDURE — 3075F SYST BP GE 130 - 139MM HG: CPT | Performed by: PSYCHIATRY & NEUROLOGY

## 2024-06-13 PROCEDURE — 3080F DIAST BP >= 90 MM HG: CPT | Performed by: PSYCHIATRY & NEUROLOGY

## 2024-06-13 ASSESSMENT — PATIENT HEALTH QUESTIONNAIRE - PHQ9
2. FEELING DOWN, DEPRESSED OR HOPELESS: NOT AT ALL
1. LITTLE INTEREST OR PLEASURE IN DOING THINGS: NOT AT ALL
SUM OF ALL RESPONSES TO PHQ9 QUESTIONS 1 AND 2: 0

## 2024-06-13 ASSESSMENT — COLUMBIA-SUICIDE SEVERITY RATING SCALE - C-SSRS
6. HAVE YOU EVER DONE ANYTHING, STARTED TO DO ANYTHING, OR PREPARED TO DO ANYTHING TO END YOUR LIFE?: NO
1. IN THE PAST MONTH, HAVE YOU WISHED YOU WERE DEAD OR WISHED YOU COULD GO TO SLEEP AND NOT WAKE UP?: NO
2. HAVE YOU ACTUALLY HAD ANY THOUGHTS OF KILLING YOURSELF?: NO

## 2024-06-13 ASSESSMENT — ENCOUNTER SYMPTOMS
DEPRESSION: 0
LOSS OF SENSATION IN FEET: 1
OCCASIONAL FEELINGS OF UNSTEADINESS: 1

## 2024-06-13 ASSESSMENT — PAIN SCALES - GENERAL: PAINLEVEL: 8

## 2024-06-13 NOTE — PROGRESS NOTES
Date of Service: 6/13/2024  Patient: Connor Chi  MRN: [id number]    History of Present Illness:   Mr. Connor Chi is a 58-year-old man who is here for a follow up after 3-year hiatus for his sensorimotor peripheral polyneuropathy.   He was last  seen 8/19/21 and arrives alone.      Today he reports constant pain from feet to hips that appears when he sits, more so when stands and walks.  Pain is not present when laying and sleeping.  Pain begins in hips bilaterally to his anterior thigh, around the entire knee to toes both anterior and posterior.  Pain in the thighs is stabbing, with burning and aching from his knees to his toes.  His ankles tend to lock with reduced movement and legs are very heavy.      He is able to walk without assistance as long as he takes his time and denies any falls.  He is able to climb stairs taking 1 step at a time and will use the rail if it is available.  He has also noticed some worsening imbalance which has caused him to be more unsteady on his feet.      He has constant numbness begins in his fingertips and radiates up his arms posteriorly to his shoulders around to his scapula.  His symptoms are worst in his hands and wrists.  He has a history of carpal tunnel which was confirmed in EMG study.  No issues with weakness, or hand grasp but does notice some issues with dexterity.      His headaches and ptosis have resolved and denies any dizziness. He is no longer getting injuries to his shin and foot.  He has been able to drive, but pressing on the pedal continues to be a challenge.     He was seen by Pain Management at an outside facility who started him on Cymbalta which made him drowsy, Gabapentin 900 mg 3 x daily has not helped, and Topamax caused muscle twitching.  All were discontinued.  Discussion was made about a spinal cord stimulator but due to the fact that he continues to smoke, he is not able to proceed until he stops smoking.      He has reduced his drinking during  his previous appointment from around 4 beers a day, coming down from around 15 beers a day.  Today he states he has not had a beer in over a year.  He does continue to smoke but has reduced to less than a 1/2 pack a day.  He was recently diagnosed with Diabetes last month and started metformin.     To recap, he has progressive numbness and burning sensation that started in 2017 initially started at the toes that slowly progressed to his thighs bilaterally and the wrist. He underwent an EMG study by Dr. Lock September 2019 which showed mild sensorimotor peripheral neuropathy and left ulnar and median neuropathy. He underwent a skin biopsy August 2020 which showed small fiber neuropathy. When seen in 06/2021, he reported the same type of numbness distribution, except that he started having falls as well. EMG of the left upper and lower extremities was done in 06/2021, which showed the same findings found in in 2019. Upon reviewing the family history, the patient sister has been having similar issues, and the patient's mother, as well. The sister has Methylenetetrahydrofolate reductase (MTHFR) mutation.     Otherwise, the past medical history, surgical history and systems were reviewed. There are no significant changes.       BP (!) 133/91   Pulse 85   Resp 18   Ht 1.829 m (6')   Wt 107 kg (235 lb)   BMI 31.87 kg/m²        Neuromuscular Exam:     Muscle power is intact throughout.  He has high arches but no hammertoes.  Reflexes are +2 in the upper extremities and absent at the knees and ankles.  On sensory testing to light touch, temperature and pinprick, there is decreased sensation that is length dependent and includes the foot up to the midthighs, and the hands up to mid arm.  He has decreased vibratory sense in the toes, but intact elsewhere.  Proporiception is intact.      Gait was stable with a normal arm swing but he was slow.  There is no ataxia, shuffling, steppage or waddling was present.  Romberg  test was negative.    Results:    I reviewed his blood work.  HIV, alpha galactosidase and hepatitis screen were all negative.  His recent A1c was slightly abnormal at 6.5%.    I also reviewed the data of an EMG study performed in 2021.  This showed    1. A mild generalized, chronic, axonal, sensorimotor peripheral polyneuropathy with slight active denervation.  The findings are essentially similar to the EMG September 2019 done at an outside facility.  2. A left mild ulnar mononeuropathy across the elbow, chronic, without active denervation.  3. A left mild median mononeuropathy at the wrist without active denervation     Impression/Plan:     Mr. Connor Chi is a 58-year-old man with a sensorimotor peripheral polyneuropathy that started around 2017. Patient had extensive work-up (vitamin B12, MMA, ANDI panel, protein electrophoresis with immunofixation, TSH, genetic testing TTR amyloidosis, HIV, alpha galactosidase and hepatitis screen, Celiac disease serology panel) which was negative. Two EMGs done in 2019 and 2021 showed same results of mild sensorimotor peripheral neuropathy and left ulnar and median neuropathy. Skin biopsy in 2020 showed small fiber neuropathy.      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  He has reduced his drinking during his previous appointment from around 15 beers a day to around 4 beers a day.  Today he states he has not had a beer in over a year.  He also was diagnosed recently with diabetes after being borderline for several years and started metformin.    He has not tolerated or developed side effects to Cymbalta, gabapentin and Topamax.  He follows with pain management to have suggested a spinal stimulator but this was obviously not done until he stopped smoking.  He is planning to follow-up with pain management.      We discussed this with him in details.  We discussed the importance of abstinence from alcohol and control of diabetes.  He is following with pain management and will  reconsider spinal stimulator.  He will return to see me in 6 to 12 months and call for any questions.    I personally spent 30 minutes on the day of the visit completing the review of the medical record and outside records, obtaining history and performing an appropriate physical exam, patient care, counseling and education, placing orders, independently reviewing results, communicating with the patient/family and other providers, coordinating care and performing appropriate clinical documentation.

## 2024-06-14 DIAGNOSIS — R91.1 LUNG NODULE: ICD-10-CM

## 2024-06-19 ENCOUNTER — APPOINTMENT (OUTPATIENT)
Dept: PRIMARY CARE | Facility: CLINIC | Age: 59
End: 2024-06-19
Payer: COMMERCIAL

## 2024-06-19 DIAGNOSIS — G89.29 OTHER CHRONIC PAIN: ICD-10-CM

## 2024-06-19 DIAGNOSIS — M54.10 BACK PAIN WITH RADICULOPATHY: Primary | ICD-10-CM

## 2024-06-19 DIAGNOSIS — R91.1 PULMONARY NODULE: ICD-10-CM

## 2024-06-19 DIAGNOSIS — E11.9 TYPE 2 DIABETES MELLITUS WITHOUT COMPLICATION, UNSPECIFIED WHETHER LONG TERM INSULIN USE (MULTI): ICD-10-CM

## 2024-06-19 DIAGNOSIS — E66.3 OVERWEIGHT: ICD-10-CM

## 2024-06-19 PROCEDURE — 99213 OFFICE O/P EST LOW 20 MIN: CPT | Performed by: INTERNAL MEDICINE

## 2024-06-19 PROCEDURE — 4010F ACE/ARB THERAPY RXD/TAKEN: CPT | Performed by: INTERNAL MEDICINE

## 2024-06-19 PROCEDURE — 3044F HG A1C LEVEL LT 7.0%: CPT | Performed by: INTERNAL MEDICINE

## 2024-06-19 PROCEDURE — 3050F LDL-C >= 130 MG/DL: CPT | Performed by: INTERNAL MEDICINE

## 2024-06-20 NOTE — PROGRESS NOTES
Subjective   Patient ID: Connor Farr is a 58 y.o. male who presents for multiple medical issues.    Connor Farr today came here for multiple medical issues.  The patient came here for follow-up on virtual visit.  She has pulmonary CT scan.  CT scan okay.  She came here for follow-up.  She has back pain.  She saw Dr. Giles.  He is recommended pain pump.    I have personally reviewed the patient's Past Medical History, Medications, Allergies, Social History, and Family History in the EMR.    Review of Systems   All other systems reviewed and are negative.    Objective   Virtual exam. There were no vitals taken for this visit.    Physical Exam  Musculoskeletal:      Right lower leg: No edema.      Left lower leg: No edema.     LAB WORK:  Laboratory testing discussed.    Assessment/Plan   Problem List Items Addressed This Visit    None  Visit Diagnoses         Codes    Back pain with radiculopathy    -  Primary M54.10    Other chronic pain     G89.29    Relevant Orders    Referral to Pain Medicine    Pulmonary nodule     R91.1    Type 2 diabetes mellitus without complication, unspecified whether long term insulin use (Multi)     E11.9    Overweight     E66.3        1. Back pain with radiculopathy.  Refer to Dr. Campbell for pump pain.  2. Pulmonary nodule.  We are going to do the CT scan in six months without contrast.  3. Type 2 diabetes.  Blood sugar okay.  4. Overweight.  Diet and exercise.  5. Follow-up appointment in about a couple of months with repeat test.  6. Virtual visit 24 minutes.    Scribe Attestation  By signing my name below, IBetzaida Scribe attest that this documentation has been prepared under the direction and in the presence of Roseanna Esqueda MD.

## 2024-06-21 ENCOUNTER — TELEMEDICINE (OUTPATIENT)
Dept: PRIMARY CARE | Facility: CLINIC | Age: 59
End: 2024-06-21
Payer: COMMERCIAL

## 2024-06-21 DIAGNOSIS — R11.2 NAUSEA AND VOMITING, UNSPECIFIED VOMITING TYPE: ICD-10-CM

## 2024-06-21 DIAGNOSIS — R10.10 UPPER ABDOMINAL PAIN: Primary | ICD-10-CM

## 2024-06-21 DIAGNOSIS — K21.9 GASTROESOPHAGEAL REFLUX DISEASE, UNSPECIFIED WHETHER ESOPHAGITIS PRESENT: ICD-10-CM

## 2024-06-21 DIAGNOSIS — K80.63 GALLBLADDER & BILE DUCT STONE, ACUTE CHOLECYSTITIS AND OBSTRUCTION: ICD-10-CM

## 2024-06-21 DIAGNOSIS — R19.7 DIARRHEA, UNSPECIFIED TYPE: ICD-10-CM

## 2024-06-21 PROCEDURE — 99213 OFFICE O/P EST LOW 20 MIN: CPT | Performed by: INTERNAL MEDICINE

## 2024-06-21 RX ORDER — METRONIDAZOLE 250 MG/1
250 TABLET ORAL 3 TIMES DAILY
Qty: 21 TABLET | Refills: 0 | Status: SHIPPED | OUTPATIENT
Start: 2024-06-21 | End: 2024-06-28

## 2024-06-21 RX ORDER — OMEPRAZOLE 40 MG/1
40 CAPSULE, DELAYED RELEASE ORAL DAILY
Qty: 90 CAPSULE | Refills: 0 | Status: SHIPPED | OUTPATIENT
Start: 2024-06-21

## 2024-06-21 RX ORDER — CIPROFLOXACIN 500 MG/1
500 TABLET ORAL 2 TIMES DAILY
Qty: 20 TABLET | Refills: 0 | Status: SHIPPED | OUTPATIENT
Start: 2024-06-21 | End: 2024-07-01

## 2024-06-21 RX ORDER — ONDANSETRON 4 MG/1
4 TABLET, FILM COATED ORAL EVERY 8 HOURS PRN
Qty: 20 TABLET | Refills: 0 | Status: SHIPPED | OUTPATIENT
Start: 2024-06-21 | End: 2024-06-28

## 2024-06-22 NOTE — PROGRESS NOTES
Subjective   Patient ID: Connor Farr is a 58 y.o. male who presents for upper abdominal pain, nausea, vomiting, diarrhea.    Connor today came here for upper abdominal pain, nausea, vomiting, diarrhea, not feeling well, very dehydrated, not feeling good.    I have personally reviewed the patient's Past Medical History, Medications, Allergies, Social History, and Family History in the EMR.    Review of Systems   All other systems reviewed and are negative.    Objective   Virtual.  There were no vitals taken for this visit.    Physical Exam  HENT:      Mouth/Throat:      Comments: Mucous membranes dry.  Abdominal:      Tenderness: There is abdominal tenderness in the epigastric area.      Comments: On camera, the patient looks very dehydrated.   Musculoskeletal:      Right lower leg: No edema.      Left lower leg: No edema.     LAB WORK: Laboratory testing discussed.    Assessment/Plan   Problem List Items Addressed This Visit             ICD-10-CM       Gastrointestinal and Abdominal    GERD (gastroesophageal reflux disease) K21.9     Other Visit Diagnoses         Codes    Upper abdominal pain    -  Primary R10.10    Gallbladder & bile duct stone, acute cholecystitis and obstruction     K80.63    Relevant Medications    omeprazole (PriLOSEC) 40 mg DR capsule    Diarrhea, unspecified type     R19.7    Relevant Medications    ciprofloxacin (Cipro) 500 mg tablet    metroNIDAZOLE (Flagyl) 250 mg tablet    Nausea and vomiting, unspecified vomiting type     R11.2    Relevant Medications    ondansetron (Zofran) 4 mg tablet        1. Upper abdominal pain, nausea, vomiting, gastroenteritis.  Zofran given.  2. GERD, on PPI.  3. Diarrhea.  Cipro, Flagyl.  4. I strongly believe the patient should be in hospital for intravenous fluid.  He will let us know.  Show to emergency room, otherwise start medication.  5. Time spent, 28 minutes, more than half in direct patient care.    Scribe Attestation  By signing my name below, I,  Jack Stuart attest that this documentation has been prepared under the direction and in the presence of Roseanna Esqueda MD.

## 2024-07-24 ENCOUNTER — APPOINTMENT (OUTPATIENT)
Dept: PAIN MEDICINE | Facility: CLINIC | Age: 59
End: 2024-07-24
Payer: COMMERCIAL

## 2024-08-15 DIAGNOSIS — E03.9 HYPOTHYROIDISM, UNSPECIFIED TYPE: ICD-10-CM

## 2024-08-15 DIAGNOSIS — K80.63 GALLBLADDER & BILE DUCT STONE, ACUTE CHOLECYSTITIS AND OBSTRUCTION: ICD-10-CM

## 2024-08-15 RX ORDER — OMEPRAZOLE 40 MG/1
40 CAPSULE, DELAYED RELEASE ORAL DAILY
Qty: 90 CAPSULE | Refills: 0 | Status: SHIPPED | OUTPATIENT
Start: 2024-08-15

## 2024-08-15 RX ORDER — LEVOTHYROXINE SODIUM 75 UG/1
75 TABLET ORAL DAILY
Qty: 90 TABLET | Refills: 0 | Status: SHIPPED | OUTPATIENT
Start: 2024-08-15 | End: 2024-11-13

## 2024-08-27 ENCOUNTER — APPOINTMENT (OUTPATIENT)
Dept: OPHTHALMOLOGY | Facility: CLINIC | Age: 59
End: 2024-08-27
Payer: COMMERCIAL

## 2024-09-03 DIAGNOSIS — I48.3 TYPICAL ATRIAL FLUTTER (MULTI): Primary | ICD-10-CM

## 2024-09-04 RX ORDER — METOPROLOL SUCCINATE 100 MG/1
100 TABLET, EXTENDED RELEASE ORAL DAILY
Qty: 90 TABLET | Refills: 0 | Status: SHIPPED | OUTPATIENT
Start: 2024-09-04

## 2024-11-18 ENCOUNTER — TELEPHONE (OUTPATIENT)
Dept: RADIOLOGY | Facility: HOSPITAL | Age: 59
End: 2024-11-18
Payer: COMMERCIAL

## 2024-11-18 NOTE — TELEPHONE ENCOUNTER
Call placed to the patient in an effort to get him scheduled for his follow up CT of the chest. Voicemail message left with office number provided.

## 2024-11-27 ENCOUNTER — APPOINTMENT (OUTPATIENT)
Dept: PRIMARY CARE | Facility: CLINIC | Age: 59
End: 2024-11-27
Payer: COMMERCIAL

## 2024-11-27 VITALS
WEIGHT: 239 LBS | BODY MASS INDEX: 32.37 KG/M2 | HEIGHT: 72 IN | SYSTOLIC BLOOD PRESSURE: 128 MMHG | DIASTOLIC BLOOD PRESSURE: 80 MMHG

## 2024-11-27 DIAGNOSIS — R91.8 LUNG MASS: ICD-10-CM

## 2024-11-27 DIAGNOSIS — E78.00 HIGH CHOLESTEROL: ICD-10-CM

## 2024-11-27 DIAGNOSIS — K80.63 GALLBLADDER & BILE DUCT STONE, ACUTE CHOLECYSTITIS AND OBSTRUCTION: ICD-10-CM

## 2024-11-27 DIAGNOSIS — I10 HYPERTENSION, UNSPECIFIED TYPE: ICD-10-CM

## 2024-11-27 DIAGNOSIS — Z00.00 ANNUAL PHYSICAL EXAM: ICD-10-CM

## 2024-11-27 DIAGNOSIS — R22.2 SUPRACLAVICULAR MASS: Primary | ICD-10-CM

## 2024-11-27 DIAGNOSIS — Z13.29 THYROID DISORDER SCREENING: ICD-10-CM

## 2024-11-27 PROCEDURE — 3074F SYST BP LT 130 MM HG: CPT | Performed by: INTERNAL MEDICINE

## 2024-11-27 PROCEDURE — 99214 OFFICE O/P EST MOD 30 MIN: CPT | Performed by: INTERNAL MEDICINE

## 2024-11-27 PROCEDURE — 3008F BODY MASS INDEX DOCD: CPT | Performed by: INTERNAL MEDICINE

## 2024-11-27 PROCEDURE — 3079F DIAST BP 80-89 MM HG: CPT | Performed by: INTERNAL MEDICINE

## 2024-11-27 RX ORDER — OMEPRAZOLE 40 MG/1
40 CAPSULE, DELAYED RELEASE ORAL DAILY
Qty: 90 CAPSULE | Refills: 0 | Status: SHIPPED | OUTPATIENT
Start: 2024-11-27

## 2024-11-27 ASSESSMENT — ENCOUNTER SYMPTOMS
DEPRESSION: 0
OCCASIONAL FEELINGS OF UNSTEADINESS: 0
LOSS OF SENSATION IN FEET: 0

## 2024-11-28 NOTE — PROGRESS NOTES
Subjective   Patient ID: Connor Farr is a 58 y.o. male who presents for Mass.    Connor Farr today came here for pain and swelling in right upper over supraclavicular area bump, lump.  Wife noticed that it is getting bigger.  He is concerned.  He is a smoker.  He came for follow-up.    I have personally reviewed the patient's Past Medical History, Medications, Allergies, Social History, and Family History in the EMR.    Review of Systems   All other systems reviewed and are negative.    Objective   /80   Ht 1.829 m (6')   Wt 108 kg (239 lb)   BMI 32.41 kg/m²     Physical Exam  Vitals reviewed.   Neck:      Comments: Supraclavicular area, there is a lump present, palpable at the base of the neck.  Cardiovascular:      Heart sounds: Normal heart sounds, S1 normal and S2 normal. No murmur heard.     No friction rub.   Pulmonary:      Effort: Pulmonary effort is normal.      Breath sounds: Normal breath sounds and air entry.   Abdominal:      Palpations: There is no hepatomegaly, splenomegaly or mass.   Musculoskeletal:      Right lower leg: No edema.      Left lower leg: No edema.   Lymphadenopathy:      Lower Body: No right inguinal adenopathy. No left inguinal adenopathy.   Neurological:      Cranial Nerves: Cranial nerves 2-12 are intact.      Sensory: No sensory deficit.      Motor: Motor function is intact.      Deep Tendon Reflexes: Reflexes are normal and symmetric.     LAB WORK: Laboratory testing discussed.    Assessment/Plan   Problem List Items Addressed This Visit             ICD-10-CM       Cardiac and Vasculature    Hypertension I10     Other Visit Diagnoses         Codes    Supraclavicular mass    -  Primary R22.2    Lung mass     R91.8    Relevant Orders    CT chest wo IV contrast    Annual physical exam     Z00.00    Relevant Orders    CBC    Comprehensive Metabolic Panel    Lipid Panel    Hemoglobin A1C    Thyroid Stimulating Hormone    Urinalysis with Reflex Microscopic    Gallbladder &  bile duct stone, acute cholecystitis and obstruction     K80.63    Relevant Medications    omeprazole (PriLOSEC) 40 mg DR capsule    High cholesterol     E78.00    Thyroid disorder screening     Z13.29        1. Right supraclavicular lump.  It is an emphysematous bullae versus tumor, smoker.  I doubt it is a Pancoast tumor.  Immediate CT scan ordered.  2. Hypertension, okay.  3. High cholesterol, stable.  4. Thyroid, okay.  5. Blood work ordered.  6. I shall see him after CT scan and blood work.    Leanna Attestation  By signing my name below, I, Leanna Stuart attest that this documentation has been prepared under the direction and in the presence of Roseanna Esqueda MD.   All medical record entries made by the leanna were personally dictated by me I have reviewed the chart and agree the record accurately reflects my personal performance of his history physical examination and management

## 2024-11-29 DIAGNOSIS — E03.9 HYPOTHYROIDISM, UNSPECIFIED TYPE: ICD-10-CM

## 2024-11-29 RX ORDER — LEVOTHYROXINE SODIUM 75 UG/1
75 TABLET ORAL DAILY
Qty: 90 TABLET | Refills: 0 | Status: SHIPPED | OUTPATIENT
Start: 2024-11-29 | End: 2025-02-27

## 2024-12-09 DIAGNOSIS — I48.3 TYPICAL ATRIAL FLUTTER (MULTI): Primary | ICD-10-CM

## 2024-12-09 RX ORDER — METOPROLOL SUCCINATE 100 MG/1
100 TABLET, EXTENDED RELEASE ORAL DAILY
Qty: 90 TABLET | Refills: 3 | Status: SHIPPED | OUTPATIENT
Start: 2024-12-09 | End: 2025-12-09

## 2024-12-12 ENCOUNTER — HOSPITAL ENCOUNTER (OUTPATIENT)
Dept: RADIOLOGY | Facility: CLINIC | Age: 59
End: 2024-12-12
Payer: COMMERCIAL

## 2024-12-17 ENCOUNTER — TELEPHONE (OUTPATIENT)
Dept: RADIOLOGY | Facility: HOSPITAL | Age: 59
End: 2024-12-17
Payer: COMMERCIAL

## 2024-12-17 NOTE — TELEPHONE ENCOUNTER
Epic message to the patient provider to update on the patient not being scheduled for the follow up CT , despite numerous attempts to contact the patient.

## 2025-01-02 ENCOUNTER — TELEPHONE (OUTPATIENT)
Dept: RADIOLOGY | Facility: HOSPITAL | Age: 60
End: 2025-01-02
Payer: COMMERCIAL

## 2025-01-20 DIAGNOSIS — I10 PRIMARY HYPERTENSION: ICD-10-CM

## 2025-01-20 RX ORDER — LOSARTAN POTASSIUM 100 MG/1
100 TABLET ORAL DAILY
Qty: 30 TABLET | Refills: 5 | Status: SHIPPED | OUTPATIENT
Start: 2025-01-20 | End: 2025-07-19

## 2025-01-31 ENCOUNTER — OFFICE VISIT (OUTPATIENT)
Dept: PRIMARY CARE | Facility: CLINIC | Age: 60
End: 2025-01-31
Payer: COMMERCIAL

## 2025-01-31 ENCOUNTER — HOSPITAL ENCOUNTER (OUTPATIENT)
Dept: RADIOLOGY | Facility: CLINIC | Age: 60
Discharge: HOME | End: 2025-01-31
Payer: COMMERCIAL

## 2025-01-31 VITALS
WEIGHT: 236 LBS | SYSTOLIC BLOOD PRESSURE: 126 MMHG | BODY MASS INDEX: 31.97 KG/M2 | HEIGHT: 72 IN | DIASTOLIC BLOOD PRESSURE: 70 MMHG

## 2025-01-31 DIAGNOSIS — G62.1 ALCOHOL-INDUCED POLYNEUROPATHY (MULTI): ICD-10-CM

## 2025-01-31 DIAGNOSIS — I48.92 ATRIAL FLUTTER, UNSPECIFIED TYPE (MULTI): ICD-10-CM

## 2025-01-31 DIAGNOSIS — J32.9 RHINOSINUSITIS: Primary | ICD-10-CM

## 2025-01-31 DIAGNOSIS — M54.10 BACK PAIN WITH RADICULOPATHY: ICD-10-CM

## 2025-01-31 DIAGNOSIS — J02.9 PHARYNGITIS, UNSPECIFIED ETIOLOGY: ICD-10-CM

## 2025-01-31 DIAGNOSIS — J40 BRONCHITIS: ICD-10-CM

## 2025-01-31 DIAGNOSIS — J45.909 ACUTE ASTHMATIC BRONCHITIS (HHS-HCC): ICD-10-CM

## 2025-01-31 DIAGNOSIS — E11.40 TYPE 2 DIABETES MELLITUS WITH DIABETIC NEUROPATHY, UNSPECIFIED WHETHER LONG TERM INSULIN USE (MULTI): ICD-10-CM

## 2025-01-31 DIAGNOSIS — M54.10 RADICULOPATHY, UNSPECIFIED SPINAL REGION: ICD-10-CM

## 2025-01-31 DIAGNOSIS — R05.9 COUGH, UNSPECIFIED TYPE: ICD-10-CM

## 2025-01-31 PROCEDURE — 72100 X-RAY EXAM L-S SPINE 2/3 VWS: CPT

## 2025-01-31 RX ORDER — PREDNISONE 10 MG/1
TABLET ORAL
Qty: 18 TABLET | Refills: 0 | Status: SHIPPED | OUTPATIENT
Start: 2025-01-31 | End: 2025-02-09

## 2025-01-31 RX ORDER — NABUMETONE 750 MG/1
750 TABLET, FILM COATED ORAL 2 TIMES DAILY
Qty: 60 TABLET | Refills: 2 | Status: SHIPPED | OUTPATIENT
Start: 2025-01-31 | End: 2026-01-31

## 2025-01-31 RX ORDER — GUAIFENESIN 100 MG/5ML
200 SOLUTION ORAL 3 TIMES DAILY PRN
Qty: 120 ML | Refills: 0 | Status: SHIPPED | OUTPATIENT
Start: 2025-01-31 | End: 2025-02-10

## 2025-01-31 RX ORDER — ALBUTEROL SULFATE 90 UG/1
2 INHALANT RESPIRATORY (INHALATION) EVERY 4 HOURS PRN
Qty: 6.7 G | Refills: 0 | Status: SHIPPED | OUTPATIENT
Start: 2025-01-31 | End: 2026-01-31

## 2025-01-31 RX ORDER — LEVOFLOXACIN 500 MG/1
500 TABLET, FILM COATED ORAL DAILY
Qty: 10 TABLET | Refills: 0 | Status: SHIPPED | OUTPATIENT
Start: 2025-01-31 | End: 2025-02-10

## 2025-01-31 RX ORDER — LORATADINE 10 MG/1
10 TABLET ORAL DAILY
Qty: 30 TABLET | Refills: 2 | Status: SHIPPED | OUTPATIENT
Start: 2025-01-31 | End: 2025-05-01

## 2025-01-31 RX ORDER — CYCLOBENZAPRINE HCL 10 MG
10 TABLET ORAL NIGHTLY PRN
Qty: 30 TABLET | Refills: 2 | Status: SHIPPED | OUTPATIENT
Start: 2025-01-31 | End: 2025-09-28

## 2025-01-31 ASSESSMENT — ENCOUNTER SYMPTOMS
LOSS OF SENSATION IN FEET: 0
OCCASIONAL FEELINGS OF UNSTEADINESS: 0
DEPRESSION: 0

## 2025-02-01 NOTE — PROGRESS NOTES
Subjective   Patient ID: Connor Farr is a 59 y.o. male who presents for Follow-up.    1.  This gentleman today came here for cough, yellow sputum, sinus congestion, bronchitis, headache going on for the last several days.  Over-the-counter medications not helping.  2. He fell down at his own home.  No litigation.  He injured his left hip and lower back.  He came here for follow-up on various conditions.  Weak, tired, fatigued, and exhausted.  Not feeling good.    I have personally reviewed the patient's Past Medical History, Medications, Allergies, Social History, and Family History in the EMR.    Review of Systems   All other systems reviewed and are negative.    Objective   /70   Ht 1.829 m (6')   Wt 107 kg (236 lb)   BMI 32.01 kg/m²     Physical Exam  Vitals reviewed.   HENT:      Nose: Congestion present.      Comments: Postnasal drip.     Mouth/Throat:      Comments: Throat congested.  Cardiovascular:      Heart sounds: Normal heart sounds, S1 normal and S2 normal. No murmur heard.     No friction rub.   Pulmonary:      Effort: Pulmonary effort is normal.      Breath sounds: Wheezing (Bilateral) present.   Abdominal:      Palpations: There is no hepatomegaly, splenomegaly or mass.   Musculoskeletal:      Right lower leg: No edema.      Left lower leg: No edema.      Comments: Lumbosacral spine left side tenderness.  ______ tender.   Lymphadenopathy:      Lower Body: No right inguinal adenopathy. No left inguinal adenopathy.   Neurological:      Cranial Nerves: Cranial nerves 2-12 are intact.      Sensory: No sensory deficit.      Motor: Motor function is intact.      Deep Tendon Reflexes: Reflexes are normal and symmetric.     LAB WORK:  Laboratory testing discussed.    Assessment/Plan   Problem List Items Addressed This Visit             ICD-10-CM       Pulmonary and Pneumonias    Cough R05.9    Relevant Medications    levoFLOXacin (Levaquin) 500 mg tablet    loratadine (Claritin) 10 mg tablet     guaiFENesin (Robitussin) 100 mg/5 mL syrup    albuterol (Proventil HFA) 90 mcg/actuation inhaler     Other Visit Diagnoses         Codes    Rhinosinusitis    -  Primary J32.9    Back pain with radiculopathy     M54.10    Relevant Medications    nabumetone (Relafen) 750 mg tablet    cyclobenzaprine (Flexeril) 10 mg tablet    predniSONE (Deltasone) 10 mg tablet    Pharyngitis, unspecified etiology     J02.9    Bronchitis     J40    Radiculopathy, unspecified spinal region     M54.10        1. Rhinosinusitis, pharyngitis, and bronchitis.  Levaquin, Claritin, Robitussin, and Flonase.  2. Back pain with radiculopathy and fall.  X-ray of the lumbosacral spine.  Relafen, Flexeril, prednisone, therapy.  3. Follow-up in two weeks if he is not better.    Scribe Attestation  By signing my name below, Betzaida HARLEY Scribe attest that this documentation has been prepared under the direction and in the presence of Roseanna Esqueda MD.

## 2025-02-08 PROBLEM — E11.40 TYPE 2 DIABETES MELLITUS WITH DIABETIC NEUROPATHY, UNSPECIFIED WHETHER LONG TERM INSULIN USE (MULTI): Status: ACTIVE | Noted: 2025-02-08

## 2025-02-08 PROBLEM — G62.1 ALCOHOL-INDUCED POLYNEUROPATHY (MULTI): Status: ACTIVE | Noted: 2025-02-08

## 2025-02-08 PROBLEM — J45.909 ACUTE ASTHMATIC BRONCHITIS (HHS-HCC): Status: ACTIVE | Noted: 2025-02-08

## 2025-02-18 ENCOUNTER — OFFICE VISIT (OUTPATIENT)
Dept: CARDIOLOGY | Facility: CLINIC | Age: 60
End: 2025-02-18
Payer: COMMERCIAL

## 2025-02-18 VITALS
WEIGHT: 237.1 LBS | SYSTOLIC BLOOD PRESSURE: 142 MMHG | HEART RATE: 80 BPM | BODY MASS INDEX: 32.12 KG/M2 | DIASTOLIC BLOOD PRESSURE: 86 MMHG | OXYGEN SATURATION: 96 % | HEIGHT: 72 IN

## 2025-02-18 DIAGNOSIS — I48.92 ATRIAL FLUTTER, UNSPECIFIED TYPE (MULTI): Primary | ICD-10-CM

## 2025-02-18 PROCEDURE — 3079F DIAST BP 80-89 MM HG: CPT | Performed by: INTERNAL MEDICINE

## 2025-02-18 PROCEDURE — 3075F SYST BP GE 130 - 139MM HG: CPT | Performed by: INTERNAL MEDICINE

## 2025-02-18 PROCEDURE — 4010F ACE/ARB THERAPY RXD/TAKEN: CPT | Performed by: INTERNAL MEDICINE

## 2025-02-18 PROCEDURE — 99214 OFFICE O/P EST MOD 30 MIN: CPT | Performed by: INTERNAL MEDICINE

## 2025-02-18 PROCEDURE — 3008F BODY MASS INDEX DOCD: CPT | Performed by: INTERNAL MEDICINE

## 2025-02-18 ASSESSMENT — PAIN SCALES - GENERAL: PAINLEVEL_OUTOF10: 0-NO PAIN

## 2025-02-18 ASSESSMENT — ENCOUNTER SYMPTOMS
DEPRESSION: 0
LOSS OF SENSATION IN FEET: 1
OCCASIONAL FEELINGS OF UNSTEADINESS: 1

## 2025-02-18 NOTE — PROGRESS NOTES
Primary Care Physician: Roseanna Esqueda MD  Date of Visit: 02/18/2025  3:15 PM EST  Location of visit: [facility]     Chief Complaint:   Chief Complaint   Patient presents with    Follow-up    Hypertension    Abnormal ECG     SVT/Atrial Flutter     HPI / Summary:   Connor Farr is a 59 y.o. male presents for follow-up    ROS    Medical History:   He has a past medical history of Acute sinusitis, unspecified (08/29/2013), Body mass index (BMI) 27.0-27.9, adult, Fatty liver, High cholesterol, Hypertension, Personal history of other diseases of the respiratory system (11/26/2013), and Personal history of other specified conditions (08/01/2013).  Surgical Hx:   He has no past surgical history on file.   Social Hx:   He reports that he has been smoking cigarettes. He has never used smokeless tobacco. He reports that he does not drink alcohol and does not use drugs.  Family Hx:   His family history includes Diabetes type II in an other family member; Heart disease in an other family member; Hypertension in an other family member; MTHFR mutation in his sister.   Allergies:  Allergies   Allergen Reactions    Cephalexin Unknown     Outpatient Medications:  Current Outpatient Medications   Medication Instructions    albuterol (Proventil HFA) 90 mcg/actuation inhaler 2 puffs, inhalation, Every 4 hours PRN    Blood glucose monitoring meter kit kit Test twice daily    cholecalciferol (VITAMIN D3) 5,000 Units, oral, Daily    cyclobenzaprine (FLEXERIL) 10 mg, oral, Nightly PRN    FreeStyle Test strip Test twice daily    lancets misc Test twice daily    levothyroxine (SYNTHROID, LEVOXYL) 75 mcg, oral, Daily    loratadine (CLARITIN) 10 mg, oral, Daily    losartan (COZAAR) 100 mg, oral, Daily    metFORMIN (GLUCOPHAGE) 500 mg, oral, 2 times daily (morning and late afternoon)    metoprolol succinate XL (TOPROL-XL) 100 mg, oral, Daily    nabumetone (RELAFEN) 750 mg, oral, 2 times daily    omeprazole (PRILOSEC) 40 mg,  oral, Daily    rosuvastatin (CRESTOR) 20 mg, oral, Daily     Physical Exam:  Vitals:    02/18/25 1618   BP: 138/86   BP Location: Left arm   Patient Position: Sitting   BP Cuff Size: Large adult   Pulse: 75   SpO2: 96%   Weight: 108 kg (237 lb 1.6 oz)   Height: 1.829 m (6')     Wt Readings from Last 5 Encounters:   02/18/25 108 kg (237 lb 1.6 oz)   01/31/25 107 kg (236 lb)   11/27/24 108 kg (239 lb)   06/13/24 107 kg (235 lb)   06/06/24 106 kg (233 lb)     Physical Exam  JVP not elevated. Carotid impulses are 2+ without overlying bruit.   Chest exhibits fair to good air movement with completely clear breath sounds.   The cardiac rhythm is regular with no premature beats.   Normal S1 and S2. No gallop, murmur or rub, or click.   Abdomen is soft and benign without focal tenderness.   With no lower leg edema. The pedal pulses are intact.     Last Labs:  No visits with results within 6 Month(s) from this visit.   Latest known visit with results is:   Lab on 06/06/2024   Component Date Value    Glucose 06/06/2024 100 (H)     Sodium 06/06/2024 140     Potassium 06/06/2024 4.6     Chloride 06/06/2024 99     Bicarbonate 06/06/2024 29     Anion Gap 06/06/2024 17     Urea Nitrogen 06/06/2024 11     Creatinine 06/06/2024 0.94     eGFR 06/06/2024 >90     Calcium 06/06/2024 9.9     Albumin 06/06/2024 4.5     Alkaline Phosphatase 06/06/2024 89     Total Protein 06/06/2024 7.5     AST 06/06/2024 19     Bilirubin, Total 06/06/2024 0.4     ALT 06/06/2024 24     Hemoglobin A1C 06/06/2024 6.6 (H)     Estimated Average Glucose 06/06/2024 143         Assessment/Plan   1. SVT/aflutter. ECG done prior shows SVT with 155 bpm. Patient was started on metoprolol and Xarelto, however after developing a rash and swelling on the bilateral ankles, he went back to his diltiazem and warfarin. Patient had stress echo done 02/2021 which showed EF 55-60% and negative for ischemia. He had RFA on 10/01/2021 with Dr Jacobsen. He then was seen at [facility] 10/15/2021 in a flutter after consuming ETOH and had DCCV in ER and was sent home. ECG done last office visit showed NSR, and monitor did not show any afib. If stays in rhythm can consider stopping Xarelto, but will have him wear two week monitor first. ECG done today shows sinus tachycardia. Will increase metoprolol to 100 mg daily.  The patient today clinically is feeling fairly well.  He is not been aware of any palpitations.  He did have a 2-week external cardiac monitor placed on 5/20/2024 results of which are still pending.  At time of office visit 2/18/2025 patient with infrequent palpitations not as rapid up to 140/min.  They typically occur with pain or not feeling well or if he is missed his dose of metoprolol.  He does wear an Apple Watch.  He has reduced his smoking.     2. PVR. Negative when done 05/2019.     3. Hypothyroidism     4. Smoking. [place]. See pulmonology.      5. Hypertension.  Patient had lab work on 6/6/2024 which included a normal SMA panel.     6. Family hx of CAD     7. CAD. Coronary artery calcium score of 8.44 when done 06/2021. Stress echo done 02/2021 showed EF 55-60% and negative for ischemia.  Patient remains without chest discomfort as might be expected.    8.  Peripheral vascular disease.  Patient being followed by neurology.  He has a pressure-like sensation and occasional cramping in his legs.  He has no difficulty with sleeping.  His legs began aching almost immediately when standing up and he has a very slow walker.  He did have an EMG performed twice evidently without any definitive diagnosis.  He will be scheduled for x-rays of the lumbosacral spine as well as a PVR study.  His PVR was unremarkable when performed in 5/2019.  The did have a head CT on 5/14/2024 which was unremarkable.  Lab work on 3/18/2024 included a negative arthritis panel including a negative ANDI, rheumatoid factor less than 10 uric acid 6.9 sedimentation rate of 9.    9.  Hyperlipidemia.  Patient  had lipid panel 3/18/2024 with cholesterol 255.  Even though he has a very low CT coronary calcium score will begin rosuvastatin 20 mg daily.    10.  Vitamin D deficiency.  Lab work from 3/18/2024 included glycohemoglobin 6.5% vitamin D level 8 TSH 2.11.  Patient will require vitamin D replacement.    11.  Glucose intolerance.  Patient never formally diagnosed as diabetic otherwise last glycohemoglobin was 6.5% and will need to be monitored.  Lab work from 8 6/6/2024 included normal SMA panel glycohemoglobin 6.6%.  Home blood glucose values less than 120 consistently patient not taking metformin.  He is scheduled for lab work by PCP including CBC CMP lipid panel glycohemoglobin TSH urinalysis.          Orders:  No orders of the defined types were placed in this encounter.     Followup Appts:  No future appointments.          ____________________________________________________________  Rajat Ellis MD  [facility]  Assistant Clinical Professor, [employer]  [facility]

## 2025-03-03 ENCOUNTER — HOSPITAL ENCOUNTER (OUTPATIENT)
Dept: RADIOLOGY | Facility: CLINIC | Age: 60
Discharge: HOME | End: 2025-03-03
Payer: COMMERCIAL

## 2025-03-03 ENCOUNTER — OFFICE VISIT (OUTPATIENT)
Dept: PRIMARY CARE | Facility: CLINIC | Age: 60
End: 2025-03-03
Payer: COMMERCIAL

## 2025-03-03 ENCOUNTER — APPOINTMENT (OUTPATIENT)
Dept: PRIMARY CARE | Facility: CLINIC | Age: 60
End: 2025-03-03
Payer: COMMERCIAL

## 2025-03-03 VITALS
WEIGHT: 234 LBS | HEIGHT: 72 IN | DIASTOLIC BLOOD PRESSURE: 76 MMHG | BODY MASS INDEX: 31.69 KG/M2 | SYSTOLIC BLOOD PRESSURE: 142 MMHG

## 2025-03-03 DIAGNOSIS — E03.9 HYPOTHYROIDISM, UNSPECIFIED TYPE: ICD-10-CM

## 2025-03-03 DIAGNOSIS — E78.00 HIGH CHOLESTEROL: ICD-10-CM

## 2025-03-03 DIAGNOSIS — R05.9 COUGH, UNSPECIFIED TYPE: ICD-10-CM

## 2025-03-03 DIAGNOSIS — I10 HYPERTENSION, UNSPECIFIED TYPE: ICD-10-CM

## 2025-03-03 DIAGNOSIS — E11.40 TYPE 2 DIABETES MELLITUS WITH DIABETIC NEUROPATHY, UNSPECIFIED WHETHER LONG TERM INSULIN USE (MULTI): ICD-10-CM

## 2025-03-03 DIAGNOSIS — K21.9 GASTROESOPHAGEAL REFLUX DISEASE, UNSPECIFIED WHETHER ESOPHAGITIS PRESENT: Primary | ICD-10-CM

## 2025-03-03 DIAGNOSIS — H44.009 EYE INFECTION, UNSPECIFIED LATERALITY: ICD-10-CM

## 2025-03-03 PROCEDURE — 3077F SYST BP >= 140 MM HG: CPT | Performed by: INTERNAL MEDICINE

## 2025-03-03 PROCEDURE — 4010F ACE/ARB THERAPY RXD/TAKEN: CPT | Performed by: INTERNAL MEDICINE

## 2025-03-03 PROCEDURE — 3078F DIAST BP <80 MM HG: CPT | Performed by: INTERNAL MEDICINE

## 2025-03-03 PROCEDURE — 71046 X-RAY EXAM CHEST 2 VIEWS: CPT

## 2025-03-03 PROCEDURE — 3008F BODY MASS INDEX DOCD: CPT | Performed by: INTERNAL MEDICINE

## 2025-03-03 PROCEDURE — 71046 X-RAY EXAM CHEST 2 VIEWS: CPT | Performed by: RADIOLOGY

## 2025-03-03 PROCEDURE — 99214 OFFICE O/P EST MOD 30 MIN: CPT | Performed by: INTERNAL MEDICINE

## 2025-03-03 RX ORDER — DOXYCYCLINE 100 MG/1
100 CAPSULE ORAL 2 TIMES DAILY
Qty: 20 CAPSULE | Refills: 0 | Status: SHIPPED | OUTPATIENT
Start: 2025-03-03 | End: 2025-03-13

## 2025-03-03 RX ORDER — GUAIFENESIN 100 MG/5ML
200 SOLUTION ORAL 3 TIMES DAILY PRN
Qty: 120 ML | Refills: 0 | Status: SHIPPED | OUTPATIENT
Start: 2025-03-03 | End: 2025-03-13

## 2025-03-03 RX ORDER — BENZONATATE 100 MG/1
100 CAPSULE ORAL 3 TIMES DAILY PRN
Qty: 42 CAPSULE | Refills: 0 | Status: SHIPPED | OUTPATIENT
Start: 2025-03-03 | End: 2025-04-02

## 2025-03-03 RX ORDER — LORATADINE 10 MG/1
10 TABLET ORAL DAILY
Qty: 30 TABLET | Refills: 2 | Status: SHIPPED | OUTPATIENT
Start: 2025-03-03 | End: 2025-06-01

## 2025-03-03 RX ORDER — ALBUTEROL SULFATE 90 UG/1
2 INHALANT RESPIRATORY (INHALATION) EVERY 4 HOURS PRN
Qty: 6.7 G | Refills: 0 | Status: SHIPPED | OUTPATIENT
Start: 2025-03-03 | End: 2026-03-03

## 2025-03-03 ASSESSMENT — ENCOUNTER SYMPTOMS
DEPRESSION: 0
LOSS OF SENSATION IN FEET: 0
OCCASIONAL FEELINGS OF UNSTEADINESS: 0

## 2025-03-04 ENCOUNTER — APPOINTMENT (OUTPATIENT)
Dept: PRIMARY CARE | Facility: CLINIC | Age: 60
End: 2025-03-04
Payer: COMMERCIAL

## 2025-03-04 RX ORDER — TOBRAMYCIN 3 MG/ML
2 SOLUTION/ DROPS OPHTHALMIC
Qty: 5 ML | Refills: 0 | Status: SHIPPED | OUTPATIENT
Start: 2025-03-04 | End: 2025-03-18

## 2025-03-04 NOTE — PROGRESS NOTES
Subjective   Patient ID: Connor Farr is a 59 y.o. male who presents for Follow-up (various conditions).    Connor Farr today came here for cough, yellow sputum, sinus congestion, bronchitis, headache, congestion going on for last several days.  Over-the-counter medications not helping.  He came for follow-up on various conditions.    I have personally reviewed the patient's Past Medical History, Medications, Allergies, Social History, and Family History in the EMR.    Review of Systems   All other systems reviewed and are negative.    Objective   /76   Ht 1.829 m (6')   Wt 106 kg (234 lb)   BMI 31.74 kg/m²     Physical Exam  Vitals reviewed.   HENT:      Nose: Congestion present.      Comments: Postnasal drip.     Mouth/Throat:      Comments: throat congested.    Cardiovascular:      Heart sounds: Normal heart sounds, S1 normal and S2 normal. No murmur heard.     No friction rub.   Pulmonary:      Breath sounds: Decreased breath sounds (labored.) and wheezing present.   Abdominal:      Palpations: There is no hepatomegaly, splenomegaly or mass.   Musculoskeletal:      Right lower leg: No edema.      Left lower leg: No edema.   Lymphadenopathy:      Lower Body: No right inguinal adenopathy. No left inguinal adenopathy.   Neurological:      Cranial Nerves: Cranial nerves 2-12 are intact.      Sensory: No sensory deficit.      Motor: Motor function is intact.      Deep Tendon Reflexes: Reflexes are normal and symmetric.     LAB WORK: Laboratory testing discussed.    Assessment/Plan   Problem List Items Addressed This Visit             ICD-10-CM    Cough R05.9    Relevant Medications    doxycycline (Vibramycin) 100 mg capsule    guaiFENesin (Robitussin) 100 mg/5 mL syrup    benzonatate (Tessalon) 100 mg capsule    loratadine (Claritin) 10 mg tablet    albuterol (Proventil HFA) 90 mcg/actuation inhaler    Other Relevant Orders    XR chest 2 views    GERD (gastroesophageal reflux disease) - Primary K21.9     Hypertension I10    Relevant Orders    CBC    Thyroid Stimulating Hormone    Urinalysis with Reflex Microscopic    Type 2 diabetes mellitus with diabetic neuropathy, unspecified whether long term insulin use (Multi) E11.40    Relevant Orders    Hemoglobin A1C     Other Visit Diagnoses         Codes    High cholesterol     E78.00    Relevant Orders    Comprehensive Metabolic Panel    Lipid Panel        1. Acute asthmatic bronchitis and cough, not a good response to antibiotic.  Ceftin, doxycycline, Claritin, Robitussin, Tessalon Perles, albuterol.  Chest x-ray ordered.  I will see him in two weeks if he is not better.  2. Question of type 2 diabetes.  Due for blood work.  3. Hypertension, okay.  4. Cholesterol.  Monitor.  5. GERD, on PPI.  6. I urged him to do the blood work and see me if he is not better.    Scribe Attestation  By signing my name below, IBetzaida Scribe attest that this documentation has been prepared under the direction and in the presence of Roseanna Esqueda MD.     All medical record entries made by the scribe were personally dictated by me I have reviewed the chart and agree the record accurately reflects my personal performance of his history physical examination and management

## 2025-03-07 DIAGNOSIS — E03.9 HYPOTHYROIDISM, UNSPECIFIED TYPE: ICD-10-CM

## 2025-03-07 RX ORDER — LEVOTHYROXINE SODIUM 75 UG/1
75 TABLET ORAL DAILY
Qty: 90 TABLET | Refills: 0 | Status: SHIPPED | OUTPATIENT
Start: 2025-03-07

## 2025-03-20 ENCOUNTER — OFFICE VISIT (OUTPATIENT)
Dept: PRIMARY CARE | Facility: CLINIC | Age: 60
End: 2025-03-20
Payer: COMMERCIAL

## 2025-03-20 VITALS
WEIGHT: 235 LBS | SYSTOLIC BLOOD PRESSURE: 142 MMHG | HEIGHT: 72 IN | BODY MASS INDEX: 31.83 KG/M2 | DIASTOLIC BLOOD PRESSURE: 80 MMHG

## 2025-03-20 DIAGNOSIS — I10 HYPERTENSION, UNSPECIFIED TYPE: ICD-10-CM

## 2025-03-20 DIAGNOSIS — I73.9 CLAUDICATION (CMS-HCC): ICD-10-CM

## 2025-03-20 DIAGNOSIS — L03.039 PARONYCHIA OF GREAT TOE: Primary | ICD-10-CM

## 2025-03-20 DIAGNOSIS — J40 BRONCHITIS: ICD-10-CM

## 2025-03-20 PROCEDURE — 99214 OFFICE O/P EST MOD 30 MIN: CPT | Performed by: INTERNAL MEDICINE

## 2025-03-20 PROCEDURE — 4010F ACE/ARB THERAPY RXD/TAKEN: CPT | Performed by: INTERNAL MEDICINE

## 2025-03-20 PROCEDURE — 3008F BODY MASS INDEX DOCD: CPT | Performed by: INTERNAL MEDICINE

## 2025-03-20 PROCEDURE — 3077F SYST BP >= 140 MM HG: CPT | Performed by: INTERNAL MEDICINE

## 2025-03-20 PROCEDURE — 3079F DIAST BP 80-89 MM HG: CPT | Performed by: INTERNAL MEDICINE

## 2025-03-20 RX ORDER — BACITRACIN ZINC 500 UNIT/G
OINTMENT (GRAM) TOPICAL 2 TIMES DAILY
Qty: 60 G | Refills: 1 | Status: SHIPPED | OUTPATIENT
Start: 2025-03-20

## 2025-03-20 RX ORDER — AMOXICILLIN AND CLAVULANATE POTASSIUM 875; 125 MG/1; MG/1
875 TABLET, FILM COATED ORAL 2 TIMES DAILY
Qty: 20 TABLET | Refills: 0 | Status: SHIPPED | OUTPATIENT
Start: 2025-03-20 | End: 2025-03-30

## 2025-03-21 NOTE — PROGRESS NOTES
Subjective   Patient ID: Connor Farr is a 59 y.o. male who presents for Follow-up (various conditions).    This gentleman today came here for pain and swelling in right foot big toe, red, inflamed, tender, infected, not feeling good.  He came for follow-up on various conditions.  His bronchitis and cough is getting much better.    I have personally reviewed the patient's Past Medical History, Medications, Allergies, Social History, and Family History in the EMR.    Review of Systems   All other systems reviewed and are negative.    Objective   /80   Ht 1.829 m (6')   Wt 107 kg (235 lb)   BMI 31.87 kg/m²     Physical Exam  Vitals reviewed.   Cardiovascular:      Heart sounds: Normal heart sounds, S1 normal and S2 normal. No murmur heard.     No friction rub.   Pulmonary:      Effort: Pulmonary effort is normal.      Breath sounds: Normal breath sounds and air entry.   Abdominal:      Palpations: There is no hepatomegaly, splenomegaly or mass.   Musculoskeletal:      Right lower leg: No edema.      Left lower leg: No edema.   Feet:      Comments: Right foot big toe red, inflamed, tender, paronychia present.  Pulses weak.  Lymphadenopathy:      Lower Body: No right inguinal adenopathy. No left inguinal adenopathy.   Neurological:      Cranial Nerves: Cranial nerves 2-12 are intact.      Sensory: No sensory deficit.      Motor: Motor function is intact.      Deep Tendon Reflexes: Reflexes are normal and symmetric.     LAB WORK: Laboratory testing discussed.    Assessment/Plan   Problem List Items Addressed This Visit             ICD-10-CM    Claudication (CMS-HCC) I73.9    Relevant Orders    Vascular US PVR with exercise    Hypertension I10     Other Visit Diagnoses         Codes    Paronychia of great toe    -  Primary L03.039    Relevant Medications    amoxicillin-pot clavulanate (Augmentin) 875-125 mg tablet    bacitracin 500 unit/gram ointment    Bronchitis     J40        1. Infection.  Soak in hot water,  bacitracin, Augmentin.  2. Toes are numb, could be Raynaud’s phenomenon.  Referred to Vascular Surgery.  3. Bronchitis and cough, better.  4. Hypertension, okay.  5. Follow-up in a couple of weeks.    Beaibe Attestation  By signing my name below, IBetzaida Scribe attest that this documentation has been prepared under the direction and in the presence of Roseanna Esqueda MD.     All medical record entries made by the leanna were personally dictated by me I have reviewed the chart and agree the record accurately reflects my personal performance of his history physical examination and management

## 2025-03-28 ENCOUNTER — OFFICE VISIT (OUTPATIENT)
Dept: VASCULAR SURGERY | Facility: CLINIC | Age: 60
End: 2025-03-28
Payer: COMMERCIAL

## 2025-03-28 ENCOUNTER — ANCILLARY PROCEDURE (OUTPATIENT)
Dept: VASCULAR MEDICINE | Facility: CLINIC | Age: 60
End: 2025-03-28
Payer: COMMERCIAL

## 2025-03-28 VITALS — HEART RATE: 70 BPM | SYSTOLIC BLOOD PRESSURE: 158 MMHG | DIASTOLIC BLOOD PRESSURE: 104 MMHG | RESPIRATION RATE: 20 BRPM

## 2025-03-28 DIAGNOSIS — I73.9 CLAUDICATION: ICD-10-CM

## 2025-03-28 DIAGNOSIS — I73.9 PERIPHERAL VASCULAR DISEASE (CMS-HCC): Primary | ICD-10-CM

## 2025-03-28 DIAGNOSIS — R23.0 TOE CYANOSIS: ICD-10-CM

## 2025-03-28 DIAGNOSIS — I70.213 ATHEROSCLEROSIS OF NATIVE ARTERIES OF EXTREMITIES WITH INTERMITTENT CLAUDICATION, BILATERAL LEGS: ICD-10-CM

## 2025-03-28 PROCEDURE — 3080F DIAST BP >= 90 MM HG: CPT | Performed by: NURSE PRACTITIONER

## 2025-03-28 PROCEDURE — 3077F SYST BP >= 140 MM HG: CPT | Performed by: NURSE PRACTITIONER

## 2025-03-28 PROCEDURE — 99204 OFFICE O/P NEW MOD 45 MIN: CPT | Performed by: NURSE PRACTITIONER

## 2025-03-28 PROCEDURE — 4010F ACE/ARB THERAPY RXD/TAKEN: CPT | Performed by: NURSE PRACTITIONER

## 2025-03-28 PROCEDURE — 93924 LWR XTR VASC STDY BILAT: CPT | Performed by: INTERNAL MEDICINE

## 2025-03-28 PROCEDURE — 93924 LWR XTR VASC STDY BILAT: CPT

## 2025-03-28 PROCEDURE — 99214 OFFICE O/P EST MOD 30 MIN: CPT | Mod: 25 | Performed by: NURSE PRACTITIONER

## 2025-03-28 ASSESSMENT — PAIN SCALES - GENERAL: PAINLEVEL_OUTOF10: 0-NO PAIN

## 2025-03-28 ASSESSMENT — PATIENT HEALTH QUESTIONNAIRE - PHQ9
1. LITTLE INTEREST OR PLEASURE IN DOING THINGS: NOT AT ALL
2. FEELING DOWN, DEPRESSED OR HOPELESS: NOT AT ALL
SUM OF ALL RESPONSES TO PHQ9 QUESTIONS 1 AND 2: 0

## 2025-03-28 ASSESSMENT — ENCOUNTER SYMPTOMS
DEPRESSION: 0
OCCASIONAL FEELINGS OF UNSTEADINESS: 0
LOSS OF SENSATION IN FEET: 1

## 2025-03-28 NOTE — PROGRESS NOTES
Chief Complaint  Discoloration of Left Great Toe/Claudication    History Of Present Illness  This is a 59-year-old white male, patient of Dr. VALERIA Esqueda, who presented to the office today with a 2-week history of discoloration to his left great toe.  He states that the toe causes him moderate discomfort.  He denies recent trauma to his foot.  The patient has a history of neuropathy and has limited feeling to his feet.  He has been a borderline diabetic x 6 months.    The patient has a several year history of claudication within his bilateral calves with short distances of ambulation.  He states that frequently his right hip/buttock will also cause him discomfort while walking.  The discomfort interferes with his activities of daily living.  The patient has not been evaluated for peripheral vascular disease in the past.  He had a PVR study with ABIs completed today, which were abnormal.  His right YONG was approximately 0.8, dropping to 0.4 with exercise.  His left YONG was 0.7, dropping to 0.3 with exercise.       Past Medical History  He has a past medical history of Acute sinusitis, unspecified (08/29/2013), Body mass index (BMI) 27.0-27.9, adult, Fatty liver, High cholesterol, Hypertension, Personal history of other diseases of the respiratory system (11/26/2013), and Personal history of other specified conditions (08/01/2013).    Surgical History  He has no past surgical history on file.     Social History  He reports that he has quit smoking. His smoking use included cigarettes. He has never used smokeless tobacco. He reports that he does not drink alcohol and does not use drugs.    Family History  Family History   Problem Relation Name Age of Onset    Other (MTHFR mutation) Sister      Hypertension Other FHx         essential    Heart disease Other FHx     Diabetes type II Other FHx         Allergies  Cephalexin    Review of Systems   CONSTITUTIONAL: Denies weight loss, fever and chills.  HEENT: Denies changes in  vision and hearing.  RESPIRATORY: Denies SOB and cough.  CV: Denies palpitations and CP.  GI: Denies abdominal pain, nausea, vomiting and diarrhea.   : Denies dysuria and urinary frequency.   MSK: Denies myalgia and joint pain.  SKIN: Positive for discoloration to left great toe.  VASC: Positive for bilateral lower extremity claudication.  NEUROLOGICAL: Denies headache and syncope.  PSYCHIATRIC: Denies recent changes in mood. Denies anxiety and depression.    Physical exam  Constitutional:  Alert and oriented to person, place, and time.  In no acute distress.    HEENT:  Head is atraumatic, normocephalic.    Neck:  Soft and nontender.  No cervical bruits present.    Respiratory:  Lungs clear to auscultation.    Cardiac:  Regular rate and rhythm.  No murmurs present.      Abdominal:  Soft, nontender, nondistended, with bowel sounds present.   Extremities:  Lower extremities warm to touch.  Blue/purple discoloration present to tip and plantar aspect of left great toe.  Toe painful upon palpation.  No edema or open wounds present within bilateral legs or feet.      Pulse exam: Right femoral pulse not palpable.  Left femoral pulse palpable.  Bilateral dorsalis pedis and posterior tibial pulses not palpable.     Musculoskeletal:  Moves extremities freely.  Dermatological: As above.  Neurological:  No focal deficits.    Psych:  Calm, cooperative.  Answers questions appropriately.      Last Recorded Vitals  BP (!) 158/104 (BP Location: Right arm, Patient Position: Sitting, BP Cuff Size: Large adult)   Pulse 70   Resp 20        Assessment/Plan     Cyanosis left great toe  Peripheral vascular disease  Claudication     This is a 59-year-old male with a 2-week history of cyanosis to his left great toe.  The toe is painful upon palpation.  The patient also has a several year history of right hip/buttock and bilateral calf claudication with short distances of ambulation.    The patient had a PVR with YONG study completed  today.  The patient has peripheral vascular disease, causing his above-mentioned symptoms.  He will have a CTA of his abdomen/pelvis with runoff completed over the next several days.  The patient will then meet with Dr. Patterson to discuss those results.  Further recommendations will be made.    SHAYLA Owen-CNP

## 2025-03-31 ENCOUNTER — HOSPITAL ENCOUNTER (OUTPATIENT)
Dept: RADIOLOGY | Facility: HOSPITAL | Age: 60
Discharge: HOME | End: 2025-03-31
Payer: COMMERCIAL

## 2025-03-31 DIAGNOSIS — I73.9 PERIPHERAL VASCULAR DISEASE, UNSPECIFIED (CMS-HCC): Primary | ICD-10-CM

## 2025-03-31 DIAGNOSIS — I73.9 PERIPHERAL VASCULAR DISEASE, UNSPECIFIED (CMS-HCC): ICD-10-CM

## 2025-03-31 LAB
CREAT SERPL-MCNC: 0.86 MG/DL (ref 0.5–1.3)
EGFRCR SERPLBLD CKD-EPI 2021: >90 ML/MIN/1.73M*2

## 2025-03-31 PROCEDURE — 36415 COLL VENOUS BLD VENIPUNCTURE: CPT | Performed by: NURSE PRACTITIONER

## 2025-03-31 PROCEDURE — 75635 CT ANGIO ABDOMINAL ARTERIES: CPT

## 2025-03-31 PROCEDURE — 82565 ASSAY OF CREATININE: CPT | Performed by: NURSE PRACTITIONER

## 2025-03-31 PROCEDURE — 2550000001 HC RX 255 CONTRASTS: Performed by: NURSE PRACTITIONER

## 2025-03-31 RX ADMIN — IOHEXOL 75 ML: 350 INJECTION, SOLUTION INTRAVENOUS at 09:07

## 2025-04-02 ENCOUNTER — APPOINTMENT (OUTPATIENT)
Dept: VASCULAR MEDICINE | Facility: CLINIC | Age: 60
End: 2025-04-02
Payer: COMMERCIAL

## 2025-04-03 ENCOUNTER — APPOINTMENT (OUTPATIENT)
Dept: RADIOLOGY | Facility: HOSPITAL | Age: 60
DRG: 271 | End: 2025-04-03
Payer: COMMERCIAL

## 2025-04-03 ENCOUNTER — APPOINTMENT (OUTPATIENT)
Dept: VASCULAR SURGERY | Facility: CLINIC | Age: 60
End: 2025-04-03
Payer: COMMERCIAL

## 2025-04-03 ENCOUNTER — HOSPITAL ENCOUNTER (INPATIENT)
Facility: HOSPITAL | Age: 60
End: 2025-04-03
Attending: EMERGENCY MEDICINE | Admitting: INTERNAL MEDICINE
Payer: COMMERCIAL

## 2025-04-03 ENCOUNTER — OFFICE VISIT (OUTPATIENT)
Dept: VASCULAR SURGERY | Facility: CLINIC | Age: 60
End: 2025-04-03
Payer: COMMERCIAL

## 2025-04-03 VITALS
WEIGHT: 241.3 LBS | BODY MASS INDEX: 32.68 KG/M2 | HEIGHT: 72 IN | DIASTOLIC BLOOD PRESSURE: 89 MMHG | OXYGEN SATURATION: 96 % | SYSTOLIC BLOOD PRESSURE: 141 MMHG | HEART RATE: 82 BPM

## 2025-04-03 DIAGNOSIS — R23.0 TOE CYANOSIS: ICD-10-CM

## 2025-04-03 DIAGNOSIS — K57.92 ACUTE DIVERTICULITIS: ICD-10-CM

## 2025-04-03 DIAGNOSIS — F17.200 TOBACCO USE DISORDER: Primary | ICD-10-CM

## 2025-04-03 DIAGNOSIS — I73.9 PERIPHERAL VASCULAR DISEASE (CMS-HCC): Primary | ICD-10-CM

## 2025-04-03 DIAGNOSIS — I73.9 PAD (PERIPHERAL ARTERY DISEASE) (CMS-HCC): ICD-10-CM

## 2025-04-03 DIAGNOSIS — M86.9 OSTEOMYELITIS OF LEFT FOOT, UNSPECIFIED TYPE (MULTI): Primary | ICD-10-CM

## 2025-04-03 DIAGNOSIS — I73.9 CLAUDICATION: ICD-10-CM

## 2025-04-03 DIAGNOSIS — L03.116 CELLULITIS OF LEFT FOOT: ICD-10-CM

## 2025-04-03 DIAGNOSIS — M86.672 OTHER CHRONIC OSTEOMYELITIS OF LEFT FOOT: ICD-10-CM

## 2025-04-03 DIAGNOSIS — E11.40 TYPE 2 DIABETES MELLITUS WITH DIABETIC NEUROPATHY, UNSPECIFIED WHETHER LONG TERM INSULIN USE (MULTI): ICD-10-CM

## 2025-04-03 LAB
ALBUMIN SERPL BCP-MCNC: 3.9 G/DL (ref 3.4–5)
ALP SERPL-CCNC: 69 U/L (ref 33–120)
ALT SERPL W P-5'-P-CCNC: 19 U/L (ref 10–52)
ANION GAP SERPL CALCULATED.3IONS-SCNC: 11 MMOL/L (ref 10–20)
AST SERPL W P-5'-P-CCNC: 20 U/L (ref 9–39)
BASOPHILS # BLD AUTO: 0.06 X10*3/UL (ref 0–0.1)
BASOPHILS NFR BLD AUTO: 0.5 %
BILIRUB SERPL-MCNC: 0.4 MG/DL (ref 0–1.2)
BUN SERPL-MCNC: 7 MG/DL (ref 6–23)
CALCIUM SERPL-MCNC: 9 MG/DL (ref 8.6–10.3)
CHLORIDE SERPL-SCNC: 102 MMOL/L (ref 98–107)
CO2 SERPL-SCNC: 25 MMOL/L (ref 21–32)
CREAT SERPL-MCNC: 0.65 MG/DL (ref 0.5–1.3)
CRP SERPL-MCNC: 2.5 MG/DL
EGFRCR SERPLBLD CKD-EPI 2021: >90 ML/MIN/1.73M*2
EOSINOPHIL # BLD AUTO: 0.09 X10*3/UL (ref 0–0.7)
EOSINOPHIL NFR BLD AUTO: 0.8 %
ERYTHROCYTE [DISTWIDTH] IN BLOOD BY AUTOMATED COUNT: 13.3 % (ref 11.5–14.5)
ERYTHROCYTE [SEDIMENTATION RATE] IN BLOOD BY WESTERGREN METHOD: 28 MM/H (ref 0–20)
GLUCOSE SERPL-MCNC: 100 MG/DL (ref 74–99)
HCT VFR BLD AUTO: 42.3 % (ref 41–52)
HGB BLD-MCNC: 14.4 G/DL (ref 13.5–17.5)
IMM GRANULOCYTES # BLD AUTO: 0.05 X10*3/UL (ref 0–0.7)
IMM GRANULOCYTES NFR BLD AUTO: 0.4 % (ref 0–0.9)
LACTATE SERPL-SCNC: 1.6 MMOL/L (ref 0.4–2)
LYMPHOCYTES # BLD AUTO: 2.02 X10*3/UL (ref 1.2–4.8)
LYMPHOCYTES NFR BLD AUTO: 16.9 %
MCH RBC QN AUTO: 31.9 PG (ref 26–34)
MCHC RBC AUTO-ENTMCNC: 34 G/DL (ref 32–36)
MCV RBC AUTO: 94 FL (ref 80–100)
MONOCYTES # BLD AUTO: 0.86 X10*3/UL (ref 0.1–1)
MONOCYTES NFR BLD AUTO: 7.2 %
NEUTROPHILS # BLD AUTO: 8.88 X10*3/UL (ref 1.2–7.7)
NEUTROPHILS NFR BLD AUTO: 74.2 %
NRBC BLD-RTO: 0 /100 WBCS (ref 0–0)
PLATELET # BLD AUTO: 232 X10*3/UL (ref 150–450)
POTASSIUM SERPL-SCNC: 3.9 MMOL/L (ref 3.5–5.3)
PROT SERPL-MCNC: 7 G/DL (ref 6.4–8.2)
RBC # BLD AUTO: 4.51 X10*6/UL (ref 4.5–5.9)
SODIUM SERPL-SCNC: 134 MMOL/L (ref 136–145)
WBC # BLD AUTO: 12 X10*3/UL (ref 4.4–11.3)

## 2025-04-03 PROCEDURE — 87075 CULTR BACTERIA EXCEPT BLOOD: CPT | Mod: WESLAB

## 2025-04-03 PROCEDURE — 83605 ASSAY OF LACTIC ACID: CPT

## 2025-04-03 PROCEDURE — 96375 TX/PRO/DX INJ NEW DRUG ADDON: CPT

## 2025-04-03 PROCEDURE — 2500000002 HC RX 250 W HCPCS SELF ADMINISTERED DRUGS (ALT 637 FOR MEDICARE OP, ALT 636 FOR OP/ED): Performed by: NURSE PRACTITIONER

## 2025-04-03 PROCEDURE — 3079F DIAST BP 80-89 MM HG: CPT | Performed by: NURSE PRACTITIONER

## 2025-04-03 PROCEDURE — 80053 COMPREHEN METABOLIC PANEL: CPT

## 2025-04-03 PROCEDURE — 36415 COLL VENOUS BLD VENIPUNCTURE: CPT

## 2025-04-03 PROCEDURE — 99285 EMERGENCY DEPT VISIT HI MDM: CPT | Mod: 25 | Performed by: EMERGENCY MEDICINE

## 2025-04-03 PROCEDURE — 1100000001 HC PRIVATE ROOM DAILY

## 2025-04-03 PROCEDURE — 71046 X-RAY EXAM CHEST 2 VIEWS: CPT | Performed by: RADIOLOGY

## 2025-04-03 PROCEDURE — 2500000001 HC RX 250 WO HCPCS SELF ADMINISTERED DRUGS (ALT 637 FOR MEDICARE OP): Performed by: NURSE PRACTITIONER

## 2025-04-03 PROCEDURE — 3008F BODY MASS INDEX DOCD: CPT | Performed by: NURSE PRACTITIONER

## 2025-04-03 PROCEDURE — 71046 X-RAY EXAM CHEST 2 VIEWS: CPT

## 2025-04-03 PROCEDURE — 86140 C-REACTIVE PROTEIN: CPT

## 2025-04-03 PROCEDURE — 4010F ACE/ARB THERAPY RXD/TAKEN: CPT | Performed by: NURSE PRACTITIONER

## 2025-04-03 PROCEDURE — 85025 COMPLETE CBC W/AUTO DIFF WBC: CPT

## 2025-04-03 PROCEDURE — 2500000004 HC RX 250 GENERAL PHARMACY W/ HCPCS (ALT 636 FOR OP/ED): Mod: JZ

## 2025-04-03 PROCEDURE — 2500000001 HC RX 250 WO HCPCS SELF ADMINISTERED DRUGS (ALT 637 FOR MEDICARE OP): Performed by: INTERNAL MEDICINE

## 2025-04-03 PROCEDURE — 73630 X-RAY EXAM OF FOOT: CPT | Mod: LEFT SIDE | Performed by: RADIOLOGY

## 2025-04-03 PROCEDURE — 3077F SYST BP >= 140 MM HG: CPT | Performed by: NURSE PRACTITIONER

## 2025-04-03 PROCEDURE — 85652 RBC SED RATE AUTOMATED: CPT

## 2025-04-03 PROCEDURE — 96365 THER/PROPH/DIAG IV INF INIT: CPT

## 2025-04-03 PROCEDURE — 73630 X-RAY EXAM OF FOOT: CPT | Mod: LT

## 2025-04-03 PROCEDURE — 99213 OFFICE O/P EST LOW 20 MIN: CPT | Performed by: NURSE PRACTITIONER

## 2025-04-03 RX ORDER — KETOROLAC TROMETHAMINE 30 MG/ML
30 INJECTION, SOLUTION INTRAMUSCULAR; INTRAVENOUS 2 TIMES DAILY PRN
Status: DISCONTINUED | OUTPATIENT
Start: 2025-04-03 | End: 2025-04-03

## 2025-04-03 RX ORDER — OXYCODONE HYDROCHLORIDE 5 MG/1
5 TABLET ORAL EVERY 6 HOURS PRN
Status: DISCONTINUED | OUTPATIENT
Start: 2025-04-03 | End: 2025-04-03

## 2025-04-03 RX ORDER — IPRATROPIUM BROMIDE AND ALBUTEROL SULFATE 2.5; .5 MG/3ML; MG/3ML
3 SOLUTION RESPIRATORY (INHALATION) ONCE
Status: ACTIVE | OUTPATIENT
Start: 2025-04-03

## 2025-04-03 RX ORDER — ALBUTEROL SULFATE 0.83 MG/ML
3 SOLUTION RESPIRATORY (INHALATION) EVERY 4 HOURS PRN
Status: ACTIVE | OUTPATIENT
Start: 2025-04-03

## 2025-04-03 RX ORDER — LOSARTAN POTASSIUM 100 MG/1
100 TABLET ORAL NIGHTLY
Status: DISPENSED | OUTPATIENT
Start: 2025-04-03

## 2025-04-03 RX ORDER — MEROPENEM 1 G/1
1 INJECTION, POWDER, FOR SOLUTION INTRAVENOUS ONCE
Status: DISCONTINUED | OUTPATIENT
Start: 2025-04-03 | End: 2025-04-03

## 2025-04-03 RX ORDER — OXYCODONE AND ACETAMINOPHEN 5; 325 MG/1; MG/1
1 TABLET ORAL EVERY 6 HOURS PRN
Status: DISPENSED | OUTPATIENT
Start: 2025-04-03

## 2025-04-03 RX ORDER — CYCLOBENZAPRINE HCL 10 MG
10 TABLET ORAL NIGHTLY PRN
Status: DISPENSED | OUTPATIENT
Start: 2025-04-03

## 2025-04-03 RX ORDER — VANCOMYCIN 2 G/400ML
20 INJECTION, SOLUTION INTRAVENOUS EVERY 12 HOURS
Status: DISCONTINUED | OUTPATIENT
Start: 2025-04-03 | End: 2025-04-03

## 2025-04-03 RX ORDER — VANCOMYCIN 1.75 G/350ML
1250 INJECTION, SOLUTION INTRAVENOUS EVERY 12 HOURS
Status: DISCONTINUED | OUTPATIENT
Start: 2025-04-04 | End: 2025-04-05

## 2025-04-03 RX ORDER — LEVOTHYROXINE SODIUM 75 UG/1
75 TABLET ORAL NIGHTLY
Status: DISPENSED | OUTPATIENT
Start: 2025-04-03

## 2025-04-03 RX ORDER — PANTOPRAZOLE SODIUM 20 MG/1
20 TABLET, DELAYED RELEASE ORAL
Status: DISPENSED | OUTPATIENT
Start: 2025-04-04

## 2025-04-03 RX ORDER — ROSUVASTATIN CALCIUM 20 MG/1
20 TABLET, COATED ORAL NIGHTLY
Status: DISPENSED | OUTPATIENT
Start: 2025-04-03

## 2025-04-03 RX ORDER — METOPROLOL SUCCINATE 100 MG/1
100 TABLET, EXTENDED RELEASE ORAL NIGHTLY
Status: DISPENSED | OUTPATIENT
Start: 2025-04-03

## 2025-04-03 RX ORDER — VANCOMYCIN HYDROCHLORIDE 1 G/20ML
INJECTION, POWDER, LYOPHILIZED, FOR SOLUTION INTRAVENOUS DAILY PRN
Status: DISPENSED | OUTPATIENT
Start: 2025-04-03

## 2025-04-03 RX ORDER — VANCOMYCIN 2 G/400ML
2 INJECTION, SOLUTION INTRAVENOUS ONCE
Status: COMPLETED | OUTPATIENT
Start: 2025-04-03 | End: 2025-04-03

## 2025-04-03 RX ORDER — HYDROMORPHONE HYDROCHLORIDE 1 MG/ML
1 INJECTION, SOLUTION INTRAMUSCULAR; INTRAVENOUS; SUBCUTANEOUS ONCE
Status: COMPLETED | OUTPATIENT
Start: 2025-04-03 | End: 2025-04-03

## 2025-04-03 RX ADMIN — SODIUM CHLORIDE 1000 ML: 9 INJECTION, SOLUTION INTRAVENOUS at 17:32

## 2025-04-03 RX ADMIN — LEVOTHYROXINE SODIUM 75 MCG: 0.07 TABLET ORAL at 21:55

## 2025-04-03 RX ADMIN — LOSARTAN POTASSIUM 100 MG: 100 TABLET, FILM COATED ORAL at 21:55

## 2025-04-03 RX ADMIN — OXYCODONE HYDROCHLORIDE AND ACETAMINOPHEN 1 TABLET: 5; 325 TABLET ORAL at 21:55

## 2025-04-03 RX ADMIN — PIPERACILLIN SODIUM AND TAZOBACTAM SODIUM 4.5 G: 4; .5 INJECTION, SOLUTION INTRAVENOUS at 17:31

## 2025-04-03 RX ADMIN — METOPROLOL SUCCINATE 100 MG: 100 TABLET, EXTENDED RELEASE ORAL at 21:55

## 2025-04-03 RX ADMIN — HYDROMORPHONE HYDROCHLORIDE 1 MG: 1 INJECTION, SOLUTION INTRAMUSCULAR; INTRAVENOUS; SUBCUTANEOUS at 17:31

## 2025-04-03 RX ADMIN — VANCOMYCIN 2 G: 2 INJECTION, SOLUTION INTRAVENOUS at 17:31

## 2025-04-03 RX ADMIN — ROSUVASTATIN CALCIUM 20 MG: 20 TABLET, FILM COATED ORAL at 21:55

## 2025-04-03 SDOH — ECONOMIC STABILITY: FOOD INSECURITY: WITHIN THE PAST 12 MONTHS, YOU WORRIED THAT YOUR FOOD WOULD RUN OUT BEFORE YOU GOT THE MONEY TO BUY MORE.: NEVER TRUE

## 2025-04-03 SDOH — SOCIAL STABILITY: SOCIAL INSECURITY: ARE THERE ANY APPARENT SIGNS OF INJURIES/BEHAVIORS THAT COULD BE RELATED TO ABUSE/NEGLECT?: NO

## 2025-04-03 SDOH — SOCIAL STABILITY: SOCIAL INSECURITY: WITHIN THE LAST YEAR, HAVE YOU BEEN AFRAID OF YOUR PARTNER OR EX-PARTNER?: NO

## 2025-04-03 SDOH — SOCIAL STABILITY: SOCIAL INSECURITY
WITHIN THE LAST YEAR, HAVE YOU BEEN KICKED, HIT, SLAPPED, OR OTHERWISE PHYSICALLY HURT BY YOUR PARTNER OR EX-PARTNER?: NO

## 2025-04-03 SDOH — SOCIAL STABILITY: SOCIAL INSECURITY
WITHIN THE LAST YEAR, HAVE YOU BEEN RAPED OR FORCED TO HAVE ANY KIND OF SEXUAL ACTIVITY BY YOUR PARTNER OR EX-PARTNER?: NO

## 2025-04-03 SDOH — SOCIAL STABILITY: SOCIAL INSECURITY: DOES ANYONE TRY TO KEEP YOU FROM HAVING/CONTACTING OTHER FRIENDS OR DOING THINGS OUTSIDE YOUR HOME?: NO

## 2025-04-03 SDOH — SOCIAL STABILITY: SOCIAL INSECURITY: DO YOU FEEL UNSAFE GOING BACK TO THE PLACE WHERE YOU ARE LIVING?: NO

## 2025-04-03 SDOH — SOCIAL STABILITY: SOCIAL INSECURITY: HAS ANYONE EVER THREATENED TO HURT YOUR FAMILY OR YOUR PETS?: NO

## 2025-04-03 SDOH — SOCIAL STABILITY: SOCIAL INSECURITY: ARE YOU OR HAVE YOU BEEN THREATENED OR ABUSED PHYSICALLY, EMOTIONALLY, OR SEXUALLY BY ANYONE?: NO

## 2025-04-03 SDOH — ECONOMIC STABILITY: INCOME INSECURITY: IN THE PAST 12 MONTHS HAS THE ELECTRIC, GAS, OIL, OR WATER COMPANY THREATENED TO SHUT OFF SERVICES IN YOUR HOME?: NO

## 2025-04-03 SDOH — ECONOMIC STABILITY: FOOD INSECURITY: WITHIN THE PAST 12 MONTHS, THE FOOD YOU BOUGHT JUST DIDN'T LAST AND YOU DIDN'T HAVE MONEY TO GET MORE.: NEVER TRUE

## 2025-04-03 SDOH — SOCIAL STABILITY: SOCIAL INSECURITY: HAVE YOU HAD ANY THOUGHTS OF HARMING ANYONE ELSE?: NO

## 2025-04-03 SDOH — SOCIAL STABILITY: SOCIAL INSECURITY: ABUSE: ADULT

## 2025-04-03 SDOH — SOCIAL STABILITY: SOCIAL INSECURITY: WITHIN THE LAST YEAR, HAVE YOU BEEN HUMILIATED OR EMOTIONALLY ABUSED IN OTHER WAYS BY YOUR PARTNER OR EX-PARTNER?: NO

## 2025-04-03 SDOH — SOCIAL STABILITY: SOCIAL INSECURITY: WERE YOU ABLE TO COMPLETE ALL THE BEHAVIORAL HEALTH SCREENINGS?: YES

## 2025-04-03 SDOH — SOCIAL STABILITY: SOCIAL INSECURITY: HAVE YOU HAD THOUGHTS OF HARMING ANYONE ELSE?: NO

## 2025-04-03 SDOH — SOCIAL STABILITY: SOCIAL INSECURITY: DO YOU FEEL ANYONE HAS EXPLOITED OR TAKEN ADVANTAGE OF YOU FINANCIALLY OR OF YOUR PERSONAL PROPERTY?: NO

## 2025-04-03 ASSESSMENT — LIFESTYLE VARIABLES
TOTAL SCORE: 0
HOW OFTEN DO YOU HAVE A DRINK CONTAINING ALCOHOL: NEVER
EVER FELT BAD OR GUILTY ABOUT YOUR DRINKING: NO
HOW MANY STANDARD DRINKS CONTAINING ALCOHOL DO YOU HAVE ON A TYPICAL DAY: PATIENT DOES NOT DRINK
SKIP TO QUESTIONS 9-10: 1
AUDIT-C TOTAL SCORE: 0
HOW OFTEN DO YOU HAVE 6 OR MORE DRINKS ON ONE OCCASION: NEVER
AUDIT-C TOTAL SCORE: 0
EVER HAD A DRINK FIRST THING IN THE MORNING TO STEADY YOUR NERVES TO GET RID OF A HANGOVER: NO
HAVE PEOPLE ANNOYED YOU BY CRITICIZING YOUR DRINKING: NO
HAVE YOU EVER FELT YOU SHOULD CUT DOWN ON YOUR DRINKING: NO

## 2025-04-03 ASSESSMENT — PATIENT HEALTH QUESTIONNAIRE - PHQ9
2. FEELING DOWN, DEPRESSED OR HOPELESS: NOT AT ALL
SUM OF ALL RESPONSES TO PHQ9 QUESTIONS 1 & 2: 0
2. FEELING DOWN, DEPRESSED OR HOPELESS: NOT AT ALL
1. LITTLE INTEREST OR PLEASURE IN DOING THINGS: NOT AT ALL
SUM OF ALL RESPONSES TO PHQ9 QUESTIONS 1 AND 2: 0
1. LITTLE INTEREST OR PLEASURE IN DOING THINGS: NOT AT ALL

## 2025-04-03 ASSESSMENT — PAIN SCALES - GENERAL
PAINLEVEL_OUTOF10: 3
PAINLEVEL_OUTOF10: 7
PAINLEVEL_OUTOF10: 7

## 2025-04-03 ASSESSMENT — PAIN DESCRIPTION - ORIENTATION: ORIENTATION: LEFT

## 2025-04-03 ASSESSMENT — COGNITIVE AND FUNCTIONAL STATUS - GENERAL
PATIENT BASELINE BEDBOUND: NO
DAILY ACTIVITIY SCORE: 24
MOBILITY SCORE: 24

## 2025-04-03 ASSESSMENT — ACTIVITIES OF DAILY LIVING (ADL)
ASSISTIVE_DEVICE: EYEGLASSES
HEARING - LEFT EAR: FUNCTIONAL
WALKS IN HOME: INDEPENDENT
GROOMING: INDEPENDENT
DRESSING YOURSELF: INDEPENDENT
JUDGMENT_ADEQUATE_SAFELY_COMPLETE_DAILY_ACTIVITIES: YES
HEARING - RIGHT EAR: FUNCTIONAL
FEEDING YOURSELF: INDEPENDENT
BATHING: INDEPENDENT
LACK_OF_TRANSPORTATION: NO
ADEQUATE_TO_COMPLETE_ADL: YES
PATIENT'S MEMORY ADEQUATE TO SAFELY COMPLETE DAILY ACTIVITIES?: YES
TOILETING: INDEPENDENT

## 2025-04-03 ASSESSMENT — ENCOUNTER SYMPTOMS
COLOR CHANGE: 1
CARDIOVASCULAR NEGATIVE: 1
COUGH: 1
HEMATOLOGIC/LYMPHATIC NEGATIVE: 1
DEPRESSION: 0
SHORTNESS OF BREATH: 1
GASTROINTESTINAL NEGATIVE: 1
MUSCULOSKELETAL NEGATIVE: 1
EYES NEGATIVE: 1
DIAPHORESIS: 0
FATIGUE: 1
ACTIVITY CHANGE: 1
FEVER: 0
UNEXPECTED WEIGHT CHANGE: 0
PSYCHIATRIC NEGATIVE: 1
CHILLS: 0
OCCASIONAL FEELINGS OF UNSTEADINESS: 0
LOSS OF SENSATION IN FEET: 0
NEUROLOGICAL NEGATIVE: 1
APPETITE CHANGE: 0

## 2025-04-03 ASSESSMENT — PAIN DESCRIPTION - DESCRIPTORS
DESCRIPTORS: SHARP;STABBING
DESCRIPTORS: SHARP;STABBING

## 2025-04-03 ASSESSMENT — PAIN - FUNCTIONAL ASSESSMENT
PAIN_FUNCTIONAL_ASSESSMENT: 0-10
PAIN_FUNCTIONAL_ASSESSMENT: 0-10

## 2025-04-03 ASSESSMENT — PAIN DESCRIPTION - LOCATION: LOCATION: FOOT

## 2025-04-03 NOTE — Clinical Note
Vessel(s): left tibio-peroneal trunk and left dorsalis pedis artery. Injected with hand injections. Single view taken.

## 2025-04-03 NOTE — Clinical Note
Vessel(s): left CFA, left SFA, left popliteal artery, left tibio-peroneal trunk and left dorsalis pedis artery. Injected with hand injections. Single view taken.

## 2025-04-03 NOTE — Clinical Note
Angioplasty of the left peroenal lesion. Inflation 1: Pressure = 8 brian; Duration = 120 sec. Inflation 2: Pressure = 8 brian; Duration = 60 sec.

## 2025-04-03 NOTE — Clinical Note
Sheath was exchanged in the right femoral artery with SHEATH, PK, W/O GUIDEWIRE, 25 CM,  6FR INTRODUCER, 5FR MADY, 2.5 CM DIALATOR.

## 2025-04-03 NOTE — PROGRESS NOTES
Connor Farr is a 59 y.o. male admitted for No Principal Problem currently listed. Pharmacy reviewed the patient's aspzl-bp-[id number] medications and allergies for accuracy.    Medications ADDED:  None  Medications CHANGED:  None  Medications REMOVED:   bacitracin 500 unit/gram ointment  benzonatate (Tessalon) 100 mg capsule  loratadine (Claritin) 10 mg tablet  metFORMIN (Glucophage) 500 mg tablet  nabumetone (Relafen) 750 mg tablet    The list below reflects the updated PTA list. Comments regarding how patient may be taking medications differently can be found in the Admit Orders Activity  Prior to Admission Medications   Prescriptions Last Dose Informant   albuterol (Proventil HFA) 90 mcg/actuation inhaler PRN Self   Sig: Inhale 2 puffs every 4 hours if needed for wheezing   or shortness of breath.   amoxicillin-pot clavulanate (Augmentin) 875-125 mg tablet 4/2/2025 Evening Self   Sig: Take 1 tablet (875 mg) by mouth 2 times a day for 10 days.   cyclobenzaprine (Flexeril) 10 mg tablet PRN Self   Sig: Take 1 tablet (10 mg) by mouth as needed at bedtime  for muscle spasms.   levothyroxine (Synthroid, Levoxyl) 75 mcg tablet 4/2/2025 Bedtime Self   Sig: Take 1 tablet (75 mcg) by mouth once daily.      losartan (Cozaar) 100 mg tablet 4/2/2025 Evening Self   Sig: Take 1 tablet (100 mg) by mouth once daily.      metoprolol succinate XL (Toprol-XL) 100 mg 24 hr tablet 4/2/2025 Evening Self   Sig: Take 1 tablet (100 mg) by mouth once daily.      omeprazole (PriLOSEC) 40 mg DR capsule 4/3/2025 Morning Self   Sig: Take 1 capsule (40 mg) by mouth once daily.   rosuvastatin (Crestor) 20 mg tablet 4/2/2025 Evening Self   Sig: Take 1 tablet (20 mg) by mouth once daily.      Facility-Administered Medications: None        The list below reflects the updated allergy list. Please review each documented allergy for additional clarification and justification.  Allergies  Reviewed by Kishor Mehta on 4/3/2025        Severity  Reactions Comments    Cephalexin Not Specified Unknown             Pharmacy has been updated to [facility], [address] [phone] re-evaluate at discharge    Sources used to complete the med history include:   Patient interview with visitor at bedside, good historian, dispense report, care everywhere, chart review history    Below are additional concerns with the patient's PTA list.  None    Kishor Mehta CPhT  Please reach out via [employer] Secure Chat for questions

## 2025-04-03 NOTE — Clinical Note
Angioplasty of the left SFA lesion. Inflation 1: Pressure = 8 brian; Duration = 30 sec. Inflation 2: Pressure = 8 brian; Duration = 30 sec.

## 2025-04-03 NOTE — PROGRESS NOTES
Subjective   Patient ID: Connor Farr is a 59 y.o. male who presents for further evaluation of PAD    HPI    This is a 59-year-old male seen by my colleague, Deepa PATTON last week after undergoing a vascular study in our office was noted to be abnormal.  He had complaints of left hallux discoloration for approximately 2 weeks at that time.  He also complained of discomfort of the toe.  He was scheduled for CT angio with runoff and follow-up with Dr. Patterson in office today however, she had to reschedule due to emergent case at [facility].  Patient's wife called our office this morning stating he had to be seen, she had concerned that all the toes on his foot were purple/black.  She also tells me he stubbed his toe this weekend and now has redness and discoloration of the foot extending up the leg.  Tells me he complains of pain but continues to work.     The patient was evaluated in the exam room with his daughter present.  He came from work.  Tells me on Sunday he stubbed his toe, the toenail later fell off.  His foot has been red for the last couple of days.  He has swelling of the foot present.  Proximal streaking to the ankle is present.  No malodor.  Gangrene of the hallux is present.  The lesser toes are slightly mottled.  He has multiphasic PT and biphasic DP on Doppler.  The foot is with increased calor      CT angio aorta and bilateral iliofemoral runoff including without contrast if performed    Result Date: 4/1/2025  Interpreted By:  Mario Hall, STUDY: CT ANGIO AORTA AND BILATERAL ILIOFEMORAL RUN OFF INCLUDING WITHOUT CONTRAST IF PERFORMED;  3/31/2025 9:36 am   INDICATION: Signs/Symptoms:PVD   COMPARISON: 08/09/2023 CT abdomen and pelvis   ACCESSION NUMBER(S): [id number]   ORDERING CLINICIAN: DEEPA ROMAN   TECHNIQUE: Thin-section axial images of the abdomen and pelvis in the arterial phase after intravenous administration of  75 mL Omnipaque 350. Sagittal and coronal reconstructions. 3D  reconstructions were obtained at a separate workstation.   FINDINGS: Vascular:  No aortic aneurysm or dissection.   The celiac trunk, superior mesenteric artery, renal arteries and inferior mesenteric artery are patent.   Right lower extremity: Right common iliac artery is widely patent. Internal iliac artery is patent. External iliac artery is occluded at its origin without reconstitution. There is reconstitution of the common femoral artery. Severe stenosis at the origin of the profundus with distal reconstitution. Superficial femoral artery is patent throughout. Popliteal artery is patent. Proximal tibial arteries are patent. The peroneal and posterior tibial arteries are patent to the ankle. The anterior tibial artery is attenuated along the distal leg.   Left lower extremity: Left common iliac artery is patent. Internal iliac artery is occluded with some degree of distal reconstitution about the distal branches. External iliac artery is patent. Common femoral artery is patent. Profundus is patent. Superficial femoral artery is occluded proximally with distal reconstitution. Popliteal artery is patent. Three-vessel tibial runoff to the ankle.   LOWER CHEST: No acute abnormality of the lung bases. BONES: No acute osseous abnormality. ABDOMINAL WALL: Fat filled right inguinal hernia.   ABDOMEN:   LIVER: Within normal limits. BILE DUCTS: Normal caliber. GALLBLADDER: No calcified gallstones. No wall thickening. PANCREAS: Within normal limits. SPLEEN: Within normal limits. ADRENALS: Within normal limits. KIDNEYS and URETERS: Symmetric renal enhancement. No hydronephrosis.   RETROPERITONEUM: No pathologically enlarged retroperitoneal lymph nodes.   PELVIS:   REPRODUCTIVE ORGANS: No pelvic masses. BLADDER: Within normal limits.   BOWEL: No dilated bowel.  Normal appendix. PERITONEUM: No ascites or free air, no fluid collection.       1. Occlusion of the right external iliac artery with right common femoral artery  reconstitution. Severe stenosis at the origin of the right profundus with distal reconstitution. Superficial femoral artery is patent. Two-vessel runoff to the ankle as above. 2. Left internal iliac artery occlusion with distal reconstitution. Left superficial femoral artery occluded proximally with distal reconstitution. Popliteal artery and distal tibial vessels patent with three-vessel runoff. 3. No acute process within the abdomen or pelvis.   MACRO: none   Signed by: Mario Hall 4/1/2025 10:31 AM Dictation workstation:   [id number]    Vascular US PVR with exercise    Result Date: 3/28/2025           [place] [id number]            Phone [phone]  Vascular Lab Report  [facility] US PVR WITH EXERCISE Patient Name:      LEIGHANN Tompkins Physician:  [id number] Rosario Draper MD Study Date:        3/28/2025           Ordering Provider:  [id number] RAFAL GUTIERRES MRN/PID:           [id number]            Fellow: Accession#:        [id number]        Technologist:       Gabi Mcfadden RVT Date of Birth/Age: 1965 / 59     Technologist 2:                    years Gender:            M                   Encounter#:         [id number] Admission Status:  Outpatient          Location Performed: [facility]  Diagnosis/ICD: Atherosclerosis of native arteries of extremities with                intermittent claudication, bilateral legs-I70.213 CPT Codes:     80515 Peripheral artery PVR with exercise  Pertinent History: Purple discoloration of all left toes.  CONCLUSIONS: Right Lower PVR: There is evidence of mild multi vessel disease. Hemodynamics are further compromised in the right lower extremity with exercise. Decreased digital perfusion noted. Monophasic flow is noted in the right common femoral artery, right popliteal artery, right posterior tibial artery and right dorsalis pedis artery. Left Lower PVR: There  is evidence of mild multi-vessel disease. Hemodynamics are further compromised in the left lower extremity with exercise. Decreased digital perfusion noted. Monophasic flow is noted in the left common femoral artery, left popliteal artery and left dorsalis pedis artery. Left posterior tibial artery is not audible. Exercise:Exercise completed at 10% grade and 2 miles per hour for 2 minutes. The patient experienced pain in the right calf and the left calf at 1.5 minutes. The exam was terminated due to extremity pain.  Imaging & Doppler Findings:  RIGHT Lower PVR                Pressures Ratios Right High Thigh               142 mmHg  1.10 Right Low Thigh                125 mmHg  0.97 Right Calf                     104 mmHg  0.81 Right Posterior Tibial (Ankle) 105 mmHg  0.81 Right Dorsalis Pedis (Ankle)   109 mmHg  0.84 Right Digit (Great Toe)        62 mmHg   0.48  Right Ankle Post Exercise      64 mmHg   0.40 5 Minute Pressure              90 mmHg   0.64  LEFT Lower PVR              Pressures Ratios Left High Thigh             135 mmHg  1.05 Left Low Thigh              111 mmHg  0.86 Left Calf                   96 mmHg   0.74 Left Dorsalis Pedis (Ankle) 91 mmHg   0.71 Left Digit (Great Toe)      41 mmHg   0.32  Left Ankle Post Exercise    53 mmHg   0.33 5 Minute Pressure           54 mmHg   0.38                     Right     Left Brachial Pressure 121 mmHg 129 mmHg   [id number] Rosario Draper MD Electronically signed by [id number] Rosario Draper MD on 3/28/2025 at 12:09:30 PM  ** Final **        Review of Systems  As noted in the HPI    Objective   Physical Exam  Constitutional:  Alert and oriented to person, place, date/time in no acute distress.  HEENT:  Atraumatic, normocephalic. PERRL. EOMI.  Nares patent.  Mucous membranes moist.  .   Neck:  Trachea midline.  Respiratory:  Clear to auscultation.  Cardiac:  Regular rate and rhythm.  No murmurs.  Cardiovascular:  No edema of the extremities. Pulse exam: Right femoral pulse  not palpable.  Left femoral pulse palpable.  Left PT multiphasic, DP biphasic.  Abdominal:  Soft, nontender, nondistended, bowel sounds present.  Musculoskeletal:  Moves extremities freely.  Left foot tenderness with palpation.  Dermatological: Left hallux with gangrene, somewhat wet appearing, no malodor, erythema present of the left foot with proximal streaking to the ankle.  Extremely tender upon palpation.  Increased calor present  Neurological: Alert and oriented to person, place, date/time  Psych:  Calm, cooperative      Assessment/Plan     PAD    59-year-old male with left hallux discoloration for approximately 3 weeks.  Underwent vascular study in office on 3/28 with a left YONG of 0.7 dropping to 0.3 with exercise and right 0.8 dropping to 0.4 with exercise.  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 Severe stenosis at the origin of the right profundus with distal reconstitution. Superficial femoral artery is patent. Two-vessel runoff to the ankle as above. 2. Left internal iliac artery occlusion with distal reconstitution. Left superficial femoral artery occluded proximally with distal reconstitution. Popliteal artery and distal tibial vessels patent with three-vessel runoff.    Patient with left foot infection, stubbed his toe on Sunday, lost his toenail Sunday evening, foot has gotten progressively worse.  Advised he present to [facility] emergency department for admission under primary.  IV antibiotics, cultures, podiatry and ID consult.  Our service on consult as well, likely plan for vascular intervention once infection has cleared.  No further vascular imaging is warranted during the admission.         Mehnaz Faith, SHAYLA-CNP 04/03/25 2:28 PM

## 2025-04-03 NOTE — Clinical Note
Sheath was exchanged with SHEATH, SpanDeXJOHN, W/.038 GUIDEWIRE, 10 CM,  6FR INTRODUCER, 6FR MADY, 2.5 CM DIALATOR.

## 2025-04-03 NOTE — Clinical Note
Balloon removed. I reviewed the H+P performed by Emmett Balderas PA-C and have performed a separate history and physical examination today. There are no updates or changes. Plan to proceed with circumcision with correction of chordee, umbilical skin tag resection, and possible scrotoplasty. R/B/A and P discussed and family has consented to proceed.

## 2025-04-03 NOTE — Clinical Note
Angioplasty of the Left SFA lesion. Inflation 1: Pressure = 8 brian; Duration = 30 sec. Inflation 2: Pressure = 6 brian; Duration = 30 sec. Inflation 3: Pressure = 8 brian; Duration = 30 sec. No

## 2025-04-03 NOTE — Clinical Note
Sheath was exchanged in the left femoral artery with SHEATH, 45CM W/DILATOR, 6FR ST STYLE W/CROSS-CUT VALVE.

## 2025-04-03 NOTE — ED NOTES
Pt in [facility], updated on waiting for room upstairs      Ailyn Payne, OPAL  04/03/25 [id number]

## 2025-04-04 ENCOUNTER — APPOINTMENT (OUTPATIENT)
Dept: RADIOLOGY | Facility: HOSPITAL | Age: 60
DRG: 271 | End: 2025-04-04
Payer: COMMERCIAL

## 2025-04-04 PROBLEM — I73.9 PAD (PERIPHERAL ARTERY DISEASE) (CMS-HCC): Status: ACTIVE | Noted: 2025-04-03

## 2025-04-04 LAB
APPEARANCE UR: CLEAR
BILIRUB UR STRIP.AUTO-MCNC: NEGATIVE MG/DL
COLOR UR: NORMAL
GLUCOSE UR STRIP.AUTO-MCNC: NORMAL MG/DL
HOLD SPECIMEN: NORMAL
HOLD SPECIMEN: NORMAL
KETONES UR STRIP.AUTO-MCNC: NEGATIVE MG/DL
LEUKOCYTE ESTERASE UR QL STRIP.AUTO: NEGATIVE
NITRITE UR QL STRIP.AUTO: NEGATIVE
PH UR STRIP.AUTO: 5.5 [PH]
PROT UR STRIP.AUTO-MCNC: NEGATIVE MG/DL
RBC # UR STRIP.AUTO: NEGATIVE MG/DL
SP GR UR STRIP.AUTO: 1.02
UROBILINOGEN UR STRIP.AUTO-MCNC: NORMAL MG/DL
VANCOMYCIN SERPL-MCNC: 20.2 UG/ML (ref 5–20)
VANCOMYCIN SERPL-MCNC: 28.1 UG/ML (ref 5–20)

## 2025-04-04 PROCEDURE — 2500000002 HC RX 250 W HCPCS SELF ADMINISTERED DRUGS (ALT 637 FOR MEDICARE OP, ALT 636 FOR OP/ED): Performed by: NURSE PRACTITIONER

## 2025-04-04 PROCEDURE — 2500000004 HC RX 250 GENERAL PHARMACY W/ HCPCS (ALT 636 FOR OP/ED): Performed by: INTERNAL MEDICINE

## 2025-04-04 PROCEDURE — 36415 COLL VENOUS BLD VENIPUNCTURE: CPT | Performed by: INTERNAL MEDICINE

## 2025-04-04 PROCEDURE — 2500000001 HC RX 250 WO HCPCS SELF ADMINISTERED DRUGS (ALT 637 FOR MEDICARE OP): Performed by: INTERNAL MEDICINE

## 2025-04-04 PROCEDURE — A9698 NON-RAD CONTRAST MATERIALNOC: HCPCS | Performed by: INTERNAL MEDICINE

## 2025-04-04 PROCEDURE — 2500000002 HC RX 250 W HCPCS SELF ADMINISTERED DRUGS (ALT 637 FOR MEDICARE OP, ALT 636 FOR OP/ED): Performed by: INTERNAL MEDICINE

## 2025-04-04 PROCEDURE — S4991 NICOTINE PATCH NONLEGEND: HCPCS | Performed by: INTERNAL MEDICINE

## 2025-04-04 PROCEDURE — 80202 ASSAY OF VANCOMYCIN: CPT | Performed by: INTERNAL MEDICINE

## 2025-04-04 PROCEDURE — 99223 1ST HOSP IP/OBS HIGH 75: CPT | Performed by: NURSE PRACTITIONER

## 2025-04-04 PROCEDURE — 74176 CT ABD & PELVIS W/O CONTRAST: CPT

## 2025-04-04 PROCEDURE — 2500000001 HC RX 250 WO HCPCS SELF ADMINISTERED DRUGS (ALT 637 FOR MEDICARE OP): Performed by: NURSE PRACTITIONER

## 2025-04-04 PROCEDURE — 1210000001 HC SEMI-PRIVATE ROOM DAILY

## 2025-04-04 PROCEDURE — 81003 URINALYSIS AUTO W/O SCOPE: CPT

## 2025-04-04 PROCEDURE — 2550000001 HC RX 255 CONTRASTS: Performed by: INTERNAL MEDICINE

## 2025-04-04 PROCEDURE — 74176 CT ABD & PELVIS W/O CONTRAST: CPT | Performed by: STUDENT IN AN ORGANIZED HEALTH CARE EDUCATION/TRAINING PROGRAM

## 2025-04-04 RX ORDER — IBUPROFEN 200 MG
1 TABLET ORAL DAILY
Status: DISPENSED | OUTPATIENT
Start: 2025-04-04

## 2025-04-04 RX ORDER — ASPIRIN 81 MG/1
81 TABLET ORAL DAILY
Status: DISPENSED | OUTPATIENT
Start: 2025-04-04

## 2025-04-04 RX ADMIN — PIPERACILLIN SODIUM AND TAZOBACTAM SODIUM 4.5 G: 4; .5 INJECTION, SOLUTION INTRAVENOUS at 00:49

## 2025-04-04 RX ADMIN — PIPERACILLIN SODIUM AND TAZOBACTAM SODIUM 4.5 G: 4; .5 INJECTION, SOLUTION INTRAVENOUS at 16:40

## 2025-04-04 RX ADMIN — VANCOMYCIN 1250 MG: 1.75 INJECTION, SOLUTION INTRAVENOUS at 20:16

## 2025-04-04 RX ADMIN — ASPIRIN 81 MG: 81 TABLET, COATED ORAL at 11:49

## 2025-04-04 RX ADMIN — PANTOPRAZOLE SODIUM 20 MG: 20 TABLET, DELAYED RELEASE ORAL at 06:01

## 2025-04-04 RX ADMIN — PIPERACILLIN SODIUM AND TAZOBACTAM SODIUM 4.5 G: 4; .5 INJECTION, SOLUTION INTRAVENOUS at 11:49

## 2025-04-04 RX ADMIN — VANCOMYCIN 1250 MG: 1.75 INJECTION, SOLUTION INTRAVENOUS at 04:30

## 2025-04-04 RX ADMIN — IOHEXOL 500 ML: 12 SOLUTION ORAL at 18:39

## 2025-04-04 RX ADMIN — LOSARTAN POTASSIUM 100 MG: 100 TABLET, FILM COATED ORAL at 20:16

## 2025-04-04 RX ADMIN — NICOTINE 1 PATCH: 14 PATCH, EXTENDED RELEASE TRANSDERMAL at 16:56

## 2025-04-04 RX ADMIN — ROSUVASTATIN CALCIUM 20 MG: 20 TABLET, FILM COATED ORAL at 20:16

## 2025-04-04 RX ADMIN — PIPERACILLIN SODIUM AND TAZOBACTAM SODIUM 4.5 G: 4; .5 INJECTION, SOLUTION INTRAVENOUS at 06:04

## 2025-04-04 RX ADMIN — METOPROLOL SUCCINATE 100 MG: 100 TABLET, EXTENDED RELEASE ORAL at 20:16

## 2025-04-04 RX ADMIN — LEVOTHYROXINE SODIUM 75 MCG: 0.07 TABLET ORAL at 20:16

## 2025-04-04 RX ADMIN — OXYCODONE HYDROCHLORIDE AND ACETAMINOPHEN 1 TABLET: 5; 325 TABLET ORAL at 20:20

## 2025-04-04 ASSESSMENT — PAIN - FUNCTIONAL ASSESSMENT
PAIN_FUNCTIONAL_ASSESSMENT: 0-10
PAIN_FUNCTIONAL_ASSESSMENT: 0-10

## 2025-04-04 ASSESSMENT — PAIN DESCRIPTION - DESCRIPTORS
DESCRIPTORS: SHARP
DESCRIPTORS: SHARP

## 2025-04-04 ASSESSMENT — PAIN SCALES - GENERAL
PAINLEVEL_OUTOF10: 7
PAINLEVEL_OUTOF10: 0 - NO PAIN
PAINLEVEL_OUTOF10: 2

## 2025-04-04 ASSESSMENT — PAIN DESCRIPTION - LOCATION: LOCATION: FOOT

## 2025-04-04 ASSESSMENT — COGNITIVE AND FUNCTIONAL STATUS - GENERAL
MOBILITY SCORE: 24
DAILY ACTIVITIY SCORE: 24

## 2025-04-04 ASSESSMENT — PAIN DESCRIPTION - ORIENTATION: ORIENTATION: LEFT

## 2025-04-04 ASSESSMENT — ENCOUNTER SYMPTOMS
WOUND: 1
DIARRHEA: 0
NAUSEA: 0
COLOR CHANGE: 1
CONSTIPATION: 0
ABDOMINAL DISTENTION: 0
VOMITING: 0

## 2025-04-04 NOTE — H&P (VIEW-ONLY)
Reason for Consult   Left foot sub acute ischemia, left foot cellulitis    History Of Present Illness    This is a 59-year-old male seen by my colleague, Stephenie PATTON last week after undergoing a vascular study in our office was noted to be abnormal. He had complaints of left hallux discoloration for approximately 2 weeks at that time. He also complained of discomfort of the toe. He was scheduled for CT angio with runoff and follow-up with Dr. Patterson in office today however, she had to reschedule due to emergent case at [facility].  He was seen in office by myself yesterday with his daughter present.  He had stubbed his toe on Sunday, the toenail fell off that evening and he subsequently developed erythema of the foot which has gotten progressively worse over the last couple of days.  He has left foot swelling as well.  Proximal streaking to the ankle is present.  He has gangrene of the hallux present.  Extremely tender to palpation.  The lesser toes are slightly mottled.  He was advised to present to the emergency department yesterday.  Our service is on consult for further workup of his left limb ischemia.  He is currently on IV antibiotics and infectious disease on consult as well.  Cultures are pending.  Podiatry on consult.  White count 12,000    Of note, patient's left foot erythema with substantial improvement.  His right femoral pulse nonpalpable.  Left palpable.  He has biphasic left DP and Doppler.     Past Medical History  Past Medical History:   Diagnosis Date    Acute sinusitis, unspecified 08/29/2013    Acute sinusitis    Body mass index (BMI) 27.0-27.9, adult     BMI 27.0-27.9,adult    Fatty liver     High cholesterol     Hypertension     Personal history of other diseases of the respiratory system 11/26/2013    History of acute bronchitis    Personal history of other specified conditions 08/01/2013    History of abdominal pain         Surgical History  History reviewed. No pertinent surgical  history.       Social History  Social History     Socioeconomic History    Marital status:      Spouse name: Not on file    Number of children: 3    Years of education: Not on file    Highest education level: Not on file   Occupational History    Not on file   Tobacco Use    Smoking status: Every Day     Current packs/day: 0.50     Types: Cigarettes    Smokeless tobacco: Never   Vaping Use    Vaping status: Never Used   Substance and Sexual Activity    Alcohol use: Never     Comment: Social alcohol use    Drug use: Never    Sexual activity: Defer   Other Topics Concern    Not on file   Social History Narrative    Not on file     Social Drivers of Health     Financial Resource Strain: Low Risk  (4/3/2025)    Overall Financial Resource Strain (CARDIA)     Difficulty of Paying Living Expenses: Not hard at all   Food Insecurity: No Food Insecurity (4/3/2025)    Hunger Vital Sign     Worried About Running Out of Food in the Last Year: Never true     Ran Out of Food in the Last Year: Never true   Transportation Needs: No Transportation Needs (4/3/2025)    PRAPARE - Transportation     Lack of Transportation (Medical): No     Lack of Transportation (Non-Medical): No   Physical Activity: Not on file   Stress: Not on file   Social Connections: Not on file   Intimate Partner Violence: Not At Risk (4/3/2025)    Humiliation, Afraid, Rape, and Kick questionnaire     Fear of Current or Ex-Partner: No     Emotionally Abused: No     Physically Abused: No     Sexually Abused: No   Housing Stability: Low Risk  (4/3/2025)    Housing Stability Vital Sign     Unable to Pay for Housing in the Last Year: No     Number of Times Moved in the Last Year: 0     Homeless in the Last Year: No         Family History  Family History   Problem Relation Name Age of Onset    Other (MTHFR mutation) Sister      No Known Problems Daughter      No Known Problems Daughter      No Known Problems Son      Hypertension Other FHx         essential     Heart disease Other FHx     Diabetes type II Other FHx           Allergies  Allergies   Allergen Reactions    Cephalexin Unknown         Relevant Results  Results for orders placed or performed during the hospital encounter of 04/03/25 (from the past 24 hours)   CBC and Auto Differential   Result Value Ref Range    WBC 12.0 (H) 4.4 - 11.3 x10*3/uL    nRBC 0.0 0.0 - 0.0 /100 WBCs    RBC 4.51 4.50 - 5.90 x10*6/uL    Hemoglobin 14.4 13.5 - 17.5 g/dL    Hematocrit 42.3 41.0 - 52.0 %    MCV 94 80 - 100 fL    MCH 31.9 26.0 - 34.0 pg    MCHC 34.0 32.0 - 36.0 g/dL    RDW 13.3 11.5 - 14.5 %    Platelets 232 150 - 450 x10*3/uL    Neutrophils % 74.2 40.0 - 80.0 %    Immature Granulocytes %, Automated 0.4 0.0 - 0.9 %    Lymphocytes % 16.9 13.0 - 44.0 %    Monocytes % 7.2 2.0 - 10.0 %    Eosinophils % 0.8 0.0 - 6.0 %    Basophils % 0.5 0.0 - 2.0 %    Neutrophils Absolute 8.88 (H) 1.20 - 7.70 x10*3/uL    Immature Granulocytes Absolute, Automated 0.05 0.00 - 0.70 x10*3/uL    Lymphocytes Absolute 2.02 1.20 - 4.80 x10*3/uL    Monocytes Absolute 0.86 0.10 - 1.00 x10*3/uL    Eosinophils Absolute 0.09 0.00 - 0.70 x10*3/uL    Basophils Absolute 0.06 0.00 - 0.10 x10*3/uL   Comprehensive Metabolic Panel   Result Value Ref Range    Glucose 100 (H) 74 - 99 mg/dL    Sodium 134 (L) 136 - 145 mmol/L    Potassium 3.9 3.5 - 5.3 mmol/L    Chloride 102 98 - 107 mmol/L    Bicarbonate 25 21 - 32 mmol/L    Anion Gap 11 10 - 20 mmol/L    Urea Nitrogen 7 6 - 23 mg/dL    Creatinine 0.65 0.50 - 1.30 mg/dL    eGFR >90 >60 mL/min/1.73m*2    Calcium 9.0 8.6 - 10.3 mg/dL    Albumin 3.9 3.4 - 5.0 g/dL    Alkaline Phosphatase 69 33 - 120 U/L    Total Protein 7.0 6.4 - 8.2 g/dL    AST 20 9 - 39 U/L    Bilirubin, Total 0.4 0.0 - 1.2 mg/dL    ALT 19 10 - 52 U/L   Lactate   Result Value Ref Range    Lactate 1.6 0.4 - 2.0 mmol/L   Blood Culture    Specimen: Peripheral Venipuncture; Blood culture   Result Value Ref Range    Blood Culture Loaded on Instrument -  Culture in progress    Blood Culture    Specimen: Peripheral Venipuncture; Blood culture   Result Value Ref Range    Blood Culture Loaded on Instrument - Culture in progress    Sedimentation rate, automated   Result Value Ref Range    Sedimentation Rate 28 (H) 0 - 20 mm/h   C-reactive protein   Result Value Ref Range    C-Reactive Protein 2.50 (H) <1.00 mg/dL   Urinalysis with Reflex Culture and Microscopic   Result Value Ref Range    Color, Urine Light-Yellow Light-Yellow, Yellow, Dark-Yellow    Appearance, Urine Clear Clear    Specific Gravity, Urine 1.025 1.005 - 1.035    pH, Urine 5.5 5.0, 5.5, 6.0, 6.5, 7.0, 7.5, 8.0    Protein, Urine NEGATIVE NEGATIVE, 10 (TRACE), 20 (TRACE) mg/dL    Glucose, Urine Normal Normal mg/dL    Blood, Urine NEGATIVE NEGATIVE mg/dL    Ketones, Urine NEGATIVE NEGATIVE mg/dL    Bilirubin, Urine NEGATIVE NEGATIVE mg/dL    Urobilinogen, Urine Normal Normal mg/dL    Nitrite, Urine NEGATIVE NEGATIVE    Leukocyte Esterase, Urine NEGATIVE NEGATIVE   Vancomycin   Result Value Ref Range    Vancomycin 28.1 (H) 5.0 - 20.0 ug/mL   Vancomycin   Result Value Ref Range    Vancomycin 20.2 (H) 5.0 - 20.0 ug/mL     *Note: Due to a large number of results and/or encounters for the requested time period, some results have not been displayed. A complete set of results can be found in Results Review.     Imaging  XR chest 2 views    Result Date: 4/4/2025  No acute cardiopulmonary disease.   Signed by: Marisela Dempsey 4/4/2025 9:01 AM Dictation workstation:   [id number]    XR foot left 3+ views    Result Date: 4/3/2025  On oblique view, there is a small area of cortical destruction suggested along the medial aspect of the tuft of the distal phalanx of the great toe suggesting a small area of osteomyelitis at this site.   DJD of the 1st MTP joint.   Plantar and posterior calcaneal spurs.   Signed by: Marisela Dempsey 4/3/2025 5:07 PM Dictation workstation:   [id number]     Cardiology, Vascular, and Other Imaging  No  other imaging results found for the past 2 days      Physical Exam  Constitutional:  Alert and oriented to person, place, date/time in no acute distress.  HEENT:  Atraumatic, normocephalic. PERRL. EOMI.  Nares patent.  Mucous membranes moist.  .   Neck:  Trachea midline.  Respiratory:  Clear to auscultation.  Cardiac:  Regular rate and rhythm.  No murmurs.  Cardiovascular: +1 edema of the left leg.  Pulse exam: Right femoral pulse not palpable.  Left femoral pulse palpable.  Left PT multiphasic, DP biphasic.  Abdominal:  Soft, nontender, nondistended, bowel sounds present.  Musculoskeletal:  Moves extremities freely.  Left foot tenderness with palpation.  Dermatological: Left hallux with gangrene, dry, no malodor, resolving erythema present of the left foot.   Extremely tender upon palpation.  Increased calor present  Neurological: Alert and oriented to person, place, date/time  Psych:  Calm, cooperative           Assessment/Plan  PAD -left limb subacute limb ischemia  Left foot cellulitis  Left foot hallux gangrene     59-year-old male with left hallux discoloration for approximately 3 weeks.  Underwent vascular study in office on 3/28 with a left YONG of 0.7 dropping to 0.3 with exercise and right 0.8 dropping to 0.4 with exercise.  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 Severe stenosis at the origin of the right profundus with distal reconstitution. Superficial femoral artery is patent. Two-vessel runoff to the ankle as above. 2. Left internal iliac artery occlusion with distal reconstitution. Left superficial femoral artery occluded proximally with distal reconstitution. Popliteal artery and distal tibial vessels patent with three-vessel runoff.    Patient admitted under primary for left foot infection  - X-rays with concern of early osteomyelitis of the tuft of the distal phalanx  -Podiatry following, we recommend  local wound care unless patient becomes septic or there is concern for wet gangrene  - ID on consult, on IV antibiotics  - Cultures pending  - On statin  Plan for aortogram with bilateral iliac intervention on Monday, April 7 by Dr. Oro at 8 AM.  Will follow cultures  -Start aspirin 81 mg daily  -Case request placed  - Make n.p.o. on Sunday evening  -Will need Plavix added after procedure

## 2025-04-04 NOTE — CONSULTS
Vancomycin Dosing by Pharmacy- INITIAL    Connor Farr is a 59 y.o. year old male who Pharmacy has been consulted for vancomycin dosing for osteomyelitis/septic arthritis. Based on the patient's indication and renal status this patient will be dosed based on a goal AUC of 400-600.     Renal function is currently stable.    Visit Vitals  /80   Pulse 84   Temp 36.4 °C (97.5 °F) (Temporal)   Resp 18        Lab Results   Component Value Date    CREATININE 0.65 2025    CREATININE 0.86 2025    CREATININE 0.94 2024    CREATININE 0.88 2024        Patient weight is as follows:   Vitals:    25 1630   Weight: 109 kg (241 lb)       Cultures:  No results found for the encounter in last 14 days.        No intake/output data recorded.  I/O during current shift:  No intake/output data recorded.    Temp (24hrs), Av.4 °C (97.5 °F), Min:36.4 °C (97.5 °F), Max:36.4 °C (97.5 °F)         Assessment/Plan     Patient has already been given a loading dose of 2000 mg on 25 at 1731.  Will initiate vancomycin maintenance,  1250 mg every 24 hours beginning on 25 at 0200.    This dosing regimen is predicted by [employer] to result in the following pharmacokinetic parameters:    Loading dose: N/A  Regimen: 1250 mg IV every 12 hours.  Start time: 05:31 on 2025  Exposure target: AUC24 (range)400-600 mg/L.hr   MFQ09-94: 429 mg/L.hr  AUC24,ss: 460 mg/L.hr  Probability of AUC24 > 400: 65 %  Ctrough,ss: 14.3 mg/L  Probability of Ctrough,ss > 20: 24 %    Follow-up level will be ordered on 25 at AM lab draw unless clinically indicated sooner.  Will continue to monitor renal function daily while on vancomycin and order serum creatinine at least every 48 hours if not already ordered.  Follow for continued vancomycin needs, clinical response, and signs/symptoms of toxicity.       Jeff Fernandez, EmilD

## 2025-04-04 NOTE — PROGRESS NOTES
Vancomycin Dosing by Pharmacy- FOLLOW UP    Connor Farr is a 59 y.o. year old male who Pharmacy has been consulted for vancomycin dosing for osteomyelitis/septic arthritis. Based on the patient's indication and renal status this patient is being dosed based on a goal AUC of 400-600.     Renal function is currently stable.    Current vancomycin dose: 1250 mg given every 12 hours    Estimated vancomycin AUC on current dose: 480 mg/L.hr     Visit Vitals  /64 (BP Location: Left arm, Patient Position: Lying)   Pulse 64   Temp 36.3 °C (97.3 °F) (Oral)   Resp 18        Lab Results   Component Value Date    CREATININE 0.65 2025    CREATININE 0.86 2025    CREATININE 0.94 2024    CREATININE 0.88 2024        Patient weight is as follows:   Vitals:    25 0301   Weight: 108 kg (238 lb 12.1 oz)       Cultures:  No results found for the encounter in last 14 days.       I/O last 3 completed shifts:  In: - (0 mL/kg)   Out: 300 (2.8 mL/kg) [Urine:300 (0.1 mL/kg/hr)]  Weight: 108.3 kg   I/O during current shift:  I/O this shift:  In: 180 [P.O.:180]  Out: 275 [Urine:275]    Temp (24hrs), Av.4 °C (97.5 °F), Min:36 °C (96.8 °F), Max:36.9 °C (98.4 °F)      Assessment/Plan    Within goal AUC range. Continue current vancomycin regimen.    This dosing regimen is predicted by [employer] to result in the following pharmacokinetic parameters:  Loading dose: N/A  Regimen: 1250 mg IV every 12 hours.  Start time: 16:30 on 2025  Exposure target: AUC24 (range)400-600 mg/L.hr   ODA82-91: 464 mg/L.hr  AUC24,ss: 480 mg/L.hr  Probability of AUC24 > 400: 78 %  Ctrough,ss: 14.5 mg/L  Probability of Ctrough,ss > 20: 18 %      The next level will be obtained on  with am labs. May be obtained sooner if clinically indicated.   Will continue to monitor renal function daily while on vancomycin and order serum creatinine at least every 48 hours if not already ordered.  Follow for continued vancomycin needs,  clinical response, and signs/symptoms of toxicity.       ROSANNE POSADA, PharmD

## 2025-04-04 NOTE — PROGRESS NOTES
While care transitions has not been consulted pt is being followed for possible wound care and/or IV abx needs at [facility].      04/04/25 7500   Discharge Planning   Expected Discharge Disposition Home

## 2025-04-04 NOTE — PROGRESS NOTES
Connor Farr is a 59 y.o. male on day 1 of admission presenting with Osteomyelitis of left foot (Multi).      Subjective   Left big toe celullitis/osteomyelitis?       Objective     Last Recorded Vitals  /81 (BP Location: Right arm, Patient Position: Lying)   Pulse 69   Temp 36.7 °C (98.1 °F) (Oral)   Resp 16   Wt 108 kg (238 lb 12.1 oz)   SpO2 97%   Intake/Output last 3 Shifts:    Intake/Output Summary (Last 24 hours) at 4/4/2025 1645  Last data filed at 4/4/2025 1309  Gross per 24 hour   Intake 380 ml   Output 575 ml   Net -195 ml       Admission Weight  Weight: 109 kg (241 lb) (04/03/25 1630)    Daily Weight  04/04/25 : 108 kg (238 lb 12.1 oz)    Image Results  XR chest 2 views  Narrative: Interpreted By:  Marisela Dempsey,   STUDY:  XR CHEST 2 VIEWS 4/3/2025 9:02 pm      INDICATION:  Signs/Symptoms:shortness of breath, cough x6 weeks      COMPARISON:  03/03/2025      ACCESSION NUMBER(S):  [id number]      ORDERING CLINICIAN:  GURDEEP ANTHONY      TECHNIQUE:  AP erect and lateral views of the chest were acquired.      FINDINGS:  Normal heart, mediastinum, and lungs. A small amount of calcified  plaque within the aortic arch is seen. There are degenerative changes  of the thoracic spine.      Impression: No acute cardiopulmonary disease.      Signed by: Marisela Dempsey 4/4/2025 9:01 AM  Dictation workstation:   [id number]      Physical Exam  Constitutional:       Appearance: Normal appearance. He is obese.   HENT:      Head: Normocephalic and atraumatic.      Nose: Nose normal.      Mouth/Throat:      Mouth: Mucous membranes are moist.   Eyes:      Extraocular Movements: Extraocular movements intact.      Conjunctiva/sclera: Conjunctivae normal.      Pupils: Pupils are equal, round, and reactive to light.   Cardiovascular:      Rate and Rhythm: Normal rate and regular rhythm.   Pulmonary:      Effort: Pulmonary effort is normal.      Breath sounds: Rhonchi present.   Abdominal:      General: Bowel sounds are  normal.      Palpations: Abdomen is soft.   Musculoskeletal:      Comments: Left big toe with celullitis necrosis   Neurological:      General: No focal deficit present.      Mental Status: He is alert and oriented to person, place, and time.   Psychiatric:         Mood and Affect: Mood normal.         Relevant Results               Assessment/Plan                  Assessment & Plan  Osteomyelitis of left foot (Multi)  -worsening erythema, edema and pain over last few weeks despite augmentin  -admitted from vascular office today  -XR suggesting small area of osteomyelitis in L great toe    Plan:  -control pain: prn toradol (5 days max) and oxycodone as well as dilaudid available for breakthrough pain. Will need to continue to evaluate pain management plan  -empiric abx with vancomycin and zosyn. ID consulted  -podiatry and vascular surgery consult      Cough  -x6 weeks  -rhonchi BUL on exam  -CXR  PAD (peripheral artery disease) (CMS-HCC)    Left big toe necrosis, check HGBA1C, smoker advised to quit              Katiuska Esparza MD

## 2025-04-04 NOTE — ED PROVIDER NOTES
This patient was seen by the advanced practice provider.  I have personally performed a substantive portion of the encounter.      I have seen and examined the patient; agree with the workup, evaluation, MDM, management and diagnosis.  The care plan has been discussed.       I personally saw the patient and made/approved the management plan and take responsibility for the patient management.       HPI:  59-year-old male with a history of peripheral vascular disease presents with a necrotic left great toe.  Patient had progression of the symptoms but he injured it couple days ago and now it is more black.    Physical exam:  General:  Awake, alert, no acute distress.  Head: Normocephalic, Atraumatic  Neck: Supple, trachea midline, no stridor  Skin: Warm and dry, no rashes   Lungs:  No acute respiratory distress, speaking in full sentences without difficulty  Neuro:  No gross focal neurologic deficits, NIH is 0  Musculoskeletal: Necrotic left distal great toe  Psychiatric:  Alert oriented x 3, Good insight into condition.    MDM:  Patient's left toe x-ray shows osteomyelitis.  He is treated Zosyn and vancomycin.  He requires admission to the hospital.  Contacted Dr. Olea for admission.     Wayne Wise DO  04/04/25 5995

## 2025-04-04 NOTE — CONSULTS
Inpatient consult to Infectious Diseases  Consult performed by: Carolynn Escobar APRN-CNP  Consult ordered by: Anette Goyal, SHAYLA-CNP  Reason for consult: necrotic big toe, osteomyelitis            Primary MD: Roseanna Esqueda MD      History Of Present Illness  Connor Farr is a 59 y.o. male presenting with left foot infection.  About 3 weeks ago he noticed his left toes were discolored.  Over the weekend he stubbed his toe and the toe nail fell off.  He then developed erythema of the left foot and developed gangrene to his left hallux.  He was admitted to the hospital for further evaluation and management.  He reports moderate intermittent shooting pain to the left foot/leg which was improved today compared to yesterday.  He is on empiric IV antibiotics with zosyn and vancomycin.  He is afebrile, denies fever or chills.  Left hallux with gangrene, left foot with erythema.  Reports LLQ mild abdominal soreness that worsens with movement. Reports normal bowel movements, denies any nausea or vomiting.  CTA with runoff on 4/1/25 showed occlusion of the right external iliac artery with right common femoral artery reconstitution, severe stenosis at the origin of the right profundus with distal reconstitution, superficial femoral artery is patent, two-vessel runoff to the ankle as above, left internal iliac artery occlusion with distal reconstitution, left superficial femoral artery occluded proximally with distal reconstitution, popliteal artery and distal tibial vessels patent with three-vessel runoff, also showed no acute process within the abdomen or pelvis    Past Medical History  He has a past medical history of Acute sinusitis, unspecified (08/29/2013), Body mass index (BMI) 27.0-27.9, adult, Fatty liver, High cholesterol, Hypertension, Personal history of other diseases of the respiratory system (11/26/2013), and Personal history of other specified conditions (08/01/2013).    Surgical History  He has no past  surgical history on file.     Social History     Occupational History    Not on file   Tobacco Use    Smoking status: Every Day     Current packs/day: 0.50     Types: Cigarettes    Smokeless tobacco: Never   Vaping Use    Vaping status: Never Used   Substance and Sexual Activity    Alcohol use: Never     Comment: Social alcohol use    Drug use: Never    Sexual activity: Defer     Travel History   Travel since 26    No documented travel since 26                Family History  Family History   Problem Relation Name Age of Onset    Other (MTHFR mutation) Sister      No Known Problems Daughter      No Known Problems Daughter      No Known Problems Son      Hypertension Other FHx         essential    Heart disease Other FHx     Diabetes type II Other FHx      Allergies  Cephalexin     Immunization History   Administered Date(s) Administered    Tdap vaccine, age 7 year and older (BOOSTRIX, ADACEL) 2012     Medications  Home medications:  Medications Prior to Admission   Medication Sig Dispense Refill Last Dose/Taking    albuterol (Proventil HFA) 90 mcg/actuation inhaler Inhale 2 puffs every 4 hours if needed for wheezing or shortness of breath. 6.7 g 0 Unknown    [] amoxicillin-pot clavulanate (Augmentin) 875-125 mg tablet Take 1 tablet (875 mg) by mouth 2 times a day for 10 days. 20 tablet 0 2025 Evening    cyclobenzaprine (Flexeril) 10 mg tablet Take 1 tablet (10 mg) by mouth as needed at bedtime for muscle spasms. 30 tablet 2 Unknown    levothyroxine (Synthroid, Levoxyl) 75 mcg tablet Take 1 tablet (75 mcg) by mouth once daily. 90 tablet 0 2025 Bedtime    losartan (Cozaar) 100 mg tablet Take 1 tablet (100 mg) by mouth once daily. 30 tablet 5 2025 Evening    metoprolol succinate XL (Toprol-XL) 100 mg 24 hr tablet Take 1 tablet (100 mg) by mouth once daily. 90 tablet 3 2025 Evening    omeprazole (PriLOSEC) 40 mg DR capsule Take 1 capsule (40 mg) by mouth once daily. 90 capsule 0  4/3/2025 Morning    rosuvastatin (Crestor) 20 mg tablet Take 1 tablet (20 mg) by mouth once daily. 90 tablet 3 2025 Evening    bacitracin 500 unit/gram ointment Apply topically 2 times a day. (Patient not taking: Reported on 4/3/2025) 60 g 1 Unknown    [] benzonatate (Tessalon) 100 mg capsule Take 1 capsule (100 mg) by mouth 3 times a day as needed for cough. Do not crush or chew. (Patient not taking: Reported on 4/3/2025) 42 capsule 0 Not Taking    Blood glucose monitoring meter kit kit Test twice daily 1 each 0 Unknown    loratadine (Claritin) 10 mg tablet Take 1 tablet (10 mg) by mouth once daily. (Patient not taking: Reported on 4/3/2025) 30 tablet 2 Unknown    metFORMIN (Glucophage) 500 mg tablet Take 1 tablet (500 mg) by mouth 2 times daily (morning and late afternoon). (Patient not taking: Reported on 4/3/2025) 200 tablet 3 Unknown    nabumetone (Relafen) 750 mg tablet Take 1 tablet (750 mg) by mouth 2 times a day. (Patient not taking: Reported on 4/3/2025) 60 tablet 2 Unknown     Current medications:  Scheduled medications  aspirin, 81 mg, oral, Daily  ipratropium-albuteroL, 3 mL, nebulization, Once  ipratropium-albuteroL, 3 mL, nebulization, Once  levothyroxine, 75 mcg, oral, Nightly  losartan, 100 mg, oral, Nightly  metoprolol succinate XL, 100 mg, oral, Nightly  pantoprazole, 20 mg, oral, Daily before breakfast  piperacillin-tazobactam, 4.5 g, intravenous, q6h  rosuvastatin, 20 mg, oral, Nightly  vancomycin, 1,250 mg, intravenous, q12h      Continuous medications     PRN medications  PRN medications: albuterol, cyclobenzaprine, HYDROmorphone, oxyCODONE-acetaminophen, vancomycin    Review of Systems   Gastrointestinal:  Negative for abdominal distention, constipation, diarrhea, nausea and vomiting.   Skin:  Positive for color change and wound. Negative for rash.        Objective  Range of Vitals (last 24 hours)  Heart Rate:  [63-85]   Temp:  [36 °C (96.8 °F)-36.9 °C (98.4 °F)]   Resp:  [16-20]    BP: ()/(53-89)   Height:  [182.9 cm (6')]   Weight:  [108 kg (238 lb 1.6 oz)-109 kg (241 lb 4.8 oz)]   SpO2:  [96 %-100 %]   Daily Weight  04/04/25 : 108 kg (238 lb 12.1 oz)    Body mass index is 32.38 kg/m².     Physical Exam  Constitutional:       Appearance: Normal appearance.   HENT:      Head: Normocephalic and atraumatic.   Eyes:      Extraocular Movements: Extraocular movements intact.      Conjunctiva/sclera: Conjunctivae normal.   Cardiovascular:      Rate and Rhythm: Normal rate and regular rhythm.   Pulmonary:      Effort: Pulmonary effort is normal.      Breath sounds: Normal breath sounds.   Abdominal:      General: Bowel sounds are normal.      Palpations: Abdomen is soft.      Tenderness: There is abdominal tenderness. There is no guarding.   Musculoskeletal:         General: Normal range of motion.      Cervical back: Normal range of motion and neck supple.   Skin:     General: Skin is warm and dry.      Comments: Left hallux gangrene, left foot erythema   Neurological:      General: No focal deficit present.      Mental Status: He is alert and oriented to person, place, and time.   Psychiatric:         Mood and Affect: Mood normal.         Behavior: Behavior normal.          Relevant Results    Labs  Results from last 72 hours   Lab Units 04/03/25  1724   WBC AUTO x10*3/uL 12.0*   HEMOGLOBIN g/dL 14.4   HEMATOCRIT % 42.3   PLATELETS AUTO x10*3/uL 232   NEUTROS PCT AUTO % 74.2   LYMPHS PCT AUTO % 16.9   MONOS PCT AUTO % 7.2   EOS PCT AUTO % 0.8     Results from last 72 hours   Lab Units 04/03/25  1724   SODIUM mmol/L 134*   POTASSIUM mmol/L 3.9   CHLORIDE mmol/L 102   CO2 mmol/L 25   BUN mg/dL 7   CREATININE mg/dL 0.65   GLUCOSE mg/dL 100*   CALCIUM mg/dL 9.0   ANION GAP mmol/L 11   EGFR mL/min/1.73m*2 >90     Results from last 72 hours   Lab Units 04/03/25  1724   ALK PHOS U/L 69   BILIRUBIN TOTAL mg/dL 0.4   PROTEIN TOTAL g/dL 7.0   ALT U/L 19   AST U/L 20   ALBUMIN g/dL 3.9     Estimated  Creatinine Clearance: 125 mL/min (by C-G formula based on SCr of 0.65 mg/dL).  C-Reactive Protein   Date Value Ref Range Status   04/03/2025 2.50 (H) <1.00 mg/dL Final     Sedimentation Rate   Date Value Ref Range Status   04/03/2025 28 (H) 0 - 20 mm/h Final   03/28/2024 9 0 - 20 mm/h Final   06/10/2019 6 0 - 20 mm/h Final     HIV 1 and 2 Screen   Date Value Ref Range Status   08/20/2021 NONREACTIVE NONREACTIVE Final     Comment:      HIV Ag/Ab screen is performed using the [employer]   HIV Ag/Ab Combo assay which detects the presence of HIV    p24 antigen as well as antibodies to HIV-1   (Group M and O) and HIV-2.  .  No laboratory evidence of HIV infection. If acute HIV infection is   suspected, consider testing for HIV RNA by PCR (viral load).       Hepatitis C Ab   Date Value Ref Range Status   08/20/2021 NONREACTIVE NONREACTIVE Final     Comment:      Results from patients taking biotin supplements or receiving   high-dose biotin therapy should be interpreted with caution   due to possible interference with this test. Providers may    contact their local laboratory for further information.       Microbiology  Blood cultures pending    Cultures reviewed-negative urine culture 2022, perineal abscess culture with bacteroides fragilis 2022  Imaging  XR chest 2 views    Result Date: 4/4/2025  Interpreted By:  Marisela Dempsye, STUDY: XR CHEST 2 VIEWS 4/3/2025 9:02 pm   INDICATION: Signs/Symptoms:shortness of breath, cough x6 weeks   COMPARISON: 03/03/2025   ACCESSION NUMBER(S): [id number]   ORDERING CLINICIAN: GURDEEP ANTHONY   TECHNIQUE: AP erect and lateral views of the chest were acquired.   FINDINGS: Normal heart, mediastinum, and lungs. A small amount of calcified plaque within the aortic arch is seen. There are degenerative changes of the thoracic spine.       No acute cardiopulmonary disease.   Signed by: Marisela Dempsey 4/4/2025 9:01 AM Dictation workstation:   [id number]    XR foot left 3+ views    Result  Date: 4/3/2025  Interpreted By:  Marisela Dempsey, STUDY: XR FOOT LEFT 3+ VIEWS 4/3/2025 4:58 pm   INDICATION: Discoloration of the great toe for 2 weeks. Swelling and erythema.   COMPARISON: None available.   ACCESSION NUMBER(S): [id number]   ORDERING CLINICIAN: ESPINOZA SIMPSON   TECHNIQUE: Three views of the left foot   FINDINGS: There is soft tissue swelling of the great toe with mild narrowing of the 1st MTP joint and with mild osteophytosis about this joint. There is a focal area of osteomyelitis along the medial aspect of the tuft of the distal phalanx of the great toe suggested on the oblique view suspicious for small area of osteomyelitis at this site.   Plantar and posterior calcaneal spurs are present.       On oblique view, there is a small area of cortical destruction suggested along the medial aspect of the tuft of the distal phalanx of the great toe suggesting a small area of osteomyelitis at this site.   DJD of the 1st MTP joint.   Plantar and posterior calcaneal spurs.   Signed by: Marisela Dempsey 4/3/2025 5:07 PM Dictation workstation:   [id number]    CT angio aorta and bilateral iliofemoral runoff including without contrast if performed    Result Date: 4/1/2025  Interpreted By:  Mario Hall, STUDY: CT ANGIO AORTA AND BILATERAL ILIOFEMORAL RUN OFF INCLUDING WITHOUT CONTRAST IF PERFORMED;  3/31/2025 9:36 am   INDICATION: Signs/Symptoms:PVD   COMPARISON: 08/09/2023 CT abdomen and pelvis   ACCESSION NUMBER(S): [id number]   ORDERING CLINICIAN: DEEPA ROMAN   TECHNIQUE: Thin-section axial images of the abdomen and pelvis in the arterial phase after intravenous administration of  75 mL Omnipaque 350. Sagittal and coronal reconstructions. 3D reconstructions were obtained at a separate workstation.   FINDINGS: Vascular:  No aortic aneurysm or dissection.   The celiac trunk, superior mesenteric artery, renal arteries and inferior mesenteric artery are patent.   Right lower extremity: Right common iliac artery  is widely patent. Internal iliac artery is patent. External iliac artery is occluded at its origin without reconstitution. There is reconstitution of the common femoral artery. Severe stenosis at the origin of the profundus with distal reconstitution. Superficial femoral artery is patent throughout. Popliteal artery is patent. Proximal tibial arteries are patent. The peroneal and posterior tibial arteries are patent to the ankle. The anterior tibial artery is attenuated along the distal leg.   Left lower extremity: Left common iliac artery is patent. Internal iliac artery is occluded with some degree of distal reconstitution about the distal branches. External iliac artery is patent. Common femoral artery is patent. Profundus is patent. Superficial femoral artery is occluded proximally with distal reconstitution. Popliteal artery is patent. Three-vessel tibial runoff to the ankle.   LOWER CHEST: No acute abnormality of the lung bases. BONES: No acute osseous abnormality. ABDOMINAL WALL: Fat filled right inguinal hernia.   ABDOMEN:   LIVER: Within normal limits. BILE DUCTS: Normal caliber. GALLBLADDER: No calcified gallstones. No wall thickening. PANCREAS: Within normal limits. SPLEEN: Within normal limits. ADRENALS: Within normal limits. KIDNEYS and URETERS: Symmetric renal enhancement. No hydronephrosis.   RETROPERITONEUM: No pathologically enlarged retroperitoneal lymph nodes.   PELVIS:   REPRODUCTIVE ORGANS: No pelvic masses. BLADDER: Within normal limits.   BOWEL: No dilated bowel.  Normal appendix. PERITONEUM: No ascites or free air, no fluid collection.       1. Occlusion of the right external iliac artery with right common femoral artery reconstitution. Severe stenosis at the origin of the right profundus with distal reconstitution. Superficial femoral artery is patent. Two-vessel runoff to the ankle as above. 2. Left internal iliac artery occlusion with distal reconstitution. Left superficial femoral artery  occluded proximally with distal reconstitution. Popliteal artery and distal tibial vessels patent with three-vessel runoff. 3. No acute process within the abdomen or pelvis.   MACRO: none   Signed by: Mario Hall 4/1/2025 10:31 AM Dictation workstation:   [id number]    Vascular US PVR with exercise    Result Date: 3/28/2025           [place] [address]            Phone [phone]  Vascular Lab Report  [facility] US PVR WITH EXERCISE Patient Name:      LEIGHANN FOREMAN       Reading Physician:  [id number] Rosario Draper MD Study Date:        3/28/2025           Ordering Provider:  [id number] RAFAL GUTIERRES MRN/PID:           [id number]            Fellow: Accession#:        [id number]        Technologist:       Gabi Mcfadden RVJOSE Date of Birth/Age: 1965 / 59     Technologist 2:                    years Gender:            M                   Encounter#:         [id number] Admission Status:  Outpatient          Location Performed: [facility]  Diagnosis/ICD: Atherosclerosis of native arteries of extremities with                intermittent claudication, bilateral legs-I70.213 CPT Codes:     20614 Peripheral artery PVR with exercise  Pertinent History: Purple discoloration of all left toes.  CONCLUSIONS: Right Lower PVR: There is evidence of mild multi vessel disease. Hemodynamics are further compromised in the right lower extremity with exercise. Decreased digital perfusion noted. Monophasic flow is noted in the right common femoral artery, right popliteal artery, right posterior tibial artery and right dorsalis pedis artery. Left Lower PVR: There is evidence of mild multi-vessel disease. Hemodynamics are further compromised in the left lower extremity with exercise. Decreased digital perfusion noted. Monophasic flow is noted in the left common femoral artery, left popliteal artery and left dorsalis pedis artery. Left  posterior tibial artery is not audible. Exercise:Exercise completed at 10% grade and 2 miles per hour for 2 minutes. The patient experienced pain in the right calf and the left calf at 1.5 minutes. The exam was terminated due to extremity pain.  Imaging & Doppler Findings:  RIGHT Lower PVR                Pressures Ratios Right High Thigh               142 mmHg  1.10 Right Low Thigh                125 mmHg  0.97 Right Calf                     104 mmHg  0.81 Right Posterior Tibial (Ankle) 105 mmHg  0.81 Right Dorsalis Pedis (Ankle)   109 mmHg  0.84 Right Digit (Great Toe)        62 mmHg   0.48  Right Ankle Post Exercise      64 mmHg   0.40 5 Minute Pressure              90 mmHg   0.64  LEFT Lower PVR              Pressures Ratios Left High Thigh             135 mmHg  1.05 Left Low Thigh              111 mmHg  0.86 Left Calf                   96 mmHg   0.74 Left Dorsalis Pedis (Ankle) 91 mmHg   0.71 Left Digit (Great Toe)      41 mmHg   0.32  Left Ankle Post Exercise    53 mmHg   0.33 5 Minute Pressure           54 mmHg   0.38                     Right     Left Brachial Pressure 121 mmHg 129 mmHg   [id number] Rosario Draper MD Electronically signed by [id number] Rosario Draper MD on 3/28/2025 at 12:09:30 PM  ** Final **      Assessment/Plan   Left hallux gangrene  Left foot cellulitis  Left hallux osteomyelitis  Peripheral arterial disease with left limb subacute ischemia  Abdominal pain-unclear etiology, CTA with runoff on 4/1 without acute process, abdominal pain started 4/4/25-CT abd/pelvis with oral contrast ordered, patient reports a history of diverticulitis    Continue IV zosyn  Continue IV vancomycin  CT abd/pelvis with oral contrast to evaluate abdominal pain-onset 4/4/25  Monitor WBC and temperature  Monitor renal function  Follow-up blood cultures  Vascular follow-up - plan for aortogram with bilateral iliac intervention on Monday 4/7/25  Podiatry consult   Further recommendations based on pending work-up    Discussed  with Dr Clemens    Total time spent caring for the patient today was 50 minutes.  This includes time spent before the visit reviewing the chart, time spent during the visit, and time spent after the visit on documentation.      Carolynn Escobar, APRN-CNP

## 2025-04-04 NOTE — ASSESSMENT & PLAN NOTE
-worsening erythema, edema and pain over last few weeks despite augmentin  -admitted from vascular office today  -XR suggesting small area of osteomyelitis in L great toe    Plan:  -control pain: prn toradol (5 days max) and oxycodone as well as dilaudid available for breakthrough pain. Will need to continue to evaluate pain management plan  -empiric abx with vancomycin and zosyn. ID consulted  -podiatry and vascular surgery consult

## 2025-04-04 NOTE — H&P
History Of Present Illness  Connor Farr is a 59 y.o. male presenting with left great toe necrosis from his vascular surgery appointment. Pain and swelling in the left great toe progressively worsening over last few weeks despite augmentin antibiotic at home. Labs now show mild leukocytosis and elevated CRP. Blood cultures in process. Started on empiric IV Vancomycin and Zosyn for suspect osteomyelitis noted on Xray today.     Cough and runny nose x6 weeks with only mild improvement. Rhonchi upper lungs on exam, will check CXR. Current smoker, declines nicotine patch for now. No fevers.      Past Medical History  Past Medical History:   Diagnosis Date    Acute sinusitis, unspecified 08/29/2013    Acute sinusitis    Body mass index (BMI) 27.0-27.9, adult     BMI 27.0-27.9,adult    Fatty liver     High cholesterol     Hypertension     Personal history of other diseases of the respiratory system 11/26/2013    History of acute bronchitis    Personal history of other specified conditions 08/01/2013    History of abdominal pain       Surgical History  History reviewed. No pertinent surgical history.     Social History  He reports that he has been smoking cigarettes. He has never used smokeless tobacco. He reports that he does not drink alcohol and does not use drugs.    Family History  Family History   Problem Relation Name Age of Onset    Other (MTHFR mutation) Sister      No Known Problems Daughter      No Known Problems Daughter      No Known Problems Son      Hypertension Other FHx         essential    Heart disease Other FHx     Diabetes type II Other FHx         Allergies  Cephalexin    Review of Systems   Constitutional:  Positive for activity change and fatigue. Negative for appetite change, chills, diaphoresis, fever and unexpected weight change.   HENT:  Positive for congestion.    Eyes: Negative.    Respiratory:  Positive for cough and shortness of breath.    Cardiovascular: Negative.    Gastrointestinal:  Negative.    Musculoskeletal: Negative.    Skin:  Positive for color change.        Necrotic great toe left   Neurological: Negative.    Hematological: Negative.    Psychiatric/Behavioral: Negative.          Physical Exam  Vitals reviewed.   Constitutional:       General: He is not in acute distress.     Appearance: He is ill-appearing. He is not toxic-appearing.   HENT:      Head: Normocephalic and atraumatic.      Nose: Congestion present.      Mouth/Throat:      Mouth: Mucous membranes are moist.      Pharynx: Oropharynx is clear.   Eyes:      Conjunctiva/sclera: Conjunctivae normal.   Cardiovascular:      Rate and Rhythm: Normal rate and regular rhythm.      Pulses: Normal pulses.      Heart sounds: Normal heart sounds. No murmur heard.  Pulmonary:      Effort: Pulmonary effort is normal.      Breath sounds: Rhonchi present.      Comments: +rhonci bilateral upper lobes  Cough, clear phlegm  Abdominal:      General: Bowel sounds are normal.      Palpations: There is no mass.      Tenderness: There is no abdominal tenderness.   Musculoskeletal:         General: No swelling.      Cervical back: Neck supple.   Skin:     Coloration: Skin is not pale.      Comments: Necrotic left great toe, erythema, and edema   Neurological:      General: No focal deficit present.      Mental Status: He is alert and oriented to person, place, and time. Mental status is at baseline.   Psychiatric:         Mood and Affect: Mood normal.         Behavior: Behavior normal.         Thought Content: Thought content normal.         Judgment: Judgment normal.          Last Recorded Vitals  Blood pressure 120/80, pulse 84, temperature 36.4 °C (97.5 °F), temperature source Temporal, resp. rate 18, height 1.829 m (6'), weight 109 kg (241 lb), SpO2 96%.    Relevant Results  XR Left foot  IMPRESSION:  On oblique view, there is a small area of cortical destruction  suggested along the medial aspect of the tuft of the distal phalanx  of the great  toe suggesting a small area of osteomyelitis at this  site.     Assessment/Plan   Assessment & Plan  Osteomyelitis of left foot (Multi)  -worsening erythema, edema and pain over last few weeks despite augmentin  -admitted from vascular office today  -XR suggesting small area of osteomyelitis in L great toe    Plan:  -control pain: prn toradol (5 days max) and oxycodone as well as dilaudid available for breakthrough pain. Will need to continue to evaluate pain management plan  -empiric abx with vancomycin and zosyn. ID consulted  -podiatry and vascular surgery consult      Cough  -x6 weeks  -rhonchi BUL on exam  -CXR         Anette Goyal, APRN-CNP

## 2025-04-04 NOTE — CONSULTS
Reason for Consult   Left foot sub acute ischemia, left foot cellulitis    History Of Present Illness    This is a 59-year-old male seen by my colleague, Stephenie PATTON last week after undergoing a vascular study in our office was noted to be abnormal. He had complaints of left hallux discoloration for approximately 2 weeks at that time. He also complained of discomfort of the toe. He was scheduled for CT angio with runoff and follow-up with Dr. Patterson in office today however, she had to reschedule due to emergent case at [facility].  He was seen in office by myself yesterday with his daughter present.  He had stubbed his toe on Sunday, the toenail fell off that evening and he subsequently developed erythema of the foot which has gotten progressively worse over the last couple of days.  He has left foot swelling as well.  Proximal streaking to the ankle is present.  He has gangrene of the hallux present.  Extremely tender to palpation.  The lesser toes are slightly mottled.  He was advised to present to the emergency department yesterday.  Our service is on consult for further workup of his left limb ischemia.  He is currently on IV antibiotics and infectious disease on consult as well.  Cultures are pending.  Podiatry on consult.  White count 12,000    Of note, patient's left foot erythema with substantial improvement.  His right femoral pulse nonpalpable.  Left palpable.  He has biphasic left DP and Doppler.     Past Medical History  Past Medical History:   Diagnosis Date    Acute sinusitis, unspecified 08/29/2013    Acute sinusitis    Body mass index (BMI) 27.0-27.9, adult     BMI 27.0-27.9,adult    Fatty liver     High cholesterol     Hypertension     Personal history of other diseases of the respiratory system 11/26/2013    History of acute bronchitis    Personal history of other specified conditions 08/01/2013    History of abdominal pain         Surgical History  History reviewed. No pertinent surgical  history.       Social History  Social History     Socioeconomic History    Marital status:      Spouse name: Not on file    Number of children: 3    Years of education: Not on file    Highest education level: Not on file   Occupational History    Not on file   Tobacco Use    Smoking status: Every Day     Current packs/day: 0.50     Types: Cigarettes    Smokeless tobacco: Never   Vaping Use    Vaping status: Never Used   Substance and Sexual Activity    Alcohol use: Never     Comment: Social alcohol use    Drug use: Never    Sexual activity: Defer   Other Topics Concern    Not on file   Social History Narrative    Not on file     Social Drivers of Health     Financial Resource Strain: Low Risk  (4/3/2025)    Overall Financial Resource Strain (CARDIA)     Difficulty of Paying Living Expenses: Not hard at all   Food Insecurity: No Food Insecurity (4/3/2025)    Hunger Vital Sign     Worried About Running Out of Food in the Last Year: Never true     Ran Out of Food in the Last Year: Never true   Transportation Needs: No Transportation Needs (4/3/2025)    PRAPARE - Transportation     Lack of Transportation (Medical): No     Lack of Transportation (Non-Medical): No   Physical Activity: Not on file   Stress: Not on file   Social Connections: Not on file   Intimate Partner Violence: Not At Risk (4/3/2025)    Humiliation, Afraid, Rape, and Kick questionnaire     Fear of Current or Ex-Partner: No     Emotionally Abused: No     Physically Abused: No     Sexually Abused: No   Housing Stability: Low Risk  (4/3/2025)    Housing Stability Vital Sign     Unable to Pay for Housing in the Last Year: No     Number of Times Moved in the Last Year: 0     Homeless in the Last Year: No         Family History  Family History   Problem Relation Name Age of Onset    Other (MTHFR mutation) Sister      No Known Problems Daughter      No Known Problems Daughter      No Known Problems Son      Hypertension Other FHx         essential     Heart disease Other FHx     Diabetes type II Other FHx           Allergies  Allergies   Allergen Reactions    Cephalexin Unknown         Relevant Results  Results for orders placed or performed during the hospital encounter of 04/03/25 (from the past 24 hours)   CBC and Auto Differential   Result Value Ref Range    WBC 12.0 (H) 4.4 - 11.3 x10*3/uL    nRBC 0.0 0.0 - 0.0 /100 WBCs    RBC 4.51 4.50 - 5.90 x10*6/uL    Hemoglobin 14.4 13.5 - 17.5 g/dL    Hematocrit 42.3 41.0 - 52.0 %    MCV 94 80 - 100 fL    MCH 31.9 26.0 - 34.0 pg    MCHC 34.0 32.0 - 36.0 g/dL    RDW 13.3 11.5 - 14.5 %    Platelets 232 150 - 450 x10*3/uL    Neutrophils % 74.2 40.0 - 80.0 %    Immature Granulocytes %, Automated 0.4 0.0 - 0.9 %    Lymphocytes % 16.9 13.0 - 44.0 %    Monocytes % 7.2 2.0 - 10.0 %    Eosinophils % 0.8 0.0 - 6.0 %    Basophils % 0.5 0.0 - 2.0 %    Neutrophils Absolute 8.88 (H) 1.20 - 7.70 x10*3/uL    Immature Granulocytes Absolute, Automated 0.05 0.00 - 0.70 x10*3/uL    Lymphocytes Absolute 2.02 1.20 - 4.80 x10*3/uL    Monocytes Absolute 0.86 0.10 - 1.00 x10*3/uL    Eosinophils Absolute 0.09 0.00 - 0.70 x10*3/uL    Basophils Absolute 0.06 0.00 - 0.10 x10*3/uL   Comprehensive Metabolic Panel   Result Value Ref Range    Glucose 100 (H) 74 - 99 mg/dL    Sodium 134 (L) 136 - 145 mmol/L    Potassium 3.9 3.5 - 5.3 mmol/L    Chloride 102 98 - 107 mmol/L    Bicarbonate 25 21 - 32 mmol/L    Anion Gap 11 10 - 20 mmol/L    Urea Nitrogen 7 6 - 23 mg/dL    Creatinine 0.65 0.50 - 1.30 mg/dL    eGFR >90 >60 mL/min/1.73m*2    Calcium 9.0 8.6 - 10.3 mg/dL    Albumin 3.9 3.4 - 5.0 g/dL    Alkaline Phosphatase 69 33 - 120 U/L    Total Protein 7.0 6.4 - 8.2 g/dL    AST 20 9 - 39 U/L    Bilirubin, Total 0.4 0.0 - 1.2 mg/dL    ALT 19 10 - 52 U/L   Lactate   Result Value Ref Range    Lactate 1.6 0.4 - 2.0 mmol/L   Blood Culture    Specimen: Peripheral Venipuncture; Blood culture   Result Value Ref Range    Blood Culture Loaded on Instrument -  Culture in progress    Blood Culture    Specimen: Peripheral Venipuncture; Blood culture   Result Value Ref Range    Blood Culture Loaded on Instrument - Culture in progress    Sedimentation rate, automated   Result Value Ref Range    Sedimentation Rate 28 (H) 0 - 20 mm/h   C-reactive protein   Result Value Ref Range    C-Reactive Protein 2.50 (H) <1.00 mg/dL   Urinalysis with Reflex Culture and Microscopic   Result Value Ref Range    Color, Urine Light-Yellow Light-Yellow, Yellow, Dark-Yellow    Appearance, Urine Clear Clear    Specific Gravity, Urine 1.025 1.005 - 1.035    pH, Urine 5.5 5.0, 5.5, 6.0, 6.5, 7.0, 7.5, 8.0    Protein, Urine NEGATIVE NEGATIVE, 10 (TRACE), 20 (TRACE) mg/dL    Glucose, Urine Normal Normal mg/dL    Blood, Urine NEGATIVE NEGATIVE mg/dL    Ketones, Urine NEGATIVE NEGATIVE mg/dL    Bilirubin, Urine NEGATIVE NEGATIVE mg/dL    Urobilinogen, Urine Normal Normal mg/dL    Nitrite, Urine NEGATIVE NEGATIVE    Leukocyte Esterase, Urine NEGATIVE NEGATIVE   Vancomycin   Result Value Ref Range    Vancomycin 28.1 (H) 5.0 - 20.0 ug/mL   Vancomycin   Result Value Ref Range    Vancomycin 20.2 (H) 5.0 - 20.0 ug/mL     *Note: Due to a large number of results and/or encounters for the requested time period, some results have not been displayed. A complete set of results can be found in Results Review.     Imaging  XR chest 2 views    Result Date: 4/4/2025  No acute cardiopulmonary disease.   Signed by: Marisela Dempsey 4/4/2025 9:01 AM Dictation workstation:   [id number]    XR foot left 3+ views    Result Date: 4/3/2025  On oblique view, there is a small area of cortical destruction suggested along the medial aspect of the tuft of the distal phalanx of the great toe suggesting a small area of osteomyelitis at this site.   DJD of the 1st MTP joint.   Plantar and posterior calcaneal spurs.   Signed by: Marisela Dempsey 4/3/2025 5:07 PM Dictation workstation:   [id number]     Cardiology, Vascular, and Other Imaging  No  other imaging results found for the past 2 days      Physical Exam  Constitutional:  Alert and oriented to person, place, date/time in no acute distress.  HEENT:  Atraumatic, normocephalic. PERRL. EOMI.  Nares patent.  Mucous membranes moist.  .   Neck:  Trachea midline.  Respiratory:  Clear to auscultation.  Cardiac:  Regular rate and rhythm.  No murmurs.  Cardiovascular: +1 edema of the left leg.  Pulse exam: Right femoral pulse not palpable.  Left femoral pulse palpable.  Left PT multiphasic, DP biphasic.  Abdominal:  Soft, nontender, nondistended, bowel sounds present.  Musculoskeletal:  Moves extremities freely.  Left foot tenderness with palpation.  Dermatological: Left hallux with gangrene, dry, no malodor, resolving erythema present of the left foot.   Extremely tender upon palpation.  Increased calor present  Neurological: Alert and oriented to person, place, date/time  Psych:  Calm, cooperative           Assessment/Plan  PAD -left limb subacute limb ischemia  Left foot cellulitis  Left foot hallux gangrene     59-year-old male with left hallux discoloration for approximately 3 weeks.  Underwent vascular study in office on 3/28 with a left YONG of 0.7 dropping to 0.3 with exercise and right 0.8 dropping to 0.4 with exercise.  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 Severe stenosis at the origin of the right profundus with distal reconstitution. Superficial femoral artery is patent. Two-vessel runoff to the ankle as above. 2. Left internal iliac artery occlusion with distal reconstitution. Left superficial femoral artery occluded proximally with distal reconstitution. Popliteal artery and distal tibial vessels patent with three-vessel runoff.    Patient admitted under primary for left foot infection  - X-rays with concern of early osteomyelitis of the tuft of the distal phalanx  -Podiatry following, we recommend  local wound care unless patient becomes septic or there is concern for wet gangrene  - ID on consult, on IV antibiotics  - Cultures pending  - On statin  Plan for aortogram with bilateral iliac intervention on Monday, April 7 by Dr. Oro at 8 AM.  Will follow cultures  -Start aspirin 81 mg daily  -Case request placed  - Make n.p.o. on Sunday evening  -Will need Plavix added after procedure

## 2025-04-05 LAB
ALBUMIN SERPL BCP-MCNC: 3.5 G/DL (ref 3.4–5)
ANION GAP SERPL CALCULATED.3IONS-SCNC: 11 MMOL/L (ref 10–20)
BUN SERPL-MCNC: 6 MG/DL (ref 6–23)
CALCIUM SERPL-MCNC: 8.7 MG/DL (ref 8.6–10.3)
CHLORIDE SERPL-SCNC: 105 MMOL/L (ref 98–107)
CO2 SERPL-SCNC: 24 MMOL/L (ref 21–32)
CREAT SERPL-MCNC: 0.54 MG/DL (ref 0.5–1.3)
EGFRCR SERPLBLD CKD-EPI 2021: >90 ML/MIN/1.73M*2
GLUCOSE SERPL-MCNC: 109 MG/DL (ref 74–99)
HOLD SPECIMEN: NORMAL
HOLD SPECIMEN: NORMAL
PHOSPHATE SERPL-MCNC: 2.9 MG/DL (ref 2.5–4.9)
POTASSIUM SERPL-SCNC: 3.7 MMOL/L (ref 3.5–5.3)
SODIUM SERPL-SCNC: 136 MMOL/L (ref 136–145)
VANCOMYCIN SERPL-MCNC: 9.1 UG/ML (ref 5–20)

## 2025-04-05 PROCEDURE — 80202 ASSAY OF VANCOMYCIN: CPT

## 2025-04-05 PROCEDURE — 2500000001 HC RX 250 WO HCPCS SELF ADMINISTERED DRUGS (ALT 637 FOR MEDICARE OP): Performed by: NURSE PRACTITIONER

## 2025-04-05 PROCEDURE — 80069 RENAL FUNCTION PANEL: CPT

## 2025-04-05 PROCEDURE — 36415 COLL VENOUS BLD VENIPUNCTURE: CPT

## 2025-04-05 PROCEDURE — 2500000002 HC RX 250 W HCPCS SELF ADMINISTERED DRUGS (ALT 637 FOR MEDICARE OP, ALT 636 FOR OP/ED): Performed by: NURSE PRACTITIONER

## 2025-04-05 PROCEDURE — S4991 NICOTINE PATCH NONLEGEND: HCPCS | Performed by: INTERNAL MEDICINE

## 2025-04-05 PROCEDURE — 2500000002 HC RX 250 W HCPCS SELF ADMINISTERED DRUGS (ALT 637 FOR MEDICARE OP, ALT 636 FOR OP/ED): Performed by: INTERNAL MEDICINE

## 2025-04-05 PROCEDURE — 2500000004 HC RX 250 GENERAL PHARMACY W/ HCPCS (ALT 636 FOR OP/ED): Performed by: INTERNAL MEDICINE

## 2025-04-05 PROCEDURE — 1210000001 HC SEMI-PRIVATE ROOM DAILY

## 2025-04-05 PROCEDURE — 2500000001 HC RX 250 WO HCPCS SELF ADMINISTERED DRUGS (ALT 637 FOR MEDICARE OP): Performed by: INTERNAL MEDICINE

## 2025-04-05 RX ORDER — VANCOMYCIN 1.5 G/300ML
1500 INJECTION, SOLUTION INTRAVENOUS EVERY 12 HOURS
Status: DISPENSED | OUTPATIENT
Start: 2025-04-05

## 2025-04-05 RX ADMIN — PIPERACILLIN SODIUM AND TAZOBACTAM SODIUM 4.5 G: 4; .5 INJECTION, SOLUTION INTRAVENOUS at 17:45

## 2025-04-05 RX ADMIN — PIPERACILLIN SODIUM AND TAZOBACTAM SODIUM 4.5 G: 4; .5 INJECTION, SOLUTION INTRAVENOUS at 00:00

## 2025-04-05 RX ADMIN — VANCOMYCIN 1.5 G: 1.5 INJECTION, SOLUTION INTRAVENOUS at 20:51

## 2025-04-05 RX ADMIN — LOSARTAN POTASSIUM 100 MG: 100 TABLET, FILM COATED ORAL at 20:52

## 2025-04-05 RX ADMIN — PANTOPRAZOLE SODIUM 20 MG: 20 TABLET, DELAYED RELEASE ORAL at 06:06

## 2025-04-05 RX ADMIN — ROSUVASTATIN CALCIUM 20 MG: 20 TABLET, FILM COATED ORAL at 20:52

## 2025-04-05 RX ADMIN — METOPROLOL SUCCINATE 100 MG: 100 TABLET, EXTENDED RELEASE ORAL at 20:52

## 2025-04-05 RX ADMIN — ASPIRIN 81 MG: 81 TABLET, COATED ORAL at 09:03

## 2025-04-05 RX ADMIN — OXYCODONE HYDROCHLORIDE AND ACETAMINOPHEN 1 TABLET: 5; 325 TABLET ORAL at 17:45

## 2025-04-05 RX ADMIN — HYDROMORPHONE HYDROCHLORIDE 0.2 MG: 1 INJECTION, SOLUTION INTRAMUSCULAR; INTRAVENOUS; SUBCUTANEOUS at 20:52

## 2025-04-05 RX ADMIN — VANCOMYCIN 1.5 G: 1.5 INJECTION, SOLUTION INTRAVENOUS at 09:42

## 2025-04-05 RX ADMIN — PIPERACILLIN SODIUM AND TAZOBACTAM SODIUM 4.5 G: 4; .5 INJECTION, SOLUTION INTRAVENOUS at 05:41

## 2025-04-05 RX ADMIN — LEVOTHYROXINE SODIUM 75 MCG: 0.07 TABLET ORAL at 20:52

## 2025-04-05 RX ADMIN — NICOTINE 1 PATCH: 14 PATCH, EXTENDED RELEASE TRANSDERMAL at 09:04

## 2025-04-05 RX ADMIN — PIPERACILLIN SODIUM AND TAZOBACTAM SODIUM 4.5 G: 4; .5 INJECTION, SOLUTION INTRAVENOUS at 11:34

## 2025-04-05 ASSESSMENT — PAIN DESCRIPTION - DESCRIPTORS
DESCRIPTORS: ACHING

## 2025-04-05 ASSESSMENT — COGNITIVE AND FUNCTIONAL STATUS - GENERAL
DAILY ACTIVITIY SCORE: 24
MOBILITY SCORE: 24

## 2025-04-05 ASSESSMENT — PAIN - FUNCTIONAL ASSESSMENT
PAIN_FUNCTIONAL_ASSESSMENT: 0-10

## 2025-04-05 ASSESSMENT — PAIN DESCRIPTION - ORIENTATION: ORIENTATION: RIGHT;LEFT

## 2025-04-05 ASSESSMENT — PAIN SCALES - GENERAL
PAINLEVEL_OUTOF10: 2
PAINLEVEL_OUTOF10: 0 - NO PAIN
PAINLEVEL_OUTOF10: 7
PAINLEVEL_OUTOF10: 7
PAINLEVEL_OUTOF10: 2

## 2025-04-05 ASSESSMENT — PAIN DESCRIPTION - LOCATION: LOCATION: LEG

## 2025-04-05 NOTE — PROGRESS NOTES
Vancomycin Dosing by Pharmacy- FOLLOW UP    Connor Farr is a 59 y.o. year old male who Pharmacy has been consulted for vancomycin dosing for osteomyelitis/septic arthritis. Based on the patient's indication and renal status this patient is being dosed based on a goal AUC of 400-600.     Renal function is currently stable.    Current vancomycin dose: 1250 mg given every 12 hours    Estimated vancomycin AUC on current dose: 375 mg/L.hr     Visit Vitals  /72 (BP Location: Right arm, Patient Position: Lying)   Pulse 64   Temp 36.5 °C (97.7 °F) (Oral)   Resp 17        Lab Results   Component Value Date    CREATININE 0.54 2025    CREATININE 0.65 2025    CREATININE 0.86 2025    CREATININE 0.94 2024        Patient weight is as follows:   Vitals:    25 0301   Weight: 108 kg (238 lb 12.1 oz)       Cultures:  No results found for the encounter in last 14 days.       I/O last 3 completed shifts:  In: 1720 (15.9 mL/kg) [P.O.:620; IV Piggyback:1100]  Out: 575 (5.3 mL/kg) [Urine:575 (0.1 mL/kg/hr)]  Weight: 108.3 kg   I/O during current shift:  No intake/output data recorded.    Temp (24hrs), Av.7 °C (98 °F), Min:36.5 °C (97.7 °F), Max:36.7 °C (98.1 °F)      Assessment/Plan    Below goal AUC. Orders placed for new vancomcyin regimen of 1500 mg every 12 hours to begin at 0900.     This dosing regimen is predicted by [employer] to result in the following pharmacokinetic parameters:    Regimen: 1500 mg IV every 12 hours.  Start time: 08:16 on 2025  Exposure target: AUC24 (range)400-600 mg/L.hr   MUS01-40: 441 mg/L.hr  AUC24,ss: 450 mg/L.hr  Probability of AUC24 > 400: 84 %  Ctrough,ss: 11 mg/L  Probability of Ctrough,ss > 20: 0 %        The next level will be obtained on  at 0500. May be obtained sooner if clinically indicated.   Will continue to monitor renal function daily while on vancomycin and order serum creatinine at least every 48 hours if not already ordered.  Follow for  continued vancomycin needs, clinical response, and signs/symptoms of toxicity.       Julio Trent, [facility]

## 2025-04-05 NOTE — ED PROVIDER NOTES
HPI   Chief Complaint   Patient presents with    Wound Infection     Cellulitis vs gangrene toe from wound clinic       HPI  Patient is a 59-year-old male with history of peripheral vascular disease who presents ED for left foot wound.  Patient's left great toe appears to have a gangrenous wound.  Patient denies any fever or chills.  He does complain of severe pain in the left foot.  No other acute complaints.      Patient History   Past Medical History:   Diagnosis Date    Acute sinusitis, unspecified 08/29/2013    Acute sinusitis    Body mass index (BMI) 27.0-27.9, adult     BMI 27.0-27.9,adult    Fatty liver     High cholesterol     Hypertension     Personal history of other diseases of the respiratory system 11/26/2013    History of acute bronchitis    Personal history of other specified conditions 08/01/2013    History of abdominal pain     History reviewed. No pertinent surgical history.  Family History   Problem Relation Name Age of Onset    Other (MTHFR mutation) Sister      No Known Problems Daughter      No Known Problems Daughter      No Known Problems Son      Hypertension Other FHx         essential    Heart disease Other FHx     Diabetes type II Other FHx      Social History     Tobacco Use    Smoking status: Every Day     Current packs/day: 0.50     Types: Cigarettes    Smokeless tobacco: Never   Vaping Use    Vaping status: Never Used   Substance Use Topics    Alcohol use: Never     Comment: Social alcohol use    Drug use: Never       Physical Exam   ED Triage Vitals   Temp Heart Rate Resp BP   04/03/25 1630 04/03/25 1630 04/03/25 1630 04/03/25 1630   36.4 °C (97.5 °F) 84 18 140/87      SpO2 Temp Source Heart Rate Source Patient Position   04/03/25 1630 04/03/25 1630 04/03/25 2249 04/03/25 1630   99 % Temporal Monitor Sitting      BP Location FiO2 (%)     04/03/25 1630 --     Left arm        Physical Exam  Vitals reviewed.   Constitutional:       General: He is not in acute distress.     Appearance:  Normal appearance. He is not ill-appearing.   HENT:      Head: Normocephalic and atraumatic.   Eyes:      Extraocular Movements: Extraocular movements intact.   Cardiovascular:      Rate and Rhythm: Normal rate and regular rhythm.      Heart sounds: Normal heart sounds.   Pulmonary:      Effort: Pulmonary effort is normal.      Breath sounds: Normal breath sounds.   Abdominal:      General: Abdomen is flat. There is no distension.      Palpations: Abdomen is soft.      Tenderness: There is no abdominal tenderness.   Musculoskeletal:         General: Normal range of motion.      Cervical back: Normal range of motion and neck supple.   Skin:     General: Skin is warm and dry.      Findings: Wound (See attached) present.   Neurological:      Mental Status: He is alert and oriented to person, place, and time.   Psychiatric:         Mood and Affect: Mood normal.         Behavior: Behavior normal.           ED Course & MDM   Diagnoses as of 04/05/25 1233   Osteomyelitis of left foot, unspecified type (Multi)                 No data recorded                                 Medical Decision Making  Parts of this chart have been completed using voice recognition software. Please excuse any errors of transcription.  My thought process and reason for plan has been formulated from the time that I saw the patient until the time of disposition and is not specific to one specific moment during their visit and furthermore my MDM encompasses this entire chart and not only this text box.    HPI:   A medically appropriate HPI was obtained, outlined above.    Connor Farr is a  59 y.o. male    Chief Complaint   Patient presents with    Wound Infection     Cellulitis vs gangrene toe from wound clinic       Past Medical History:   Diagnosis Date    Acute sinusitis, unspecified 08/29/2013    Acute sinusitis    Body mass index (BMI) 27.0-27.9, adult     BMI 27.0-27.9,adult    Fatty liver     High cholesterol     Hypertension     Personal  history of other diseases of the respiratory system 11/26/2013    History of acute bronchitis    Personal history of other specified conditions 08/01/2013    History of abdominal pain       History reviewed. No pertinent surgical history.    Social History     Tobacco Use    Smoking status: Every Day     Current packs/day: 0.50     Types: Cigarettes    Smokeless tobacco: Never   Vaping Use    Vaping status: Never Used   Substance Use Topics    Alcohol use: Never     Comment: Social alcohol use    Drug use: Never       Family History   Problem Relation Name Age of Onset    Other (MTHFR mutation) Sister      No Known Problems Daughter      No Known Problems Daughter      No Known Problems Son      Hypertension Other FHx         essential    Heart disease Other FHx     Diabetes type II Other FHx        Allergies   Allergen Reactions    Cephalexin Unknown       Current Outpatient Medications   Medication Instructions    albuterol (Proventil HFA) 90 mcg/actuation inhaler 2 puffs, inhalation, Every 4 hours PRN    bacitracin 500 unit/gram ointment Topical, 2 times daily    Blood glucose monitoring meter kit kit Test twice daily    cyclobenzaprine (FLEXERIL) 10 mg, oral, Nightly PRN    levothyroxine (SYNTHROID, LEVOXYL) 75 mcg, oral, Daily    loratadine (CLARITIN) 10 mg, oral, Daily    losartan (COZAAR) 100 mg, oral, Daily    metFORMIN (GLUCOPHAGE) 500 mg, oral, 2 times daily (morning and late afternoon)    metoprolol succinate XL (TOPROL-XL) 100 mg, oral, Daily    nabumetone (RELAFEN) 750 mg, oral, 2 times daily    omeprazole (PRILOSEC) 40 mg, oral, Daily    rosuvastatin (CRESTOR) 20 mg, oral, Daily   for details    Exam:   Patient Vitals for the past 24 hrs:   BP Temp Temp src Pulse Resp SpO2   04/05/25 0500 131/65 36.6 °C (97.9 °F) -- 66 18 96 %   04/05/25 0033 112/72 36.5 °C (97.7 °F) Oral 64 17 97 %   04/04/25 1957 123/63 36.6 °C (97.9 °F) Oral 74 16 96 %   04/04/25 1500 124/81 36.7 °C (98.1 °F) Oral 69 16 97 %       A  medically appropriate exam performed, outlined above, given the known history and presentation.    EKG/Cardiac monitor:   If EKG was done and, it was interpreted by attending physician, see their note for ED course for more detail.    Medications given during visit:  Medications   ipratropium-albuteroL (Duo-Neb) 0.5-2.5 mg/3 mL nebulizer solution 3 mL (3 mL nebulization Not Given 4/3/25 1834)   ipratropium-albuteroL (Duo-Neb) 0.5-2.5 mg/3 mL nebulizer solution 3 mL (3 mL nebulization Not Given 4/3/25 1834)   albuterol 2.5 mg /3 mL (0.083 %) nebulizer solution 3 mL (has no administration in time range)   cyclobenzaprine (Flexeril) tablet 10 mg (has no administration in time range)   levothyroxine (Synthroid, Levoxyl) tablet 75 mcg (75 mcg oral Given 4/4/25 2016)   losartan (Cozaar) tablet 100 mg (100 mg oral Given 4/4/25 2016)   metoprolol succinate XL (Toprol-XL) 24 hr tablet 100 mg (100 mg oral Given 4/4/25 2016)   pantoprazole (ProtoNix) EC tablet 20 mg (20 mg oral Given 4/5/25 0606)   rosuvastatin (Crestor) tablet 20 mg (20 mg oral Given 4/4/25 2016)   vancomycin (Vancocin) pharmacy to dose - pharmacy monitoring (has no administration in time range)   piperacillin-tazobactam (Zosyn) 4.5 g in dextrose (iso)  mL (0 g intravenous Stopped 4/5/25 1213)   HYDROmorphone (Dilaudid) injection 0.2 mg (has no administration in time range)   oxyCODONE-acetaminophen (Percocet) 5-325 mg per tablet 1 tablet (1 tablet oral Given 4/4/25 2020)   aspirin EC tablet 81 mg (81 mg oral Given 4/5/25 0903)   nicotine (Nicoderm CQ) 14 mg/24 hr patch 1 patch (1 patch transdermal Medication Applied 4/5/25 0904)   vancomycin (Xellia) 1.5 g in diluent combination  mL (0 g intravenous Stopped 4/5/25 1123)   vancomycin (Xellia) 2 g in diluent combination  mL (0 g intravenous Stopped 4/3/25 2054)   piperacillin-tazobactam (Zosyn) 4.5 g in dextrose (iso)  mL (0 g intravenous Stopped 4/3/25 1816)   HYDROmorphone (Dilaudid)  injection 1 mg (1 mg intravenous Given 4/3/25 1731)   sodium chloride 0.9 % bolus 1,000 mL (0 mL intravenous Stopped 4/3/25 1834)   iohexol (OMNIPaque) 12 mg iodine/mL oral contrast 500 mL (500 mL oral Given 4/4/25 1839)        Diagnostic/tests:  Labs Reviewed   CBC WITH AUTO DIFFERENTIAL - Abnormal       Result Value    WBC 12.0 (*)     nRBC 0.0      RBC 4.51      Hemoglobin 14.4      Hematocrit 42.3      MCV 94      MCH 31.9      MCHC 34.0      RDW 13.3      Platelets 232      Neutrophils % 74.2      Immature Granulocytes %, Automated 0.4      Lymphocytes % 16.9      Monocytes % 7.2      Eosinophils % 0.8      Basophils % 0.5      Neutrophils Absolute 8.88 (*)     Immature Granulocytes Absolute, Automated 0.05      Lymphocytes Absolute 2.02      Monocytes Absolute 0.86      Eosinophils Absolute 0.09      Basophils Absolute 0.06     COMPREHENSIVE METABOLIC PANEL - Abnormal    Glucose 100 (*)     Sodium 134 (*)     Potassium 3.9      Chloride 102      Bicarbonate 25      Anion Gap 11      Urea Nitrogen 7      Creatinine 0.65      eGFR >90      Calcium 9.0      Albumin 3.9      Alkaline Phosphatase 69      Total Protein 7.0      AST 20      Bilirubin, Total 0.4      ALT 19     SEDIMENTATION RATE, AUTOMATED - Abnormal    Sedimentation Rate 28 (*)    C-REACTIVE PROTEIN - Abnormal    C-Reactive Protein 2.50 (*)    VANCOMYCIN - Abnormal    Vancomycin 28.1 (*)     Narrative:     Vancomycin levels can be monitored according to area under the curve (AUC) or concentration (ug/mL). The preferred monitoring strategy is determined by the patient's renal function and indication for therapy.     For AUC monitoring, a random vancomycin level should be interpreted in the context of AUC rather than the concentration at a single point in time.    For concentration monitoring, a trough concentration drawn immediately prior to the next dose is preferred.       Therapeutic ranges using concentration-guided results:  Peak (all ages):                   30.0-40.0 ug/mL  Trough (all ages):                10.0-20.0 ug/mL              VANCOMYCIN - Abnormal    Vancomycin 20.2 (*)     Narrative:     Vancomycin levels can be monitored according to area under the curve (AUC) or concentration (ug/mL). The preferred monitoring strategy is determined by the patient's renal function and indication for therapy.     For AUC monitoring, a random vancomycin level should be interpreted in the context of AUC rather than the concentration at a single point in time.    For concentration monitoring, a trough concentration drawn immediately prior to the next dose is preferred.       Therapeutic ranges using concentration-guided results:  Peak (all ages):                  30.0-40.0 ug/mL  Trough (all ages):                10.0-20.0 ug/mL              RENAL FUNCTION PANEL - Abnormal    Glucose 109 (*)     Sodium 136      Potassium 3.7      Chloride 105      Bicarbonate 24      Anion Gap 11      Urea Nitrogen 6      Creatinine 0.54      eGFR >90      Calcium 8.7      Phosphorus 2.9      Albumin 3.5     BLOOD CULTURE - Normal    Blood Culture No growth at 1 day     BLOOD CULTURE - Normal    Blood Culture No growth at 1 day     LACTATE - Normal    Lactate 1.6      Narrative:     Venipuncture immediately after or during the administration of Metamizole may lead to falsely low results. Testing should be performed immediately prior to Metamizole dosing.   URINALYSIS WITH REFLEX CULTURE AND MICROSCOPIC - Normal    Color, Urine Light-Yellow      Appearance, Urine Clear      Specific Gravity, Urine 1.025      pH, Urine 5.5      Protein, Urine NEGATIVE      Glucose, Urine Normal      Blood, Urine NEGATIVE      Ketones, Urine NEGATIVE      Bilirubin, Urine NEGATIVE      Urobilinogen, Urine Normal      Nitrite, Urine NEGATIVE      Leukocyte Esterase, Urine NEGATIVE     VANCOMYCIN - Normal    Vancomycin 9.1      Narrative:     Vancomycin levels can be monitored according to area under  the curve (AUC) or concentration (ug/mL). The preferred monitoring strategy is determined by the patient's renal function and indication for therapy.     For AUC monitoring, a random vancomycin level should be interpreted in the context of AUC rather than the concentration at a single point in time.    For concentration monitoring, a trough concentration drawn immediately prior to the next dose is preferred.       Therapeutic ranges using concentration-guided results:  Peak (all ages):                  30.0-40.0 ug/mL  Trough (all ages):                10.0-20.0 ug/mL              URINALYSIS WITH REFLEX CULTURE AND MICROSCOPIC    Narrative:     The following orders were created for panel order Urinalysis with Reflex Culture and Microscopic.  Procedure                               Abnormality         Status                     ---------                               -----------         ------                     Urinalysis with Reflex C...[[id number]]  Normal              Final result               Extra Urine Gray Tube[[id number]]                            Final result                 Please view results for these tests on the individual orders.   EXTRA URINE GRAY TUBE    Extra Tube Hold for add-ons.          CT abdomen and pelvis w oral contrast only   Final Result   Acute uncomplicated sigmoid diverticulitis.        MACRO:   None        Signed by: Asiya Osborne 4/5/2025 8:23 AM   Dictation workstation:   [id number]      XR chest 2 views   Final Result   No acute cardiopulmonary disease.        Signed by: Marisela Dempsey 4/4/2025 9:01 AM   Dictation workstation:   [id number]      XR foot left 3+ views   Final Result   On oblique view, there is a small area of cortical destruction   suggested along the medial aspect of the tuft of the distal phalanx   of the great toe suggesting a small area of osteomyelitis at this   site.        DJD of the 1st MTP joint.        Plantar and posterior calcaneal spurs.        Signed  by: Marisela Dempsey 4/3/2025 5:07 PM   Dictation workstation:   [id number]             [facility] Summary:  There is evidence of osteomyelitis on x-ray today.  Patient started on Vanco and Zosyn in the ED.  Patient's pain was poorly controlled and he was given Dilaudid.  Lab work shows a mild leukocytosis.  Otherwise essentially unremarkable.  Podiatry and vascular surgery consulted.  Patient admitted to medical service for further management.    Disposition:  ED Prescriptions    None           Procedure  Procedures     Emre Su PA-C  04/05/25 3582

## 2025-04-05 NOTE — PROGRESS NOTES
Connor Farr is a 59 y.o. male on day 2 of admission presenting with Osteomyelitis of left foot (Multi).      Subjective   For procedure on Monday ,ok, explained       Objective     Last Recorded Vitals  /65 (BP Location: Right arm)   Pulse 66   Temp 36.6 °C (97.9 °F)   Resp 18   Wt 108 kg (238 lb 12.1 oz)   SpO2 96%   Intake/Output last 3 Shifts:    Intake/Output Summary (Last 24 hours) at 4/5/2025 1317  Last data filed at 4/5/2025 1213  Gross per 24 hour   Intake 1740 ml   Output --   Net 1740 ml       Admission Weight  Weight: 109 kg (241 lb) (04/03/25 1630)    Daily Weight  04/04/25 : 108 kg (238 lb 12.1 oz)    Image Results  CT abdomen and pelvis w oral contrast only  Narrative: Interpreted By:  Asiya Osborne,   STUDY:  CT ABDOMEN AND PELVIS W ORAL CONTRAST ONLY;  4/4/2025 6:39 pm      INDICATION:  Signs/Symptoms:LLQ abdominal pain.      COMPARISON:  CT abdomen pelvis 08/29/2023      ACCESSION NUMBER(S):  [id number]      ORDERING CLINICIAN:  SUELLEN STAFF      TECHNIQUE:  Axial CT of the abdomen and pelvis was performed without intravenous  contrast with coronal and sagittal reconstructions.      FINDINGS:  Lower chest: Mild bilateral atelectasis dependent atelectasis without  focal consolidation or pleural effusion.      Liver: Stable hepatomegaly measuring 19 cm in craniocaudal dimension.  Hepatic steatosis. No mass.      Biliary: No intrahepatic or extrahepatic bile duct dilation. No  cholelithiasis.      Spleen: Unremarkable.      Pancreas: Unremarkable.      Adrenals: Unremarkable.      Kidneys: Unremarkable.      GI tract: There is sigmoid diverticulosis with wall thickening in the  proximal segment and moderate fat stranding surrounding a  diverticulum consistent with acute diverticulitis (series 4, image  118). No adjacent organized fluid collection or free air is  visualized. The remaining colon, appendix, small bowel and stomach  are unremarkable. A small hiatal hernia is noted.       Lymph nodes: Unremarkable.      Mesentery/peritoneum: No free fluid, free air or fluid collection.      Vasculature: Mild abdominal aortic atherosclerotic calcifications  without aneurysmal dilation.      Pelvis: No free fluid, free air or fluid collection. Mild wall  thickening along the anterior bladder dome likely reactive to the  adjacent diverticulitis.      Bones/Soft tissues: Bilateral fat containing inguinal hernias,  moderate-sized on the right and small on the left. Tiny fat  containing umbilical hernia. Multilevel degenerative disc disease.      Impression: Acute uncomplicated sigmoid diverticulitis.      MACRO:  None      Signed by: Asiya Osborne 4/5/2025 8:23 AM  Dictation workstation:   [id number]      Physical Exam/left big toe more necrotic    Relevant Results               Assessment/Plan                  Assessment & Plan  Osteomyelitis of left foot (Multi)  -worsening erythema, edema and pain over last few weeks despite augmentin  -admitted from vascular office today  -XR suggesting small area of osteomyelitis in L great toe    Plan:  -control pain: prn toradol (5 days max) and oxycodone as well as dilaudid available for breakthrough pain. Will need to continue to evaluate pain management plan  -empiric abx with vancomycin and zosyn. ID consulted  -podiatry and vascular surgery consult      Cough  -x6 weeks  -rhonchi BUL on exam  -CXR  PAD (peripheral artery disease) (CMS-HCC)    Necrotic toe worse, procedure Monday              Katiuska Esparza MD

## 2025-04-05 NOTE — PROGRESS NOTES
Connor Farr is a 59 y.o. male on day 2 of admission presenting with Osteomyelitis of left foot (Multi).    Subjective   Interval History:   Reports abdominal discomfort  CT showed Acute uncomplicated sigmoid diverticulitis.       Reports left foot pain  Afebrile, no chills  No shortness of breath or chest pain    Objective   Range of Vitals (last 24 hours)  Heart Rate:  [64-74]   Temp:  [36.5 °C (97.7 °F)-36.7 °C (98.1 °F)]   Resp:  [16-18]   BP: (112-131)/(63-81)   SpO2:  [96 %-97 %]   Daily Weight  04/04/25 : 108 kg (238 lb 12.1 oz)    Body mass index is 32.38 kg/m².    Physical Exam  Constitutional:       Appearance: Normal appearance.   HENT:      Head: Normocephalic and atraumatic.      Nose: Nose normal.   Eyes:      General: No scleral icterus.  Cardiovascular:      Rate and Rhythm: Normal rate and regular rhythm.   Pulmonary:      Effort: Pulmonary effort is normal.      Breath sounds: Normal breath sounds.   Abdominal:      General: Bowel sounds are normal.      Palpations: Abdomen is soft.      Tenderness: There is abdominal tenderness.   Musculoskeletal:         General: Normal range of motion.      Cervical back: Normal range of motion and neck supple.   Skin:     General: Skin is warm and dry.      Comments: Left hallux gangrene, left foot erythema    Neurological:      General: No focal deficit present.      Mental Status: He is alert.   Psychiatric:         Mood and Affect: Mood normal.         Behavior: Behavior normal.         Antibiotics  bacitracin - 500 unit/gram  piperacillin-tazobactam - 4.5 gram/100 mL  vancomycin - 1.5 gram/300 mL    Relevant Results  Labs  Results from last 72 hours   Lab Units 04/03/25  1724   WBC AUTO x10*3/uL 12.0*   HEMOGLOBIN g/dL 14.4   HEMATOCRIT % 42.3   PLATELETS AUTO x10*3/uL 232   NEUTROS PCT AUTO % 74.2   LYMPHS PCT AUTO % 16.9   MONOS PCT AUTO % 7.2   EOS PCT AUTO % 0.8     Results from last 72 hours   Lab Units 04/05/25  0533 04/03/25  1724   SODIUM mmol/L  136 134*   POTASSIUM mmol/L 3.7 3.9   CHLORIDE mmol/L 105 102   CO2 mmol/L 24 25   BUN mg/dL 6 7   CREATININE mg/dL 0.54 0.65   GLUCOSE mg/dL 109* 100*   CALCIUM mg/dL 8.7 9.0   ANION GAP mmol/L 11 11   EGFR mL/min/1.73m*2 >90 >90   PHOSPHORUS mg/dL 2.9  --      Results from last 72 hours   Lab Units 04/05/25  0533 04/03/25  1724   ALK PHOS U/L  --  69   BILIRUBIN TOTAL mg/dL  --  0.4   PROTEIN TOTAL g/dL  --  7.0   ALT U/L  --  19   AST U/L  --  20   ALBUMIN g/dL 3.5 3.9     Estimated Creatinine Clearance: 125 mL/min (by C-G formula based on SCr of 0.54 mg/dL).  C-Reactive Protein   Date Value Ref Range Status   04/03/2025 2.50 (H) <1.00 mg/dL Final     Microbiology    Blood culture pending     Imaging  CT abdomen and pelvis w oral contrast only    Result Date: 4/5/2025  Interpreted By:  Asiya Osborne, STUDY: CT ABDOMEN AND PELVIS W ORAL CONTRAST ONLY;  4/4/2025 6:39 pm   INDICATION: Signs/Symptoms:LLQ abdominal pain.   COMPARISON: CT abdomen pelvis 08/29/2023   ACCESSION NUMBER(S): [id number]   ORDERING CLINICIAN: SUELLEN STAFF   TECHNIQUE: Axial CT of the abdomen and pelvis was performed without intravenous contrast with coronal and sagittal reconstructions.   FINDINGS: Lower chest: Mild bilateral atelectasis dependent atelectasis without focal consolidation or pleural effusion.   Liver: Stable hepatomegaly measuring 19 cm in craniocaudal dimension. Hepatic steatosis. No mass.   Biliary: No intrahepatic or extrahepatic bile duct dilation. No cholelithiasis.   Spleen: Unremarkable.   Pancreas: Unremarkable.   Adrenals: Unremarkable.   Kidneys: Unremarkable.   GI tract: There is sigmoid diverticulosis with wall thickening in the proximal segment and moderate fat stranding surrounding a diverticulum consistent with acute diverticulitis (series 4, image 118). No adjacent organized fluid collection or free air is visualized. The remaining colon, appendix, small bowel and stomach are unremarkable. A small hiatal  hernia is noted.   Lymph nodes: Unremarkable.   Mesentery/peritoneum: No free fluid, free air or fluid collection.   Vasculature: Mild abdominal aortic atherosclerotic calcifications without aneurysmal dilation.   Pelvis: No free fluid, free air or fluid collection. Mild wall thickening along the anterior bladder dome likely reactive to the adjacent diverticulitis.   Bones/Soft tissues: Bilateral fat containing inguinal hernias, moderate-sized on the right and small on the left. Tiny fat containing umbilical hernia. Multilevel degenerative disc disease.       Acute uncomplicated sigmoid diverticulitis.   MACRO: None   Signed by: Asiya Osborne 4/5/2025 8:23 AM Dictation workstation:   [id number]    XR chest 2 views    Result Date: 4/4/2025  Interpreted By:  Marisela Dempsey, STUDY: XR CHEST 2 VIEWS 4/3/2025 9:02 pm   INDICATION: Signs/Symptoms:shortness of breath, cough x6 weeks   COMPARISON: 03/03/2025   ACCESSION NUMBER(S): [id number]   ORDERING CLINICIAN: GURDEEP ANTHONY   TECHNIQUE: AP erect and lateral views of the chest were acquired.   FINDINGS: Normal heart, mediastinum, and lungs. A small amount of calcified plaque within the aortic arch is seen. There are degenerative changes of the thoracic spine.       No acute cardiopulmonary disease.   Signed by: Marisela Dempsey 4/4/2025 9:01 AM Dictation workstation:   [id number]    XR foot left 3+ views    Result Date: 4/3/2025  Interpreted By:  Marisela Dempsey, STUDY: XR FOOT LEFT 3+ VIEWS 4/3/2025 4:58 pm   INDICATION: Discoloration of the great toe for 2 weeks. Swelling and erythema.   COMPARISON: None available.   ACCESSION NUMBER(S): [id number]   ORDERING CLINICIAN: ESPINOZA SIMPSON   TECHNIQUE: Three views of the left foot   FINDINGS: There is soft tissue swelling of the great toe with mild narrowing of the 1st MTP joint and with mild osteophytosis about this joint. There is a focal area of osteomyelitis along the medial aspect of the tuft of the distal phalanx of the  great toe suggested on the oblique view suspicious for small area of osteomyelitis at this site.   Plantar and posterior calcaneal spurs are present.       On oblique view, there is a small area of cortical destruction suggested along the medial aspect of the tuft of the distal phalanx of the great toe suggesting a small area of osteomyelitis at this site.   DJD of the 1st MTP joint.   Plantar and posterior calcaneal spurs.   Signed by: Marisela Dempsey 4/3/2025 5:07 PM Dictation workstation:   [id number]    CT angio aorta and bilateral iliofemoral runoff including without contrast if performed    Result Date: 4/1/2025  Interpreted By:  Mario Hall, STUDY: CT ANGIO AORTA AND BILATERAL ILIOFEMORAL RUN OFF INCLUDING WITHOUT CONTRAST IF PERFORMED;  3/31/2025 9:36 am   INDICATION: Signs/Symptoms:PVD   COMPARISON: 08/09/2023 CT abdomen and pelvis   ACCESSION NUMBER(S): [id number]   ORDERING CLINICIAN: DEEPA ROMAN   TECHNIQUE: Thin-section axial images of the abdomen and pelvis in the arterial phase after intravenous administration of  75 mL Omnipaque 350. Sagittal and coronal reconstructions. 3D reconstructions were obtained at a separate workstation.   FINDINGS: Vascular:  No aortic aneurysm or dissection.   The celiac trunk, superior mesenteric artery, renal arteries and inferior mesenteric artery are patent.   Right lower extremity: Right common iliac artery is widely patent. Internal iliac artery is patent. External iliac artery is occluded at its origin without reconstitution. There is reconstitution of the common femoral artery. Severe stenosis at the origin of the profundus with distal reconstitution. Superficial femoral artery is patent throughout. Popliteal artery is patent. Proximal tibial arteries are patent. The peroneal and posterior tibial arteries are patent to the ankle. The anterior tibial artery is attenuated along the distal leg.   Left lower extremity: Left common iliac artery is patent. Internal  iliac artery is occluded with some degree of distal reconstitution about the distal branches. External iliac artery is patent. Common femoral artery is patent. Profundus is patent. Superficial femoral artery is occluded proximally with distal reconstitution. Popliteal artery is patent. Three-vessel tibial runoff to the ankle.   LOWER CHEST: No acute abnormality of the lung bases. BONES: No acute osseous abnormality. ABDOMINAL WALL: Fat filled right inguinal hernia.   ABDOMEN:   LIVER: Within normal limits. BILE DUCTS: Normal caliber. GALLBLADDER: No calcified gallstones. No wall thickening. PANCREAS: Within normal limits. SPLEEN: Within normal limits. ADRENALS: Within normal limits. KIDNEYS and URETERS: Symmetric renal enhancement. No hydronephrosis.   RETROPERITONEUM: No pathologically enlarged retroperitoneal lymph nodes.   PELVIS:   REPRODUCTIVE ORGANS: No pelvic masses. BLADDER: Within normal limits.   BOWEL: No dilated bowel.  Normal appendix. PERITONEUM: No ascites or free air, no fluid collection.       1. Occlusion of the right external iliac artery with right common femoral artery reconstitution. Severe stenosis at the origin of the right profundus with distal reconstitution. Superficial femoral artery is patent. Two-vessel runoff to the ankle as above. 2. Left internal iliac artery occlusion with distal reconstitution. Left superficial femoral artery occluded proximally with distal reconstitution. Popliteal artery and distal tibial vessels patent with three-vessel runoff. 3. No acute process within the abdomen or pelvis.   MACRO: none   Signed by: Mario Hall 4/1/2025 10:31 AM Dictation workstation:   [id number]    Vascular US PVR with exercise    Result Date: 3/28/2025           [place] [id number]            Phone [phone]  Vascular Lab Report  [facility] US PVR WITH EXERCISE Patient Name:      LEIGHANN LOVELACEKEON       Reading Physician:  [id number] Rosario Draper                                                             MD Study Date:        3/28/2025           Ordering Provider:  [id number] GRANTGENEVAPUNEET GUTIERRES MRN/PID:           [id number]            Fellow: Accession#:        [id number]        Technologist:       Gabi Mcfadden RVJOSE Date of Birth/Age: 1965 / 59     Technologist 2:                    years Gender:            M                   Encounter#:         [id number] Admission Status:  Outpatient          Location Performed: [facility]  Diagnosis/ICD: Atherosclerosis of native arteries of extremities with                intermittent claudication, bilateral legs-I70.213 CPT Codes:     91770 Peripheral artery PVR with exercise  Pertinent History: Purple discoloration of all left toes.  CONCLUSIONS: Right Lower PVR: There is evidence of mild multi vessel disease. Hemodynamics are further compromised in the right lower extremity with exercise. Decreased digital perfusion noted. Monophasic flow is noted in the right common femoral artery, right popliteal artery, right posterior tibial artery and right dorsalis pedis artery. Left Lower PVR: There is evidence of mild multi-vessel disease. Hemodynamics are further compromised in the left lower extremity with exercise. Decreased digital perfusion noted. Monophasic flow is noted in the left common femoral artery, left popliteal artery and left dorsalis pedis artery. Left posterior tibial artery is not audible. Exercise:Exercise completed at 10% grade and 2 miles per hour for 2 minutes. The patient experienced pain in the right calf and the left calf at 1.5 minutes. The exam was terminated due to extremity pain.  Imaging & Doppler Findings:  RIGHT Lower PVR                Pressures Ratios Right High Thigh               142 mmHg  1.10 Right Low Thigh                125 mmHg  0.97 Right Calf                     104 mmHg  0.81 Right Posterior Tibial (Ankle) 105 mmHg  0.81 Right Dorsalis Pedis (Ankle)   109 mmHg  0.84 Right Digit  (Great Toe)        62 mmHg   0.48  Right Ankle Post Exercise      64 mmHg   0.40 5 Minute Pressure              90 mmHg   0.64  LEFT Lower PVR              Pressures Ratios Left High Thigh             135 mmHg  1.05 Left Low Thigh              111 mmHg  0.86 Left Calf                   96 mmHg   0.74 Left Dorsalis Pedis (Ankle) 91 mmHg   0.71 Left Digit (Great Toe)      41 mmHg   0.32  Left Ankle Post Exercise    53 mmHg   0.33 5 Minute Pressure           54 mmHg   0.38                     Right     Left Brachial Pressure 121 mmHg 129 mmHg   [id number] Rosario Draper MD Electronically signed by [id number] Rosario Draper MD on 3/28/2025 at 12:09:30 PM  ** Final **            Assessment/Plan   Left hallux gangrene  Left foot cellulitis  Left hallux osteomyelitis  Peripheral arterial disease with left limb subacute ischemia  Abdominal pain-unclear etiology, CTA with runoff on 4/1 without acute process, abdominal pain started 4/4/25-CT abd/pelvis with oral contrast ordered, patient reports a history of diverticulitis     Continue IV zosyn  Continue IV vancomycin  CT abd/pelvis with oral contrast to evaluate abdominal pain-onset 4/4/25  Monitor WBC and temperature  Monitor renal function  Follow-up blood cultures  Vascular follow-up - plan for aortogram with bilateral iliac intervention on Monday 4/7/25  Podiatry consult   Further recommendations based on pending work-up          Total time spent caring for the patient today was 25 minutes.  This includes time spent before the visit reviewing the chart, time spent during the visit, and time spent after the visit on documentation.    Ashanti Farias, APRN-CNP

## 2025-04-06 VITALS
HEART RATE: 62 BPM | RESPIRATION RATE: 18 BRPM | WEIGHT: 238.76 LBS | HEIGHT: 72 IN | SYSTOLIC BLOOD PRESSURE: 122 MMHG | BODY MASS INDEX: 32.34 KG/M2 | DIASTOLIC BLOOD PRESSURE: 76 MMHG | TEMPERATURE: 98.6 F | OXYGEN SATURATION: 97 %

## 2025-04-06 LAB
BACTERIA BLD CULT: NORMAL
BACTERIA BLD CULT: NORMAL
CREAT SERPL-MCNC: 0.59 MG/DL (ref 0.5–1.3)
EGFRCR SERPLBLD CKD-EPI 2021: >90 ML/MIN/1.73M*2
HOLD SPECIMEN: NORMAL
VANCOMYCIN SERPL-MCNC: 12.8 UG/ML (ref 5–20)

## 2025-04-06 PROCEDURE — 80202 ASSAY OF VANCOMYCIN: CPT | Performed by: INTERNAL MEDICINE

## 2025-04-06 PROCEDURE — 82565 ASSAY OF CREATININE: CPT | Performed by: INTERNAL MEDICINE

## 2025-04-06 PROCEDURE — 2500000002 HC RX 250 W HCPCS SELF ADMINISTERED DRUGS (ALT 637 FOR MEDICARE OP, ALT 636 FOR OP/ED): Performed by: NURSE PRACTITIONER

## 2025-04-06 PROCEDURE — 99233 SBSQ HOSP IP/OBS HIGH 50: CPT | Performed by: NURSE PRACTITIONER

## 2025-04-06 PROCEDURE — 2500000002 HC RX 250 W HCPCS SELF ADMINISTERED DRUGS (ALT 637 FOR MEDICARE OP, ALT 636 FOR OP/ED): Performed by: INTERNAL MEDICINE

## 2025-04-06 PROCEDURE — 36415 COLL VENOUS BLD VENIPUNCTURE: CPT | Performed by: INTERNAL MEDICINE

## 2025-04-06 PROCEDURE — 2500000004 HC RX 250 GENERAL PHARMACY W/ HCPCS (ALT 636 FOR OP/ED): Mod: JW | Performed by: INTERNAL MEDICINE

## 2025-04-06 PROCEDURE — 2500000001 HC RX 250 WO HCPCS SELF ADMINISTERED DRUGS (ALT 637 FOR MEDICARE OP): Performed by: NURSE PRACTITIONER

## 2025-04-06 PROCEDURE — 1210000001 HC SEMI-PRIVATE ROOM DAILY

## 2025-04-06 PROCEDURE — 2500000001 HC RX 250 WO HCPCS SELF ADMINISTERED DRUGS (ALT 637 FOR MEDICARE OP): Performed by: INTERNAL MEDICINE

## 2025-04-06 PROCEDURE — S4991 NICOTINE PATCH NONLEGEND: HCPCS | Performed by: INTERNAL MEDICINE

## 2025-04-06 RX ADMIN — LEVOTHYROXINE SODIUM 75 MCG: 0.07 TABLET ORAL at 20:37

## 2025-04-06 RX ADMIN — LOSARTAN POTASSIUM 100 MG: 100 TABLET, FILM COATED ORAL at 20:37

## 2025-04-06 RX ADMIN — VANCOMYCIN 1.5 G: 1.5 INJECTION, SOLUTION INTRAVENOUS at 21:46

## 2025-04-06 RX ADMIN — ASPIRIN 81 MG: 81 TABLET, COATED ORAL at 08:50

## 2025-04-06 RX ADMIN — PIPERACILLIN SODIUM AND TAZOBACTAM SODIUM 4.5 G: 4; .5 INJECTION, SOLUTION INTRAVENOUS at 00:00

## 2025-04-06 RX ADMIN — PANTOPRAZOLE SODIUM 20 MG: 20 TABLET, DELAYED RELEASE ORAL at 06:17

## 2025-04-06 RX ADMIN — ROSUVASTATIN CALCIUM 20 MG: 20 TABLET, FILM COATED ORAL at 20:37

## 2025-04-06 RX ADMIN — PIPERACILLIN SODIUM AND TAZOBACTAM SODIUM 4.5 G: 4; .5 INJECTION, SOLUTION INTRAVENOUS at 12:17

## 2025-04-06 RX ADMIN — HYDROMORPHONE HYDROCHLORIDE 0.2 MG: 1 INJECTION, SOLUTION INTRAMUSCULAR; INTRAVENOUS; SUBCUTANEOUS at 00:00

## 2025-04-06 RX ADMIN — PIPERACILLIN SODIUM AND TAZOBACTAM SODIUM 4.5 G: 4; .5 INJECTION, SOLUTION INTRAVENOUS at 06:17

## 2025-04-06 RX ADMIN — VANCOMYCIN 1.5 G: 1.5 INJECTION, SOLUTION INTRAVENOUS at 08:50

## 2025-04-06 RX ADMIN — NICOTINE 1 PATCH: 14 PATCH, EXTENDED RELEASE TRANSDERMAL at 08:53

## 2025-04-06 RX ADMIN — OXYCODONE HYDROCHLORIDE AND ACETAMINOPHEN 1 TABLET: 5; 325 TABLET ORAL at 06:17

## 2025-04-06 RX ADMIN — PIPERACILLIN SODIUM AND TAZOBACTAM SODIUM 4.5 G: 4; .5 INJECTION, SOLUTION INTRAVENOUS at 17:38

## 2025-04-06 RX ADMIN — CYCLOBENZAPRINE 10 MG: 10 TABLET, FILM COATED ORAL at 19:43

## 2025-04-06 RX ADMIN — HYDROMORPHONE HYDROCHLORIDE 0.2 MG: 1 INJECTION, SOLUTION INTRAMUSCULAR; INTRAVENOUS; SUBCUTANEOUS at 20:37

## 2025-04-06 RX ADMIN — METOPROLOL SUCCINATE 100 MG: 100 TABLET, EXTENDED RELEASE ORAL at 20:37

## 2025-04-06 RX ADMIN — OXYCODONE HYDROCHLORIDE AND ACETAMINOPHEN 1 TABLET: 5; 325 TABLET ORAL at 19:43

## 2025-04-06 RX ADMIN — OXYCODONE HYDROCHLORIDE AND ACETAMINOPHEN 1 TABLET: 5; 325 TABLET ORAL at 12:17

## 2025-04-06 ASSESSMENT — ENCOUNTER SYMPTOMS
ABDOMINAL PAIN: 0
CHILLS: 0
SLEEP DISTURBANCE: 0
SHORTNESS OF BREATH: 0
ABDOMINAL DISTENTION: 0
ARTHRALGIAS: 0
DIFFICULTY URINATING: 0
WHEEZING: 0
VOMITING: 0
SPEECH DIFFICULTY: 0
COLOR CHANGE: 0
LIGHT-HEADEDNESS: 0
NAUSEA: 0
HEADACHES: 0
CONFUSION: 0
CONSTIPATION: 0
COUGH: 0
JOINT SWELLING: 1
FEVER: 0
DIARRHEA: 0
DIZZINESS: 0
UNEXPECTED WEIGHT CHANGE: 0
BLOOD IN STOOL: 0
TROUBLE SWALLOWING: 0

## 2025-04-06 ASSESSMENT — COGNITIVE AND FUNCTIONAL STATUS - GENERAL
MOBILITY SCORE: 24
DAILY ACTIVITIY SCORE: 24
MOBILITY SCORE: 24
DAILY ACTIVITIY SCORE: 24

## 2025-04-06 ASSESSMENT — PAIN - FUNCTIONAL ASSESSMENT
PAIN_FUNCTIONAL_ASSESSMENT: 0-10

## 2025-04-06 ASSESSMENT — PAIN SCALES - GENERAL
PAINLEVEL_OUTOF10: 8
PAINLEVEL_OUTOF10: 0 - NO PAIN
PAINLEVEL_OUTOF10: 6
PAINLEVEL_OUTOF10: 6
PAINLEVEL_OUTOF10: 8
PAINLEVEL_OUTOF10: 3
PAINLEVEL_OUTOF10: 4
PAINLEVEL_OUTOF10: 1
PAINLEVEL_OUTOF10: 8
PAINLEVEL_OUTOF10: 7
PAINLEVEL_OUTOF10: 3

## 2025-04-06 ASSESSMENT — PAIN DESCRIPTION - DESCRIPTORS
DESCRIPTORS: THROBBING
DESCRIPTORS: ACHING;THROBBING
DESCRIPTORS: THROBBING

## 2025-04-06 ASSESSMENT — PAIN DESCRIPTION - LOCATION
LOCATION: TOE (COMMENT WHICH ONE)

## 2025-04-06 ASSESSMENT — PAIN DESCRIPTION - ORIENTATION
ORIENTATION: LEFT
ORIENTATION: LEFT

## 2025-04-06 NOTE — CARE PLAN
The patient's goals for the shift include      The clinical goals for the shift include rest    Have better pain control and rest.

## 2025-04-06 NOTE — PROGRESS NOTES
Vancomycin Dosing by Pharmacy- FOLLOW UP    Connor Farr is a 59 y.o. year old male who Pharmacy has been consulted for vancomycin dosing for osteomyelitis/septic arthritis. Based on the patient's indication and renal status this patient is being dosed based on a goal AUC of 400-600.     Renal function is currently stable.    Current vancomycin dose: 1500 mg given every 12 hours    Estimated vancomycin AUC on current dose: 447 mg/L.hr     Visit Vitals  /76 (BP Location: Right arm, Patient Position: Lying)   Pulse 77   Temp 36.6 °C (97.9 °F) (Oral)   Resp 17        Lab Results   Component Value Date    CREATININE 0.59 2025    CREATININE 0.54 2025    CREATININE 0.65 2025    CREATININE 0.86 2025        Patient weight is as follows:   Vitals:    25 0301   Weight: 108 kg (238 lb 12.1 oz)       Cultures:  No results found for the encounter in last 14 days.       I/O last 3 completed shifts:  In: 1720 (15.9 mL/kg) [P.O.:120; IV Piggyback:1600]  Out: - (0 mL/kg)   Weight: 108.3 kg   I/O during current shift:  I/O this shift:  In: 200 [IV Piggyback:200]  Out: -     Temp (24hrs), Av.7 °C (98.1 °F), Min:36.6 °C (97.9 °F), Max:36.8 °C (98.2 °F)      Assessment/Plan    Within goal AUC range. Continue current vancomycin regimen.    This dosing regimen is predicted by [employer] to result in the following pharmacokinetic parameters:    Regimen: 1500 mg IV every 12 hours.  Start time: 08:51 on 2025  Exposure target: AUC24 (range)400-600 mg/L.hr   RXT60-32: 445 mg/L.hr  AUC24,ss: 447 mg/L.hr  Probability of AUC24 > 400: 86 %  Ctrough,ss: 10.8 mg/L  Probability of Ctrough,ss > 20: 0 %    The next level will be obtained on  at 0500. May be obtained sooner if clinically indicated.   Will continue to monitor renal function daily while on vancomycin and order serum creatinine at least every 48 hours if not already ordered.  Follow for continued vancomycin needs, clinical response, and  signs/symptoms of toxicity.       Julio Trent, [facility]

## 2025-04-06 NOTE — PROGRESS NOTES
Connor Farr is a 59 y.o. male on day 3 of admission presenting with Osteomyelitis of left foot (Multi).      Subjective  Patient no distress on exam  Awaiting podiatry input  Discussed plan for angiogram of the left leg tomorrow morning at 8 AM      Objective        Last Recorded Vitals      4/4/2025     7:57 PM 4/5/2025    12:33 AM 4/5/2025     5:00 AM 4/5/2025     5:00 PM 4/5/2025     8:50 PM 4/6/2025    12:18 AM 4/6/2025     7:57 AM   Vitals   Systolic 123 112 131 139 126 122 122   Diastolic 63 72 65 80 81 76 76   BP Location Right arm Right arm Right arm Right arm Right arm Right arm Right arm   Heart Rate 74 64 66 78 72 77 62   Temp 36.6 °C (97.9 °F) 36.5 °C (97.7 °F) 36.6 °C (97.9 °F) 36.7 °C (98.1 °F) 36.8 °C (98.2 °F) 36.6 °C (97.9 °F) 37 °C (98.6 °F)   Resp 16 17 18 18 16 17 18       Imaging  Imaging  CT abdomen and pelvis w oral contrast only    Result Date: 4/5/2025  Acute uncomplicated sigmoid diverticulitis.   MACRO: None   Signed by: Asiya Osborne 4/5/2025 8:23 AM Dictation workstation:   [id number]     Cardiology, Vascular, and Other Imaging  No other imaging results found for the past 2 days        Relevant Results  Results for orders placed or performed during the hospital encounter of 04/03/25 (from the past 24 hours)   Vancomycin   Result Value Ref Range    Vancomycin 12.8 5.0 - 20.0 ug/mL   Creatinine, Serum   Result Value Ref Range    Creatinine 0.59 0.50 - 1.30 mg/dL    eGFR >90 >60 mL/min/1.73m*2   Lavender Top   Result Value Ref Range    Extra Tube Hold for add-ons.      *Note: Due to a large number of results and/or encounters for the requested time period, some results have not been displayed. A complete set of results can be found in Results Review.       Physical Exam  Constitutional:  Alert and oriented to person, place, date/time in no acute distress.  HEENT:  Atraumatic, normocephalic. PERRL. EOMI.  Nares patent.  Mucous membranes moist.  .   Neck:  Trachea midline.  Respiratory:   Clear to auscultation.  Cardiac:  Regular rate and rhythm.  No murmurs.  Cardiovascular: +1 edema of the left leg.  Pulse exam: Right femoral pulse not palpable.  Left femoral pulse palpable.  Left PT multiphasic, DP biphasic.  Abdominal:  Soft, nontender, nondistended, bowel sounds present.  Musculoskeletal:  Moves extremities freely.  Left foot tenderness with palpation.  Dermatological: Left hallux with gangrene, dry, no malodor, resolving erythema present of the left foot.   Extremely tender upon palpation.  Increased calor present  Neurological: Alert and oriented to person, place, date/time  Psych:  Calm, cooperative           Assessment/Plan  PAD -left limb subacute limb ischemia  Left foot cellulitis  Left foot hallux gangrene     59-year-old male with left hallux discoloration for approximately 3 weeks.  Underwent vascular study in office on 3/28 with a left YONG of 0.7 dropping to 0.3 with exercise and right 0.8 dropping to 0.4 with exercise.  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 Severe stenosis at the origin of the right profundus with distal reconstitution. Superficial femoral artery is patent. Two-vessel runoff to the ankle as above. 2. Left internal iliac artery occlusion with distal reconstitution. Left superficial femoral artery occluded proximally with distal reconstitution. Popliteal artery and distal tibial vessels patent with three-vessel runoff.       - X-rays with concern of early osteomyelitis of the tuft of the distal phalanx  - ID on consult, on IV antibiotics  - Cultures pending  - On statin  Plan for aortogram with bilateral iliac intervention on Monday, April 7 by Dr. Oro at 8 AM.  Will follow cultures  -Continue aspirin 81 mg daily  -Case request placed  - N.p.o. order placed  - Podiatry on consult, please hold off on surgical intervention until cleared by our service unless there is  concern of wet gangrene or sepsis  -Will need Plavix added after procedure

## 2025-04-06 NOTE — PROGRESS NOTES
Connor Farr is a 59 y.o. male on day 3 of admission presenting with Osteomyelitis of left foot (Multi).      Subjective   Comfortable still in pain but taking meds       Objective     Last Recorded Vitals  /76 (BP Location: Right arm, Patient Position: Lying)   Pulse 62   Temp 37 °C (98.6 °F) (Oral)   Resp 18   Wt 108 kg (238 lb 12.1 oz)   SpO2 97%   Intake/Output last 3 Shifts:    Intake/Output Summary (Last 24 hours) at 4/6/2025 0846  Last data filed at 4/6/2025 0717  Gross per 24 hour   Intake 820 ml   Output --   Net 820 ml       Admission Weight  Weight: 109 kg (241 lb) (04/03/25 1630)    Daily Weight  04/04/25 : 108 kg (238 lb 12.1 oz)    Image Results  CT abdomen and pelvis w oral contrast only  Narrative: Interpreted By:  Asiya Osborne,   STUDY:  CT ABDOMEN AND PELVIS W ORAL CONTRAST ONLY;  4/4/2025 6:39 pm      INDICATION:  Signs/Symptoms:LLQ abdominal pain.      COMPARISON:  CT abdomen pelvis 08/29/2023      ACCESSION NUMBER(S):  [id number]      ORDERING CLINICIAN:  SUELLEN STAFF      TECHNIQUE:  Axial CT of the abdomen and pelvis was performed without intravenous  contrast with coronal and sagittal reconstructions.      FINDINGS:  Lower chest: Mild bilateral atelectasis dependent atelectasis without  focal consolidation or pleural effusion.      Liver: Stable hepatomegaly measuring 19 cm in craniocaudal dimension.  Hepatic steatosis. No mass.      Biliary: No intrahepatic or extrahepatic bile duct dilation. No  cholelithiasis.      Spleen: Unremarkable.      Pancreas: Unremarkable.      Adrenals: Unremarkable.      Kidneys: Unremarkable.      GI tract: There is sigmoid diverticulosis with wall thickening in the  proximal segment and moderate fat stranding surrounding a  diverticulum consistent with acute diverticulitis (series 4, image  118). No adjacent organized fluid collection or free air is  visualized. The remaining colon, appendix, small bowel and stomach  are unremarkable. A small  hiatal hernia is noted.      Lymph nodes: Unremarkable.      Mesentery/peritoneum: No free fluid, free air or fluid collection.      Vasculature: Mild abdominal aortic atherosclerotic calcifications  without aneurysmal dilation.      Pelvis: No free fluid, free air or fluid collection. Mild wall  thickening along the anterior bladder dome likely reactive to the  adjacent diverticulitis.      Bones/Soft tissues: Bilateral fat containing inguinal hernias,  moderate-sized on the right and small on the left. Tiny fat  containing umbilical hernia. Multilevel degenerative disc disease.      Impression: Acute uncomplicated sigmoid diverticulitis.      MACRO:  None      Signed by: Asiya Osborne 4/5/2025 8:23 AM  Dictation workstation:   [id number]      Physical Exam/necrotic left big toe    Relevant Results               Assessment/Plan                  Assessment & Plan  Osteomyelitis of left foot (Multi)  -worsening erythema, edema and pain over last few weeks despite augmentin  -admitted from vascular office today  -XR suggesting small area of osteomyelitis in L great toe    Plan:  -control pain: prn toradol (5 days max) and oxycodone as well as dilaudid available for breakthrough pain. Will need to continue to evaluate pain management plan  -empiric abx with vancomycin and zosyn. ID consulted  -podiatry and vascular surgery consult      Cough  -x6 weeks  -rhonchi BUL on exam  -CXR  PAD (peripheral artery disease) (CMS-HCC)    Necrotic big left tue, for aortogram on Monday              Katiuska Esparza MD

## 2025-04-06 NOTE — CONSULTS
Consults    Reason For Consult  Diverticulitis    History Of Present Illness  Connor Farr is a 59 y.o. male presenting with toe necrosis, pending vascular intervention. Patient reported LLQ Friday. CT a/p showing uncomplicated sigmoid diverticulitis. Patient reports recurrent sigmoid diverticulitis. Pain has resolved today. Denies bloody or black stools. Denies fevers, chills. Last colonoscopy over 10 years ago      Past Medical History  He has a past medical history of Acute sinusitis, unspecified (08/29/2013), Body mass index (BMI) 27.0-27.9, adult, Fatty liver, High cholesterol, Hypertension, Personal history of other diseases of the respiratory system (11/26/2013), and Personal history of other specified conditions (08/01/2013).    Surgical History  He has no past surgical history on file.     Social History  He reports that he has been smoking cigarettes. He has never used smokeless tobacco. He reports that he does not drink alcohol and does not use drugs.    Family History  Family History   Problem Relation Name Age of Onset    Other (MTHFR mutation) Sister      No Known Problems Daughter      No Known Problems Daughter      No Known Problems Son      Hypertension Other FHx         essential    Heart disease Other FHx     Diabetes type II Other FHx         Allergies  Cephalexin    Review of Systems   Constitutional:  Negative for chills, fever and unexpected weight change.   HENT:  Negative for congestion and trouble swallowing.    Respiratory:  Negative for cough, shortness of breath and wheezing.    Cardiovascular:  Negative for chest pain.   Gastrointestinal:  Negative for abdominal distention, abdominal pain, blood in stool, constipation, diarrhea, nausea and vomiting.   Genitourinary:  Negative for difficulty urinating.   Musculoskeletal:  Positive for joint swelling. Negative for arthralgias.   Skin:  Negative for color change.   Neurological:  Negative for dizziness, speech difficulty,  light-headedness and headaches.   Psychiatric/Behavioral:  Negative for confusion and sleep disturbance.         Physical Exam  Vitals reviewed.   Constitutional:       General: He is awake.      Appearance: Normal appearance.   HENT:      Head: Normocephalic and atraumatic.      Mouth/Throat:      Mouth: Mucous membranes are moist.   Cardiovascular:      Rate and Rhythm: Normal rate and regular rhythm.   Pulmonary:      Effort: Pulmonary effort is normal.      Breath sounds: Normal breath sounds.   Abdominal:      General: There is no distension.      Palpations: Abdomen is soft.      Tenderness: There is no abdominal tenderness. There is no guarding.   Musculoskeletal:      Cervical back: Normal range of motion and neck supple.   Skin:     General: Skin is warm and dry.   Neurological:      General: No focal deficit present.      Mental Status: He is alert and oriented to person, place, and time. Mental status is at baseline.   Psychiatric:         Attention and Perception: Attention and perception normal.         Mood and Affect: Mood normal.         Behavior: Behavior normal.          Last Recorded Vitals  Blood pressure 122/76, pulse 62, temperature 37 °C (98.6 °F), temperature source Oral, resp. rate 18, height 1.829 m (6'), weight 108 kg (238 lb 12.1 oz), SpO2 97%.    Relevant Results  Results for orders placed or performed during the hospital encounter of 04/03/25 (from the past 24 hours)   Vancomycin   Result Value Ref Range    Vancomycin 12.8 5.0 - 20.0 ug/mL   Creatinine, Serum   Result Value Ref Range    Creatinine 0.59 0.50 - 1.30 mg/dL    eGFR >90 >60 mL/min/1.73m*2   Lavender Top   Result Value Ref Range    Extra Tube Hold for add-ons.      CT abdomen and pelvis w oral contrast only    Result Date: 4/5/2025  Interpreted By:  Asiya Osborne, STUDY: CT ABDOMEN AND PELVIS W ORAL CONTRAST ONLY;  4/4/2025 6:39 pm   INDICATION: Signs/Symptoms:LLQ abdominal pain.   COMPARISON: CT abdomen pelvis 08/29/2023    ACCESSION NUMBER(S): [id number]   ORDERING CLINICIAN: SUELLEN AWAN   TECHNIQUE: Axial CT of the abdomen and pelvis was performed without intravenous contrast with coronal and sagittal reconstructions.   FINDINGS: Lower chest: Mild bilateral atelectasis dependent atelectasis without focal consolidation or pleural effusion.   Liver: Stable hepatomegaly measuring 19 cm in craniocaudal dimension. Hepatic steatosis. No mass.   Biliary: No intrahepatic or extrahepatic bile duct dilation. No cholelithiasis.   Spleen: Unremarkable.   Pancreas: Unremarkable.   Adrenals: Unremarkable.   Kidneys: Unremarkable.   GI tract: There is sigmoid diverticulosis with wall thickening in the proximal segment and moderate fat stranding surrounding a diverticulum consistent with acute diverticulitis (series 4, image 118). No adjacent organized fluid collection or free air is visualized. The remaining colon, appendix, small bowel and stomach are unremarkable. A small hiatal hernia is noted.   Lymph nodes: Unremarkable.   Mesentery/peritoneum: No free fluid, free air or fluid collection.   Vasculature: Mild abdominal aortic atherosclerotic calcifications without aneurysmal dilation.   Pelvis: No free fluid, free air or fluid collection. Mild wall thickening along the anterior bladder dome likely reactive to the adjacent diverticulitis.   Bones/Soft tissues: Bilateral fat containing inguinal hernias, moderate-sized on the right and small on the left. Tiny fat containing umbilical hernia. Multilevel degenerative disc disease.       Acute uncomplicated sigmoid diverticulitis.   MACRO: None   Signed by: Asiya Osborne 4/5/2025 8:23 AM Dictation workstation:   [id number]    XR chest 2 views    Result Date: 4/4/2025  Interpreted By:  Marisela Dempsey, STUDY: XR CHEST 2 VIEWS 4/3/2025 9:02 pm   INDICATION: Signs/Symptoms:shortness of breath, cough x6 weeks   COMPARISON: 03/03/2025   ACCESSION NUMBER(S): [id number]   ORDERING CLINICIAN: GURDEEP  SEEMANN   TECHNIQUE: AP erect and lateral views of the chest were acquired.   FINDINGS: Normal heart, mediastinum, and lungs. A small amount of calcified plaque within the aortic arch is seen. There are degenerative changes of the thoracic spine.       No acute cardiopulmonary disease.   Signed by: Marisela Dempsey 4/4/2025 9:01 AM Dictation workstation:   [id number]    XR foot left 3+ views    Result Date: 4/3/2025  Interpreted By:  Marisela Dempsey, STUDY: XR FOOT LEFT 3+ VIEWS 4/3/2025 4:58 pm   INDICATION: Discoloration of the great toe for 2 weeks. Swelling and erythema.   COMPARISON: None available.   ACCESSION NUMBER(S): [id number]   ORDERING CLINICIAN: ESPINOZA SIMPSON   TECHNIQUE: Three views of the left foot   FINDINGS: There is soft tissue swelling of the great toe with mild narrowing of the 1st MTP joint and with mild osteophytosis about this joint. There is a focal area of osteomyelitis along the medial aspect of the tuft of the distal phalanx of the great toe suggested on the oblique view suspicious for small area of osteomyelitis at this site.   Plantar and posterior calcaneal spurs are present.       On oblique view, there is a small area of cortical destruction suggested along the medial aspect of the tuft of the distal phalanx of the great toe suggesting a small area of osteomyelitis at this site.   DJD of the 1st MTP joint.   Plantar and posterior calcaneal spurs.   Signed by: Marisela Dempsey 4/3/2025 5:07 PM Dictation workstation:   [id number]    CT angio aorta and bilateral iliofemoral runoff including without contrast if performed    Result Date: 4/1/2025  Interpreted By:  Mario Hall, STUDY: CT ANGIO AORTA AND BILATERAL ILIOFEMORAL RUN OFF INCLUDING WITHOUT CONTRAST IF PERFORMED;  3/31/2025 9:36 am   INDICATION: Signs/Symptoms:PVD   COMPARISON: 08/09/2023 CT abdomen and pelvis   ACCESSION NUMBER(S): [id number]   ORDERING CLINICIAN: DEEPA ROMAN   TECHNIQUE: Thin-section axial images of the abdomen and  pelvis in the arterial phase after intravenous administration of  75 mL Omnipaque 350. Sagittal and coronal reconstructions. 3D reconstructions were obtained at a separate workstation.   FINDINGS: Vascular:  No aortic aneurysm or dissection.   The celiac trunk, superior mesenteric artery, renal arteries and inferior mesenteric artery are patent.   Right lower extremity: Right common iliac artery is widely patent. Internal iliac artery is patent. External iliac artery is occluded at its origin without reconstitution. There is reconstitution of the common femoral artery. Severe stenosis at the origin of the profundus with distal reconstitution. Superficial femoral artery is patent throughout. Popliteal artery is patent. Proximal tibial arteries are patent. The peroneal and posterior tibial arteries are patent to the ankle. The anterior tibial artery is attenuated along the distal leg.   Left lower extremity: Left common iliac artery is patent. Internal iliac artery is occluded with some degree of distal reconstitution about the distal branches. External iliac artery is patent. Common femoral artery is patent. Profundus is patent. Superficial femoral artery is occluded proximally with distal reconstitution. Popliteal artery is patent. Three-vessel tibial runoff to the ankle.   LOWER CHEST: No acute abnormality of the lung bases. BONES: No acute osseous abnormality. ABDOMINAL WALL: Fat filled right inguinal hernia.   ABDOMEN:   LIVER: Within normal limits. BILE DUCTS: Normal caliber. GALLBLADDER: No calcified gallstones. No wall thickening. PANCREAS: Within normal limits. SPLEEN: Within normal limits. ADRENALS: Within normal limits. KIDNEYS and URETERS: Symmetric renal enhancement. No hydronephrosis.   RETROPERITONEUM: No pathologically enlarged retroperitoneal lymph nodes.   PELVIS:   REPRODUCTIVE ORGANS: No pelvic masses. BLADDER: Within normal limits.   BOWEL: No dilated bowel.  Normal appendix. PERITONEUM: No  ascites or free air, no fluid collection.       1. Occlusion of the right external iliac artery with right common femoral artery reconstitution. Severe stenosis at the origin of the right profundus with distal reconstitution. Superficial femoral artery is patent. Two-vessel runoff to the ankle as above. 2. Left internal iliac artery occlusion with distal reconstitution. Left superficial femoral artery occluded proximally with distal reconstitution. Popliteal artery and distal tibial vessels patent with three-vessel runoff. 3. No acute process within the abdomen or pelvis.   MACRO: none   Signed by: Mario Hall 4/1/2025 10:31 AM Dictation workstation:   [id number]    Vascular US PVR with exercise    Result Date: 3/28/2025           [place] [id number]            Phone [phone]  Vascular Lab Report  [facility] US PVR WITH EXERCISE Patient Name:      LEIGHANN Tompkins Physician:  [id number] Rosario Draper MD Study Date:        3/28/2025           Ordering Provider:  [id number] RAFAL GUTIERRES MRN/PID:           [id number]            Fellow: Accession#:        [id number]        Technologist:       Gabi Mcfadden RVJOSE Date of Birth/Age: 1965 / 59     Technologist 2:                    years Gender:            M                   Encounter#:         [id number] Admission Status:  Outpatient          Location Performed: [facility]  Diagnosis/ICD: Atherosclerosis of native arteries of extremities with                intermittent claudication, bilateral legs-I70.213 CPT Codes:     07573 Peripheral artery PVR with exercise  Pertinent History: Purple discoloration of all left toes.  CONCLUSIONS: Right Lower PVR: There is evidence of mild multi vessel disease. Hemodynamics are further compromised in the right lower extremity with exercise. Decreased digital perfusion noted. Monophasic flow is noted in the right  common femoral artery, right popliteal artery, right posterior tibial artery and right dorsalis pedis artery. Left Lower PVR: There is evidence of mild multi-vessel disease. Hemodynamics are further compromised in the left lower extremity with exercise. Decreased digital perfusion noted. Monophasic flow is noted in the left common femoral artery, left popliteal artery and left dorsalis pedis artery. Left posterior tibial artery is not audible. Exercise:Exercise completed at 10% grade and 2 miles per hour for 2 minutes. The patient experienced pain in the right calf and the left calf at 1.5 minutes. The exam was terminated due to extremity pain.  Imaging & Doppler Findings:  RIGHT Lower PVR                Pressures Ratios Right High Thigh               142 mmHg  1.10 Right Low Thigh                125 mmHg  0.97 Right Calf                     104 mmHg  0.81 Right Posterior Tibial (Ankle) 105 mmHg  0.81 Right Dorsalis Pedis (Ankle)   109 mmHg  0.84 Right Digit (Great Toe)        62 mmHg   0.48  Right Ankle Post Exercise      64 mmHg   0.40 5 Minute Pressure              90 mmHg   0.64  LEFT Lower PVR              Pressures Ratios Left High Thigh             135 mmHg  1.05 Left Low Thigh              111 mmHg  0.86 Left Calf                   96 mmHg   0.74 Left Dorsalis Pedis (Ankle) 91 mmHg   0.71 Left Digit (Great Toe)      41 mmHg   0.32  Left Ankle Post Exercise    53 mmHg   0.33 5 Minute Pressure           54 mmHg   0.38                     Right     Left Brachial Pressure 121 mmHg 129 mmHg   [id number] Rosario Draper MD Electronically signed by [id number] Rosario Draper MD on 3/28/2025 at 12:09:30 PM  ** Final **         Assessment/Plan     Abnormal CT, Diverticulitis, PVD   CT a/p showing sigmoid diverticulitis without abscess or free air. Pain has resolved today. He is not having diarrhea or bloody stools    -Continue IV Zosyn. He will need oral antibiotic coverage for diverticulitis plus extremity infections on  discharge.    -Recommend outpatient colonoscopy in 6-8 weeks once acute inflammation improves     At this time, pain has resolved completely. Tolerating diet. GI will sign off at this time. Please call us with questions or concerns     I spent 30 minutes in the professional and overall care of this patient.

## 2025-04-07 LAB
ACT BLD: 261 SEC (ref 89–169)
ACT BLD: 326 SEC (ref 89–169)
ACT BLD: 82 SEC (ref 89–169)
ACT BLD: >400 SEC (ref 89–169)
ANION GAP SERPL CALCULATED.3IONS-SCNC: 12 MMOL/L (ref 10–20)
APTT PPP: 25.8 SECONDS (ref 22–32.5)
APTT PPP: >139 SECONDS (ref 22–32.5)
BUN SERPL-MCNC: 5 MG/DL (ref 6–23)
CALCIUM SERPL-MCNC: 8.6 MG/DL (ref 8.6–10.3)
CHLORIDE SERPL-SCNC: 105 MMOL/L (ref 98–107)
CO2 SERPL-SCNC: 23 MMOL/L (ref 21–32)
CREAT SERPL-MCNC: 0.6 MG/DL (ref 0.5–1.3)
EGFRCR SERPLBLD CKD-EPI 2021: >90 ML/MIN/1.73M*2
ERYTHROCYTE [DISTWIDTH] IN BLOOD BY AUTOMATED COUNT: 13.1 % (ref 11.5–14.5)
GLUCOSE SERPL-MCNC: 114 MG/DL (ref 74–99)
HCT VFR BLD AUTO: 37.5 % (ref 41–52)
HGB BLD-MCNC: 12.7 G/DL (ref 13.5–17.5)
MCH RBC QN AUTO: 31.4 PG (ref 26–34)
MCHC RBC AUTO-ENTMCNC: 33.9 G/DL (ref 32–36)
MCV RBC AUTO: 93 FL (ref 80–100)
NRBC BLD-RTO: 0 /100 WBCS (ref 0–0)
PLATELET # BLD AUTO: 240 X10*3/UL (ref 150–450)
PLATELET # BLD AUTO: 242 X10*3/UL (ref 150–450)
POTASSIUM SERPL-SCNC: 3.9 MMOL/L (ref 3.5–5.3)
RBC # BLD AUTO: 4.05 X10*6/UL (ref 4.5–5.9)
SODIUM SERPL-SCNC: 136 MMOL/L (ref 136–145)
WBC # BLD AUTO: 8.8 X10*3/UL (ref 4.4–11.3)

## 2025-04-07 PROCEDURE — C1887 CATHETER, GUIDING: HCPCS | Performed by: SURGERY

## 2025-04-07 PROCEDURE — 75630 X-RAY AORTA LEG ARTERIES: CPT | Performed by: SURGERY

## 2025-04-07 PROCEDURE — 2500000001 HC RX 250 WO HCPCS SELF ADMINISTERED DRUGS (ALT 637 FOR MEDICARE OP): Performed by: INTERNAL MEDICINE

## 2025-04-07 PROCEDURE — 047H34Z DILATION OF RIGHT EXTERNAL ILIAC ARTERY WITH DRUG-ELUTING INTRALUMINAL DEVICE, PERCUTANEOUS APPROACH: ICD-10-PCS | Performed by: SURGERY

## 2025-04-07 PROCEDURE — C1769 GUIDE WIRE: HCPCS | Performed by: SURGERY

## 2025-04-07 PROCEDURE — 37226 HC REVASCULARIZE FEM/POP ARTERY,ANGIOPLASTY/STENT: CPT | Mod: RT | Performed by: SURGERY

## 2025-04-07 PROCEDURE — 36415 COLL VENOUS BLD VENIPUNCTURE: CPT | Performed by: NURSE PRACTITIONER

## 2025-04-07 PROCEDURE — C1751 CATH, INF, PER/CENT/MIDLINE: HCPCS | Performed by: SURGERY

## 2025-04-07 PROCEDURE — 76937 US GUIDE VASCULAR ACCESS: CPT | Performed by: SURGERY

## 2025-04-07 PROCEDURE — 37184 PRIM ART M-THRMBC 1ST VSL: CPT | Performed by: SURGERY

## 2025-04-07 PROCEDURE — 047N34Z DILATION OF LEFT POPLITEAL ARTERY WITH DRUG-ELUTING INTRALUMINAL DEVICE, PERCUTANEOUS APPROACH: ICD-10-PCS | Performed by: SURGERY

## 2025-04-07 PROCEDURE — C1893 INTRO/SHEATH, FIXED,NON-PEEL: HCPCS | Performed by: SURGERY

## 2025-04-07 PROCEDURE — 2720000007 HC OR 272 NO HCPCS: Performed by: SURGERY

## 2025-04-07 PROCEDURE — C1894 INTRO/SHEATH, NON-LASER: HCPCS | Performed by: SURGERY

## 2025-04-07 PROCEDURE — 2500000004 HC RX 250 GENERAL PHARMACY W/ HCPCS (ALT 636 FOR OP/ED): Performed by: SURGERY

## 2025-04-07 PROCEDURE — 2500000004 HC RX 250 GENERAL PHARMACY W/ HCPCS (ALT 636 FOR OP/ED): Performed by: NURSE PRACTITIONER

## 2025-04-07 PROCEDURE — C1757 CATH, THROMBECTOMY/EMBOLECT: HCPCS | Performed by: SURGERY

## 2025-04-07 PROCEDURE — 37228 PR REVSC OPN/PRQ TIB/PERO W/ANGIOPLASTY UNI: CPT | Performed by: SURGERY

## 2025-04-07 PROCEDURE — 2780000003 HC OR 278 NO HCPCS: Performed by: SURGERY

## 2025-04-07 PROCEDURE — 37226 PR REVSC OPN/PRQ FEM/POP W/STNT/ANGIOP SM VSL: CPT | Performed by: SURGERY

## 2025-04-07 PROCEDURE — C1876 STENT, NON-COA/NON-COV W/DEL: HCPCS | Performed by: SURGERY

## 2025-04-07 PROCEDURE — 85730 THROMBOPLASTIN TIME PARTIAL: CPT | Performed by: NURSE PRACTITIONER

## 2025-04-07 PROCEDURE — 04CU3ZZ EXTIRPATION OF MATTER FROM LEFT PERONEAL ARTERY, PERCUTANEOUS APPROACH: ICD-10-PCS | Performed by: SURGERY

## 2025-04-07 PROCEDURE — C1874 STENT, COATED/COV W/DEL SYS: HCPCS | Performed by: SURGERY

## 2025-04-07 PROCEDURE — 80048 BASIC METABOLIC PNL TOTAL CA: CPT | Performed by: INTERNAL MEDICINE

## 2025-04-07 PROCEDURE — C1725 CATH, TRANSLUMIN NON-LASER: HCPCS | Performed by: SURGERY

## 2025-04-07 PROCEDURE — 36415 COLL VENOUS BLD VENIPUNCTURE: CPT | Performed by: INTERNAL MEDICINE

## 2025-04-07 PROCEDURE — 2500000002 HC RX 250 W HCPCS SELF ADMINISTERED DRUGS (ALT 637 FOR MEDICARE OP, ALT 636 FOR OP/ED): Performed by: NURSE PRACTITIONER

## 2025-04-07 PROCEDURE — 75716 ARTERY X-RAYS ARMS/LEGS: CPT | Performed by: SURGERY

## 2025-04-07 PROCEDURE — 2500000005 HC RX 250 GENERAL PHARMACY W/O HCPCS: Performed by: SURGERY

## 2025-04-07 PROCEDURE — 2550000001 HC RX 255 CONTRASTS: Performed by: SURGERY

## 2025-04-07 PROCEDURE — 047L3DZ DILATION OF LEFT FEMORAL ARTERY WITH INTRALUMINAL DEVICE, PERCUTANEOUS APPROACH: ICD-10-PCS | Performed by: SURGERY

## 2025-04-07 PROCEDURE — 2500000001 HC RX 250 WO HCPCS SELF ADMINISTERED DRUGS (ALT 637 FOR MEDICARE OP): Performed by: NURSE PRACTITIONER

## 2025-04-07 PROCEDURE — 75825 VEIN X-RAY TRUNK: CPT | Performed by: SURGERY

## 2025-04-07 PROCEDURE — B41DYZZ FLUOROSCOPY OF AORTA AND BILATERAL LOWER EXTREMITY ARTERIES USING OTHER CONTRAST: ICD-10-PCS | Performed by: SURGERY

## 2025-04-07 PROCEDURE — 99152 MOD SED SAME PHYS/QHP 5/>YRS: CPT | Performed by: SURGERY

## 2025-04-07 PROCEDURE — 047U3ZZ DILATION OF LEFT PERONEAL ARTERY, PERCUTANEOUS APPROACH: ICD-10-PCS | Performed by: SURGERY

## 2025-04-07 PROCEDURE — 2500000004 HC RX 250 GENERAL PHARMACY W/ HCPCS (ALT 636 FOR OP/ED): Performed by: INTERNAL MEDICINE

## 2025-04-07 PROCEDURE — 2060000001 HC INTERMEDIATE ICU ROOM DAILY

## 2025-04-07 PROCEDURE — 85027 COMPLETE CBC AUTOMATED: CPT | Performed by: INTERNAL MEDICINE

## 2025-04-07 PROCEDURE — 36247 INS CATH ABD/L-EXT ART 3RD: CPT | Performed by: SURGERY

## 2025-04-07 PROCEDURE — C1760 CLOSURE DEV, VASC: HCPCS | Performed by: SURGERY

## 2025-04-07 PROCEDURE — 99153 MOD SED SAME PHYS/QHP EA: CPT | Performed by: SURGERY

## 2025-04-07 PROCEDURE — 85347 COAGULATION TIME ACTIVATED: CPT

## 2025-04-07 PROCEDURE — 047K34Z DILATION OF RIGHT FEMORAL ARTERY WITH DRUG-ELUTING INTRALUMINAL DEVICE, PERCUTANEOUS APPROACH: ICD-10-PCS | Performed by: SURGERY

## 2025-04-07 PROCEDURE — S4991 NICOTINE PATCH NONLEGEND: HCPCS | Performed by: INTERNAL MEDICINE

## 2025-04-07 PROCEDURE — C1773 RET DEV, INSERTABLE: HCPCS | Performed by: SURGERY

## 2025-04-07 PROCEDURE — 2500000005 HC RX 250 GENERAL PHARMACY W/O HCPCS: Performed by: NURSE PRACTITIONER

## 2025-04-07 PROCEDURE — 2500000002 HC RX 250 W HCPCS SELF ADMINISTERED DRUGS (ALT 637 FOR MEDICARE OP, ALT 636 FOR OP/ED): Performed by: INTERNAL MEDICINE

## 2025-04-07 PROCEDURE — 99233 SBSQ HOSP IP/OBS HIGH 50: CPT | Performed by: INTERNAL MEDICINE

## 2025-04-07 PROCEDURE — 85049 AUTOMATED PLATELET COUNT: CPT | Performed by: NURSE PRACTITIONER

## 2025-04-07 PROCEDURE — 37228 HC REVASCULARIZE TIBIAL/PERON ARTERY,ANGIOPLASTY INITIAL: CPT | Performed by: SURGERY

## 2025-04-07 PROCEDURE — 75710 ARTERY X-RAYS ARM/LEG: CPT | Performed by: SURGERY

## 2025-04-07 DEVICE — OMNILINK ELITE VASCULAR BALLOON-EXPANDABLE STENT SYSTEM 8.0 MM X 59 MM X 80 CM OVER-THE-WIRE
Type: IMPLANTABLE DEVICE | Site: LEG | Status: FUNCTIONAL
Brand: OMNILINK ELITE

## 2025-04-07 DEVICE — BX2 HEP REDUCED PROFILE BX2 7MMX39MM 6FR 135CM CATH
Type: IMPLANTABLE DEVICE | Site: LEG | Status: FUNCTIONAL
Brand: GORE VIABAHN VBX BALLOON EXPANDABLE ENDO

## 2025-04-07 DEVICE — VIABAHN SX ENDO HEPARIN 18 RO 7MMX10CM 7FR120CM CATH
Type: IMPLANTABLE DEVICE | Site: LEG | Status: FUNCTIONAL
Brand: GORE VIABAHN ENDOPROSTHESIS WITH HEPARIN

## 2025-04-07 DEVICE — ABSOLUTE PRO VASCULAR SELF-EXPANDING STENT SYSTEM 8.0 MM X 100 MM X 80 CM / OVER-THE-WIRE
Type: IMPLANTABLE DEVICE | Site: LEG | Status: FUNCTIONAL
Brand: ABSOLUTE PRO

## 2025-04-07 DEVICE — DRUG-ELUTING VASCULAR STENT SYSTEM
Type: IMPLANTABLE DEVICE | Site: LEG | Status: FUNCTIONAL
Brand: ELUVIA™

## 2025-04-07 DEVICE — ABSOLUTE PRO VASCULAR SELF-EXPANDING STENT SYSTEM 8.0 MM X 80 MM X 80 CM / OVER-THE-WIRE
Type: IMPLANTABLE DEVICE | Site: LEG | Status: FUNCTIONAL
Brand: ABSOLUTE PRO

## 2025-04-07 RX ORDER — PROTAMINE SULFATE 10 MG/ML
INJECTION, SOLUTION INTRAVENOUS CONTINUOUS PRN
Status: COMPLETED | OUTPATIENT
Start: 2025-04-07 | End: 2025-04-07

## 2025-04-07 RX ORDER — LIDOCAINE HYDROCHLORIDE 10 MG/ML
INJECTION, SOLUTION EPIDURAL; INFILTRATION; INTRACAUDAL; PERINEURAL AS NEEDED
Status: DISCONTINUED | OUTPATIENT
Start: 2025-04-07 | End: 2025-04-07 | Stop reason: HOSPADM

## 2025-04-07 RX ORDER — HEPARIN SODIUM 5000 [USP'U]/ML
4000 INJECTION, SOLUTION INTRAVENOUS; SUBCUTANEOUS ONCE
Status: COMPLETED | OUTPATIENT
Start: 2025-04-07 | End: 2025-04-07

## 2025-04-07 RX ORDER — MIDAZOLAM HYDROCHLORIDE 1 MG/ML
INJECTION, SOLUTION INTRAMUSCULAR; INTRAVENOUS AS NEEDED
Status: DISCONTINUED | OUTPATIENT
Start: 2025-04-07 | End: 2025-04-07 | Stop reason: HOSPADM

## 2025-04-07 RX ORDER — HEPARIN SODIUM 1000 [USP'U]/ML
INJECTION, SOLUTION INTRAVENOUS; SUBCUTANEOUS AS NEEDED
Status: DISCONTINUED | OUTPATIENT
Start: 2025-04-07 | End: 2025-04-07 | Stop reason: HOSPADM

## 2025-04-07 RX ORDER — HEPARIN SODIUM 5000 [USP'U]/ML
2000-4000 INJECTION, SOLUTION INTRAVENOUS; SUBCUTANEOUS AS NEEDED
Status: DISCONTINUED | OUTPATIENT
Start: 2025-04-07 | End: 2025-04-09

## 2025-04-07 RX ORDER — NITROGLYCERIN 40 MG/100ML
INJECTION INTRAVENOUS AS NEEDED
Status: DISCONTINUED | OUTPATIENT
Start: 2025-04-07 | End: 2025-04-07 | Stop reason: HOSPADM

## 2025-04-07 RX ORDER — IODIXANOL 270 MG/ML
INJECTION, SOLUTION INTRAVASCULAR AS NEEDED
Status: DISCONTINUED | OUTPATIENT
Start: 2025-04-07 | End: 2025-04-07 | Stop reason: HOSPADM

## 2025-04-07 RX ORDER — HEPARIN SODIUM 10000 [USP'U]/100ML
0-4000 INJECTION, SOLUTION INTRAVENOUS CONTINUOUS
Status: DISCONTINUED | OUTPATIENT
Start: 2025-04-07 | End: 2025-04-09

## 2025-04-07 RX ORDER — VERAPAMIL HYDROCHLORIDE 2.5 MG/ML
INJECTION, SOLUTION INTRAVENOUS AS NEEDED
Status: DISCONTINUED | OUTPATIENT
Start: 2025-04-07 | End: 2025-04-07 | Stop reason: HOSPADM

## 2025-04-07 RX ORDER — FENTANYL CITRATE 50 UG/ML
INJECTION, SOLUTION INTRAMUSCULAR; INTRAVENOUS AS NEEDED
Status: DISCONTINUED | OUTPATIENT
Start: 2025-04-07 | End: 2025-04-07 | Stop reason: HOSPADM

## 2025-04-07 RX ADMIN — OXYCODONE HYDROCHLORIDE AND ACETAMINOPHEN 1 TABLET: 5; 325 TABLET ORAL at 18:32

## 2025-04-07 RX ADMIN — PIPERACILLIN SODIUM AND TAZOBACTAM SODIUM 4.5 G: 4; .5 INJECTION, SOLUTION INTRAVENOUS at 00:06

## 2025-04-07 RX ADMIN — LOSARTAN POTASSIUM 100 MG: 100 TABLET, FILM COATED ORAL at 20:49

## 2025-04-07 RX ADMIN — OXYCODONE HYDROCHLORIDE AND ACETAMINOPHEN 1 TABLET: 5; 325 TABLET ORAL at 06:29

## 2025-04-07 RX ADMIN — ASPIRIN 81 MG: 81 TABLET, COATED ORAL at 18:32

## 2025-04-07 RX ADMIN — PANTOPRAZOLE SODIUM 20 MG: 20 TABLET, DELAYED RELEASE ORAL at 06:30

## 2025-04-07 RX ADMIN — PIPERACILLIN SODIUM AND TAZOBACTAM SODIUM 4.5 G: 4; .5 INJECTION, SOLUTION INTRAVENOUS at 18:55

## 2025-04-07 RX ADMIN — LEVOTHYROXINE SODIUM 75 MCG: 0.07 TABLET ORAL at 20:50

## 2025-04-07 RX ADMIN — HEPARIN SODIUM 1000 UNITS/HR: 10000 INJECTION, SOLUTION INTRAVENOUS at 18:47

## 2025-04-07 RX ADMIN — PIPERACILLIN SODIUM AND TAZOBACTAM SODIUM 4.5 G: 4; .5 INJECTION, SOLUTION INTRAVENOUS at 14:48

## 2025-04-07 RX ADMIN — NICOTINE 1 PATCH: 14 PATCH, EXTENDED RELEASE TRANSDERMAL at 14:49

## 2025-04-07 RX ADMIN — METOPROLOL SUCCINATE 100 MG: 100 TABLET, EXTENDED RELEASE ORAL at 20:49

## 2025-04-07 RX ADMIN — VANCOMYCIN 1.5 G: 1.5 INJECTION, SOLUTION INTRAVENOUS at 20:49

## 2025-04-07 RX ADMIN — HYDROMORPHONE HYDROCHLORIDE 0.2 MG: 1 INJECTION, SOLUTION INTRAMUSCULAR; INTRAVENOUS; SUBCUTANEOUS at 00:06

## 2025-04-07 RX ADMIN — HEPARIN SODIUM 4000 UNITS: 5000 INJECTION, SOLUTION INTRAVENOUS; SUBCUTANEOUS at 18:49

## 2025-04-07 RX ADMIN — Medication 2 L/MIN: at 13:05

## 2025-04-07 RX ADMIN — ROSUVASTATIN CALCIUM 20 MG: 20 TABLET, FILM COATED ORAL at 20:49

## 2025-04-07 RX ADMIN — PIPERACILLIN SODIUM AND TAZOBACTAM SODIUM 4.5 G: 4; .5 INJECTION, SOLUTION INTRAVENOUS at 06:29

## 2025-04-07 ASSESSMENT — PAIN DESCRIPTION - LOCATION
LOCATION: TOE (COMMENT WHICH ONE)
LOCATION: TOE (COMMENT WHICH ONE)
LOCATION: LEG

## 2025-04-07 ASSESSMENT — COGNITIVE AND FUNCTIONAL STATUS - GENERAL
DAILY ACTIVITIY SCORE: 24
MOBILITY SCORE: 24

## 2025-04-07 ASSESSMENT — PAIN DESCRIPTION - DESCRIPTORS
DESCRIPTORS: ACHING
DESCRIPTORS: ACHING;TIGHTNESS

## 2025-04-07 ASSESSMENT — PAIN SCALES - GENERAL
PAINLEVEL_OUTOF10: 3
PAINLEVEL_OUTOF10: 4
PAINLEVEL_OUTOF10: 0 - NO PAIN
PAINLEVEL_OUTOF10: 7
PAINLEVEL_OUTOF10: 8
PAINLEVEL_OUTOF10: 0 - NO PAIN
PAINLEVEL_OUTOF10: 0 - NO PAIN

## 2025-04-07 ASSESSMENT — PAIN DESCRIPTION - ORIENTATION
ORIENTATION: LEFT

## 2025-04-07 ASSESSMENT — PAIN SCALES - WONG BAKER: WONGBAKER_NUMERICALRESPONSE: NO HURT

## 2025-04-07 ASSESSMENT — PAIN - FUNCTIONAL ASSESSMENT: PAIN_FUNCTIONAL_ASSESSMENT: 0-10

## 2025-04-07 NOTE — OP NOTE
Aortogram With Runoffs, Lower Extremity Intervention Operative Note     Date: 2025  OR Location: [facility] Cardiac Cath Lab    Name: Connor Farr, : 1965, Age: 59 y.o., MRN: [id number], Sex: male    Diagnosis  Pre-op Diagnosis      * PAD (peripheral artery disease) (CMS-HCC) [I73.9] Post-op Diagnosis     * PAD (peripheral artery disease) (CMS-HCC) [I73.9]     Procedures  Aortogram With Runoffs  91382 - CHG AORTOGRAPHY ABDOMINAL SERIALOGRAPHY RS&I    Aortogram With Runoffs  53364 - CHG AORTOGRAPHY ABDL BI ILIOFEM LOW EXTREM CATH RS&I    Aortogram With Runoffs  39905 - CHG ANGIOGRAPHY EXTREMITY UNILATERAL RS&I    Aortogram With Runoffs  40722 - CHG ANGIOGRAPHY EXTREMITY BILATERAL RS&I    Lower Extremity Intervention  54259 - FL REVASCULARIZATION ILIAC ARTERY ANGIOP 1ST VSL      Surgeons      * Krys Oro - Primary    Resident/Fellow/Other Assistant:  Surgeons and Role:  * No surgeons found with a matching role *    Staff:   Scrub Person: Katherin  Circulator: Lopez  Circulator: Rajat  Circulator: Mary  Scrub Person: Anette    Anesthesia Staff: No anesthesia staff entered.    Procedure Summary  Anesthesia: Anesthesia type not filed in the log.  ASA: ASA status not filed in the log.  Estimated Blood Loss: 10mL  Intra-op Medications:   Administrations occurring from 0755 to 1243 on 25:   Medication Name Total Dose   fentaNYL PF (Sublimaze) injection 600 mcg   midazolam (Versed) injection 7 mg   oxygen (O2) therapy 798.5 L   lidocaine PF (Xylocaine) 10 mg/mL (1 %) injection 20 mL   heparin 1,000 unit/mL injection 38,000 Units   verapamil (Isoptin) injection 2.5 mg   nitroglycerin in 5 % dextrose 10 mL syringe 600 mcg   protamine injection Cannot be calculated   iodixanol (VISIPaque) 270 mg iodine/mL injection 180 mL         Intraprocedure I/O Totals       None           Specimen: No specimens collected              Drains and/or Catheters: * None in log *    Tourniquet Times:         Implants:  Implants        Type Name Action Serial No.      Stent STENT, VASCULAR, ABSOLUTE PRO, 8 X 100 X 80 - [id number] Implanted      Other Cardiac Implant STENT, VIABAHN VBX 2.0 BALLOON, 7 X 39 135CM REDUCE PROFILE - [id number] Implanted [id number]     Stent STENT, OMNILINK ELITE, 8.0MM X 59MM X 80CM - [id number] Implanted      Stent STENT, ENDOPROSTHESIS, VIBAHN, W/MARKERS, HEPARIN, 7 X 25 X 120 - [id number] Implanted [id number]     Stent STENT GRAFT, ENDOPROSTHESIS, VIABAHN, W/MARKERS,HEPARIN, 7 X 10 X 120 - [id number] Implanted [id number]     Stent STENT, LILLIAN, 7 MM X 60 MM X 130 CM - [id number] Implanted      Stent STENT, VASCULAR, ABSOLUTE PRO, 8 X 80 X 80 - [id number] Implanted               Findings: Occluded right EIA, left SFA with AK POP reconstitution, single vessel runoff via peroneal that delivers flow to foot via collateral, PTAS of right EIA, left SFA PTAS with Viabahn, and terminal stent with J[facility]GE at [facility]. Thromboembolism noted to tibioperoneal trunk bifurcation. Suction thrombectomy with resolution. Dissection right CFA/EIA treated with stent to proximal CFA right. Occluded right PFA.     Indications: Connor Farr is an 59 y.o. 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  Risk and rationale for arteriography and intervention was discussed and patient was agreeable to proceeding.    The patient was seen in the preoperative area. The risks, benefits, complications, treatment options, non-operative alternatives, expected recovery and outcomes were discussed with the patient. The possibilities of reaction to medication, pulmonary aspiration, injury to surrounding structures, bleeding, recurrent infection, the need for additional procedures, failure to diagnose a condition, and creating a complication requiring transfusion or operation were discussed with the patient. The patient concurred with  the proposed plan, giving informed consent.  The site of surgery was properly noted/marked if necessary per policy. The patient has been actively warmed in preoperative area. Preoperative antibiotics are not indicated. Venous thrombosis prophylaxis are not indicated.    Procedure Details: Supine position, both groins prepped and draped.  The patient had a right external iliac occlusion and required treatment before safe access of the right common femoral could be achieved.  The left common femoral artery access under ultrasound guidance in the mid section of the CFA to avoid any further entanglements later in the procedure with the SFA origin which was occluded.  A 6 Maltese sheath placed.  Omni Flush catheter placed into abdominal aorta and abdominal aortogram performed confirming the findings of the CT angiogram showing occlusion of the right external iliac artery and reconstitution of right common femoral artery via pelvic collaterals from the right internal iliac artery which remain patent.  Up and over access with glide wire and Omni Flush catheter and wire positioned into a branch of the right internal iliac artery.  Wire exchanged for a Cao wire and a 6 Maltese by 45 cm sheath was placed up and over and positioned at the common iliac artery.  On the right side the common femoral was accessed.  The common femoral on the CT angiogram reconstituted via circumflex vessels and not the profundofemoral which is occluded at its origin.  Micropuncture access of the right common femoral achieved with ultrasound guidance and is on this side lidocaine 1% was used as local anesthesia as on the left side as described earlier.  Total volume of local anesthesia was 10 mL.    Micropuncture access achieved into the external iliac artery and careful manipulation of the initial access wire got the stiff part of the wire into the common femoral allow me to place the sheath into the common femoral artery.  Glidewire access into  the external iliac artery was initially very easy but this was because I crossed into a subintimal plane.  A 6 Brazilian sheath was placed.  I parked the tip of this in the common femoral artery proper and not into the subinterval plan.  Using the left access which was up and over into the common iliac I was able to get wire access into the external iliac artery and then combining visualization from below on the right and the from above was able to manipulate a Glidewire into the common femoral artery on the right side.  A Guevara cross catheter was used to push into the superficial femoral artery and confirm intraluminal positioning of the wire.  An advantage wire was then parked into the right thigh into the superficial femoral artery.  The occlusion in the right external iliac artery was treated with a 5 mm balloon after treating the patient with 12,000 units of heparin.  The patient was periodically redosed while checking activated clotting times to maintain therapeutic levels.  The initial ballooning resulted in dissected external iliac artery which required angioplasty and stenting.  The proximal external iliac artery was treated with a 7 x 39 VBX stent graft.  Going distally and 8 mm Omnilink stent was placed and then finally a 8 mm nitinol stent was placed across the inguinal ligament into the proximal common femoral artery.  These were postdilated to 8 mm.  There was a dissection noted in the common femoral after this ballooning.  I chose not to tyler it at this point because I needed to get wire access from the right side.    The Glidewire from the right access was then pushed into the true lumen of the external iliac artery and then to the aorta.  Up and over access was achieved with Omni Flush catheter.  A 7 x 45 cm then subsequently has 65 cm stent was placed after achieving wire access of the superficial femoral artery.  Pushing the sheath into the superficial femoral artery I got the wire passed mostly  intraluminally but at one point subintimal he but back into the true lumen at Cm's canal.  I confirmed this with passing a Guevara cross catheter into the above-knee popliteal artery and shooting and arteriogram.  Arteriography of the left leg showed reconstitution of the above-knee popliteal artery and visualization of a proximal anterior tibial artery which occluded in the mid leg.  The posterior tibial artery was occluded and the peroneal artery represented the primary runoff into the foot via a collateral into the dorsalis pedis artery.  This is likely the second level of occlusion that was responsible for the patient's nonhealing of his hallux.  I parked an 018 wire into the peroneal artery.  This was a V 18 x 300 cm wire.    The occluded SFA was predilated to 6 mm.  This was held for 30 seconds each inflation.  What was left was a very dissected superficial femoral artery.  I felt this was best treated with a Viabahn stent graft.  A 7 x 250 mm Viabahn stent graft was deployed from Cm's canal to the proximal thigh.  The remaining distance to the SFA origin was treated with a 7 x 100 mm Viabahn stent graft.  These were postdilated to 7 mm.  There was a remaining segment of occlusion left untreated at the distal SFA.  I did not want to cover this with a stent graft because these were the inflow sites of important collaterals.  I deployed a drug-eluting stent here into the proximal above-knee popliteal artery and postdilated to 7 mm.  Runoff arteriography showed occlusion of the tibioperoneal trunk which was not there before.  This was easily passed with a V18 wire and positioning of catheter below this a obstruction could be visualized at the bifurcation of the tibioperoneal trunk likely representing a thromboembolism.    An 014 wire was then placed into the peroneal artery.  A CAT Rx thrombectomy catheter was passed multiple times across the obstruction.  I was able to get small bits and pieces but the  occlusion persisted.  Therefore I placed a 4 Ghanaian by 100 cm sheath right above the obstruction and then placed is on manual suction and engage the thrombus.  This was then moved back into the 6 Ghanaian sheath rapidly.  Suctioning of the sideport of the 6 Ghanaian sheath was obstructive as the 4 Ghanaian sheath had no material in it when flushing.  A wire was then carefully pushed back into the superficial femoral artery and then the sheath was removed from the body and flushed retrieving a plug of thrombus shown below with some smaller material removed via the CAT Rx catheter.      The spasm is seen in the runoff was treated with a cocktail of verapamil and nitroglycerin.  Intermittent dosing of nitroglycerin was also given throughout the case.  The tibioperoneal trunk was treated with a 3.5 mm balloon the proximal peroneal artery was treated with a 3 mm balloon.  This complete resolved the occlusion seen with a thromboembolism.    The patient had a palpable dorsalis pedis pulse.  At this point the left leg revascularization was complete.  The sheath was retracted and exchanged for short 6 Ghanaian sheath and a prostyle suture deployed with hemostasis achieved in the right groin.  The wire access has been maintained across the right common femoral artery and there is disruption of the nitinol stent and dissection here.  This is likely from the sheath exchange.  It may have caught the stents.  The area was retreated with another nitinol stent of 8 mm diameter down to the mid common femoral artery.  This was postdilated to 7 mm.  There was complete resolution of the common femoral dissection seen earlier and good flow into the right leg.  Completion aortography was performed and showed patency of the aorta and both iliac systems.  The left sheath was exchanged for short 6 Ghanaian sheath and then a prostyle suture deployed with hemostasis achieved.  The patient's heparin was reversed with  protamine..                  Complications:  None; patient tolerated the procedure well.    Disposition: PACU - hemodynamically stable.  Condition: stable                 Additional Details:     Attending Attestation: I was present and scrubbed for the entire procedure.    Krys Oro  Phone Number: [phone]

## 2025-04-07 NOTE — CARE PLAN
The patient's goals for the shift include      The clinical goals for the shift include pain control    Over the shift, the patient did not make progress toward the following goals. Barriers to progression include uncontrolled pain. Recommendations to address these barriers include medicate for pain as prescribed by physician.

## 2025-04-07 NOTE — PROGRESS NOTES
Connor Farr is a 59 y.o. male on day 4 of admission presenting with Osteomyelitis of left foot (Multi).    Subjective   Interval History:   Patient seen and examined post procedure  Of note reviewed-bilateral peripheral vascular disease s/p angioplasty with stent placement  Moderate left foot pain  Afebrile, no chills  No cough, chest pain or shortness of breath  No abdominal pain  No nausea vomiting or diarrhea    Review of Systems   All other systems reviewed and are negative.      Objective   Range of Vitals (last 24 hours)  Heart Rate:  [60-61]   Temp:  [36.1 °C (97 °F)]   Resp:  [14-18]   BP: (125-140)/(82-85)   SpO2:  [96 %-99 %]   Daily Weight  04/04/25 : 108 kg (238 lb 12.1 oz)    Body mass index is 32.38 kg/m².    Physical Exam  Constitutional:       Appearance: Normal appearance.   HENT:      Head: Normocephalic and atraumatic.      Nose: Nose normal.   Eyes:      General: No scleral icterus.  Cardiovascular:      Rate and Rhythm: Normal rate and regular rhythm.   Pulmonary:      Effort: Pulmonary effort is normal.      Breath sounds: Normal breath sounds.   Abdominal:      General: Bowel sounds are normal.      Palpations: Abdomen is soft.      Tenderness: There is no abdominal tenderness.   Musculoskeletal:         General: Normal range of motion.      Cervical back: Normal range of motion and neck supple.   Skin:     General: Skin is warm and dry.      Comments: Left hallux gangrene, left foot erythema    Neurological:      General: No focal deficit present.      Mental Status: He is alert.   Psychiatric:         Mood and Affect: Mood normal.         Behavior: Behavior normal.     Antibiotics  bacitracin - 500 unit/gram  piperacillin-tazobactam - 4.5 gram/100 mL  vancomycin - 1.5 gram/300 mL    Relevant Results  Labs  Results from last 72 hours   Lab Units 04/07/25  0620   WBC AUTO x10*3/uL 8.8   HEMOGLOBIN g/dL 12.7*   HEMATOCRIT % 37.5*   PLATELETS AUTO x10*3/uL 242     Results from last 72 hours    Lab Units 04/07/25  0620 04/06/25  0607 04/05/25  0533   SODIUM mmol/L 136  --  136   POTASSIUM mmol/L 3.9  --  3.7   CHLORIDE mmol/L 105  --  105   CO2 mmol/L 23  --  24   BUN mg/dL 5*  --  6   CREATININE mg/dL 0.60 0.59 0.54   GLUCOSE mg/dL 114*  --  109*   CALCIUM mg/dL 8.6  --  8.7   ANION GAP mmol/L 12  --  11   EGFR mL/min/1.73m*2 >90 >90 >90   PHOSPHORUS mg/dL  --   --  2.9     Results from last 72 hours   Lab Units 04/05/25  0533   ALBUMIN g/dL 3.5     Estimated Creatinine Clearance: 125 mL/min (by C-G formula based on SCr of 0.6 mg/dL).  C-Reactive Protein   Date Value Ref Range Status   04/03/2025 2.50 (H) <1.00 mg/dL Final     Microbiology  Reviewed-blood cultures pending  Imaging  Invasive vascular procedure    Result Date: 4/7/2025  This study is a surgery completed in an invasive cardiovascular space. Please see OpNote on Notes tab for findings.    CT abdomen and pelvis w oral contrast only    Result Date: 4/5/2025  Interpreted By:  Asiya Osborne, STUDY: CT ABDOMEN AND PELVIS W ORAL CONTRAST ONLY;  4/4/2025 6:39 pm   INDICATION: Signs/Symptoms:LLQ abdominal pain.   COMPARISON: CT abdomen pelvis 08/29/2023   ACCESSION NUMBER(S): [id number]   ORDERING CLINICIAN: SUELLEN STAFF   TECHNIQUE: Axial CT of the abdomen and pelvis was performed without intravenous contrast with coronal and sagittal reconstructions.   FINDINGS: Lower chest: Mild bilateral atelectasis dependent atelectasis without focal consolidation or pleural effusion.   Liver: Stable hepatomegaly measuring 19 cm in craniocaudal dimension. Hepatic steatosis. No mass.   Biliary: No intrahepatic or extrahepatic bile duct dilation. No cholelithiasis.   Spleen: Unremarkable.   Pancreas: Unremarkable.   Adrenals: Unremarkable.   Kidneys: Unremarkable.   GI tract: There is sigmoid diverticulosis with wall thickening in the proximal segment and moderate fat stranding surrounding a diverticulum consistent with acute diverticulitis (series 4, image  118). No adjacent organized fluid collection or free air is visualized. The remaining colon, appendix, small bowel and stomach are unremarkable. A small hiatal hernia is noted.   Lymph nodes: Unremarkable.   Mesentery/peritoneum: No free fluid, free air or fluid collection.   Vasculature: Mild abdominal aortic atherosclerotic calcifications without aneurysmal dilation.   Pelvis: No free fluid, free air or fluid collection. Mild wall thickening along the anterior bladder dome likely reactive to the adjacent diverticulitis.   Bones/Soft tissues: Bilateral fat containing inguinal hernias, moderate-sized on the right and small on the left. Tiny fat containing umbilical hernia. Multilevel degenerative disc disease.       Acute uncomplicated sigmoid diverticulitis.   MACRO: None   Signed by: Asiya Osborne 4/5/2025 8:23 AM Dictation workstation:   [id number]    XR chest 2 views    Result Date: 4/4/2025  Interpreted By:  Marisela Dempsey, STUDY: XR CHEST 2 VIEWS 4/3/2025 9:02 pm   INDICATION: Signs/Symptoms:shortness of breath, cough x6 weeks   COMPARISON: 03/03/2025   ACCESSION NUMBER(S): [id number]   ORDERING CLINICIAN: GURDEEP ANTHONY   TECHNIQUE: AP erect and lateral views of the chest were acquired.   FINDINGS: Normal heart, mediastinum, and lungs. A small amount of calcified plaque within the aortic arch is seen. There are degenerative changes of the thoracic spine.       No acute cardiopulmonary disease.   Signed by: Marisela Dempsey 4/4/2025 9:01 AM Dictation workstation:   [id number]    XR foot left 3+ views    Result Date: 4/3/2025  Interpreted By:  Marisela Dempsey, STUDY: XR FOOT LEFT 3+ VIEWS 4/3/2025 4:58 pm   INDICATION: Discoloration of the great toe for 2 weeks. Swelling and erythema.   COMPARISON: None available.   ACCESSION NUMBER(S): [id number]   ORDERING CLINICIAN: ESPINOZA SIMPSON   TECHNIQUE: Three views of the left foot   FINDINGS: There is soft tissue swelling of the great toe with mild narrowing of the 1st  MTP joint and with mild osteophytosis about this joint. There is a focal area of osteomyelitis along the medial aspect of the tuft of the distal phalanx of the great toe suggested on the oblique view suspicious for small area of osteomyelitis at this site.   Plantar and posterior calcaneal spurs are present.       On oblique view, there is a small area of cortical destruction suggested along the medial aspect of the tuft of the distal phalanx of the great toe suggesting a small area of osteomyelitis at this site.   DJD of the 1st MTP joint.   Plantar and posterior calcaneal spurs.   Signed by: Marisela Dempsey 4/3/2025 5:07 PM Dictation workstation:   [id number]    CT angio aorta and bilateral iliofemoral runoff including without contrast if performed    Result Date: 4/1/2025  Interpreted By:  Mario Hall, STUDY: CT ANGIO AORTA AND BILATERAL ILIOFEMORAL RUN OFF INCLUDING WITHOUT CONTRAST IF PERFORMED;  3/31/2025 9:36 am   INDICATION: Signs/Symptoms:PVD   COMPARISON: 08/09/2023 CT abdomen and pelvis   ACCESSION NUMBER(S): [id number]   ORDERING CLINICIAN: DEEPA ROMAN   TECHNIQUE: Thin-section axial images of the abdomen and pelvis in the arterial phase after intravenous administration of  75 mL Omnipaque 350. Sagittal and coronal reconstructions. 3D reconstructions were obtained at a separate workstation.   FINDINGS: Vascular:  No aortic aneurysm or dissection.   The celiac trunk, superior mesenteric artery, renal arteries and inferior mesenteric artery are patent.   Right lower extremity: Right common iliac artery is widely patent. Internal iliac artery is patent. External iliac artery is occluded at its origin without reconstitution. There is reconstitution of the common femoral artery. Severe stenosis at the origin of the profundus with distal reconstitution. Superficial femoral artery is patent throughout. Popliteal artery is patent. Proximal tibial arteries are patent. The peroneal and posterior tibial arteries  are patent to the ankle. The anterior tibial artery is attenuated along the distal leg.   Left lower extremity: Left common iliac artery is patent. Internal iliac artery is occluded with some degree of distal reconstitution about the distal branches. External iliac artery is patent. Common femoral artery is patent. Profundus is patent. Superficial femoral artery is occluded proximally with distal reconstitution. Popliteal artery is patent. Three-vessel tibial runoff to the ankle.   LOWER CHEST: No acute abnormality of the lung bases. BONES: No acute osseous abnormality. ABDOMINAL WALL: Fat filled right inguinal hernia.   ABDOMEN:   LIVER: Within normal limits. BILE DUCTS: Normal caliber. GALLBLADDER: No calcified gallstones. No wall thickening. PANCREAS: Within normal limits. SPLEEN: Within normal limits. ADRENALS: Within normal limits. KIDNEYS and URETERS: Symmetric renal enhancement. No hydronephrosis.   RETROPERITONEUM: No pathologically enlarged retroperitoneal lymph nodes.   PELVIS:   REPRODUCTIVE ORGANS: No pelvic masses. BLADDER: Within normal limits.   BOWEL: No dilated bowel.  Normal appendix. PERITONEUM: No ascites or free air, no fluid collection.       1. Occlusion of the right external iliac artery with right common femoral artery reconstitution. Severe stenosis at the origin of the right profundus with distal reconstitution. Superficial femoral artery is patent. Two-vessel runoff to the ankle as above. 2. Left internal iliac artery occlusion with distal reconstitution. Left superficial femoral artery occluded proximally with distal reconstitution. Popliteal artery and distal tibial vessels patent with three-vessel runoff. 3. No acute process within the abdomen or pelvis.   MACRO: none   Signed by: Mario Hall 4/1/2025 10:31 AM Dictation workstation:   [id number]    Vascular US PVR with exercise    Result Date: 3/28/2025           [place] [address]             Phone [phone]  Vascular Lab Report  [facility] US PVR WITH EXERCISE Patient Name:      LEIGHANN FOREMAN       Reading Physician:  [id number] Rosario Draper MD Study Date:        3/28/2025           Ordering Provider:  [id number] RAFAL GALELE MRN/PID:           [id number]            Fellow: Accession#:        [id number]        Technologist:       Gabi Mcfadden RVT Date of Birth/Age: 1965 / 59     Technologist 2:                    years Gender:            M                   Encounter#:         [id number] Admission Status:  Outpatient          Location Performed: [facility]  Diagnosis/ICD: Atherosclerosis of native arteries of extremities with                intermittent claudication, bilateral legs-I70.213 CPT Codes:     93917 Peripheral artery PVR with exercise  Pertinent History: Purple discoloration of all left toes.  CONCLUSIONS: Right Lower PVR: There is evidence of mild multi vessel disease. Hemodynamics are further compromised in the right lower extremity with exercise. Decreased digital perfusion noted. Monophasic flow is noted in the right common femoral artery, right popliteal artery, right posterior tibial artery and right dorsalis pedis artery. Left Lower PVR: There is evidence of mild multi-vessel disease. Hemodynamics are further compromised in the left lower extremity with exercise. Decreased digital perfusion noted. Monophasic flow is noted in the left common femoral artery, left popliteal artery and left dorsalis pedis artery. Left posterior tibial artery is not audible. Exercise:Exercise completed at 10% grade and 2 miles per hour for 2 minutes. The patient experienced pain in the right calf and the left calf at 1.5 minutes. The exam was terminated due to extremity pain.  Imaging & Doppler Findings:  RIGHT Lower PVR                Pressures Ratios Right High Thigh               142 mmHg  1.10 Right Low Thigh                125 mmHg   0.97 Right Calf                     104 mmHg  0.81 Right Posterior Tibial (Ankle) 105 mmHg  0.81 Right Dorsalis Pedis (Ankle)   109 mmHg  0.84 Right Digit (Great Toe)        62 mmHg   0.48  Right Ankle Post Exercise      64 mmHg   0.40 5 Minute Pressure              90 mmHg   0.64  LEFT Lower PVR              Pressures Ratios Left High Thigh             135 mmHg  1.05 Left Low Thigh              111 mmHg  0.86 Left Calf                   96 mmHg   0.74 Left Dorsalis Pedis (Ankle) 91 mmHg   0.71 Left Digit (Great Toe)      41 mmHg   0.32  Left Ankle Post Exercise    53 mmHg   0.33 5 Minute Pressure           54 mmHg   0.38                     Right     Left Brachial Pressure 121 mmHg 129 mmHg   [id number] Rosario Draper MD Electronically signed by [id number] Rosario Draper MD on 3/28/2025 at 12:09:30 PM  ** Final **      Assessment/Plan   Left hallux gangrene  Left foot cellulitis  Left hallux osteomyelitis  Peripheral arterial disease with left limb subacute ischemia, s/p angiogram with stent  Acute uncomplicated sigmoid diverticulitis      Continue IV zosyn  Continue IV vancomycin  Monitor WBC and temperature  Monitor renal function  Follow-up blood cultures  Vascular follow-up  Podiatry follow-up    Brayan Clemens MD

## 2025-04-07 NOTE — CONSULTS
Inpatient consult to Podiatry  Consult performed by: Joselo Sapp DPM  Consult ordered by: Katiuska Esparza MD      Inpatient consult to Podiatry  Consult performed by: Joselo Sapp DPM  Consult ordered by: SHAYLA Hdz-CNP          Reason For Consult  Left big toe gangrene    History Of Present Illness  Connor Farr is a 59 y.o. male presenting with left big toe gangrene.  He says that he stubbed his big toe 2 Saturdays ago and lost the toenail.  Before that his toe was turning slightly purple and he felt he might have had an infection.  However he did not notice any wound prior to 2 Saturdays ago.  He has noticed since being in the hospital that the toes seems to have gotten worse, more black discoloration.  This is somewhat painful for him.  Denies constitutional symptoms today.  He did undergo angiogram of both the right and left with Dr. Oro today.     Past Medical History  He has a past medical history of Acute sinusitis, unspecified (08/29/2013), Body mass index (BMI) 27.0-27.9, adult, Fatty liver, High cholesterol, Hypertension, Personal history of other diseases of the respiratory system (11/26/2013), and Personal history of other specified conditions (08/01/2013).    Surgical History  He has no past surgical history on file.     Social History  He reports that he has been smoking cigarettes. He has never used smokeless tobacco. He reports that he does not drink alcohol and does not use drugs.    Family History  Family History   Problem Relation Name Age of Onset    Other (MTHFR mutation) Sister      No Known Problems Daughter      No Known Problems Daughter      No Known Problems Son      Hypertension Other FHx         essential    Heart disease Other FHx     Diabetes type II Other FHx         Allergies  Cephalexin    Review of Systems    REVIEW OF SYSTEMS  GENERAL:  Negative for malaise, significant weight loss, fever  HEENT:  No changes in hearing or vision, no nose bleeds or other  nasal problems and Negative for frequent or significant headaches  NECK:  Negative for lumps, goiter, pain and significant neck swelling  RESPIRATORY:  Negative for cough, wheezing and shortness of breath  CARDIOVASCULAR:  Negative for chest pain, leg swelling and palpitations  GI:  Negative for abdominal discomfort, blood in stools or black stools and change in bowel habits  :  Negative for dysuria, frequency and incontinence  MUSCULOSKELETAL:  Negative for joint pain or swelling, back pain, and muscle pain.  SKIN: Positive for gangrene to the left big toe   PSYCH:  Negative for sleep disturbance, mood disorder and recent psychosocial stressors  HEMATOLOGY/LYMPHOLOGY:  Negative for prolonged bleeding, bruising easily, and swollen nodes.  ENDOCRINE:  Negative for cold or heat intolerance, polyuria, polydipsia and goiter  NEURO: Negative, denies any burning, tingling or numbness     Objective:   Vasc: DP pulse to the left is palpable.  There is evidence of ischemic gangrene to the distal dorsal aspect of the left big toe.  See images below.  There is no complete dry gangrene to the entirety of the toe.  CFT is delayed bilaterally.    Neuro:  Light touch is intact to the foot bilateral.  Protective sensation intact.    Derm: Skin is atrophic, thin, at baseline discolored, mild edema, lack of pedal hair.  The left big toe does have partial black gangrene to the dorsal distal aspect.  There is no bone exposed on my evaluation.  The wound seems to probe deep to subcutaneous tissue with some fibrotic tissue surrounding.  No purulence.  No bleeding.  No active drainage.  No ascending cellulitis.  No significant surrounding erythema.  Overall does not appear aggressively infected.    Ortho: Patient does have all 10 toes.               Physical Exam     Last Recorded Vitals  Blood pressure 133/82, pulse 71, temperature 36.5 °C (97.7 °F), temperature source Temporal, resp. rate 17, height 1.829 m (6'), weight 108 kg (238 lb  12.1 oz), SpO2 97%.    Relevant Results  XR left foot 4/3: Radiologist did mention on the medial oblique view there may be a small suggestion of cortical destruction at the medial distal tuft to suggest osteomyelitis.  On my personal read I do not feel it directly correlates with osteomyelitis.    No leukocytosis  CRP mildly elevated on admission 2.5  ESR with mild elevation on admission 28    Blood cultures negative  Wound cultures none    Assessment/Plan     1.  Left foot gangrene  2.  Left foot ulcer to subcutaneous/fat layer  3.  Peripheral vascular disease    -Wound painted with Betadine.  Continue applying light bandage.  -Reviewed vascular surgery notes.  Appreciate input.  -Infectious disease following.  Empiric antibiotics for now.  -Patient unable to obtain MRI secondary to implants.    Had an extensive discussion with this patient today.  Questionable osteomyelitis on radiographs.  He does have gangrene dorsally and distally to the hallux.  Appears secondary to vascular disease.  From my standpoint I do feel amputation is the most reliable treatment option, but there may be a chance this can survive.  I discussed amputation.  Patient adamantly wishes to try to save his toe-but will discuss options with his wife and friends.  I discussed PICC IV antibiotics and wound care, expected outcomes, and possibility for needed amputation in the future still.  I also discussed expected outcomes with amputation.  For now he would like to proceed with PICC IV antibiotics and wound care, pending his discussion with family.    Will continue to follow closely while in house.      I spent 70 minutes in the professional and overall care of this patient.    Joselo Sapp D.P.M.

## 2025-04-07 NOTE — INTERVAL H&P NOTE
H&P reviewed. The patient was examined and there are no changes to the H&P. Has femoropopliteal disease on left with tissue loss which is relatively unusual without smoking which the patient does.     Will proceed with arteriography and intervention.

## 2025-04-07 NOTE — PROGRESS NOTES
Patient status post angiogram by Dr. Oro: right iliofemoral stent, left sfa pop stent, left tibioperoneal trunk PTA and suction thrombectomy. Now has palpable left DP pulse.   -Awaiting podiatry input regarding plan for hallux gangrene and concern of distal tuft osteomyelitis  - Start aspirin 81 mg and heparin drip to be started at 3 PM today  - Plan to change over to Plavix and Eliquis after podiatry procedure.  -Patient to keep leg straight for 4 hours  -Pulse exam every 2 hours -palpable DP

## 2025-04-08 DIAGNOSIS — I73.9 PERIPHERAL VASCULAR DISEASE (CMS-HCC): Primary | ICD-10-CM

## 2025-04-08 LAB
ANION GAP SERPL CALCULATED.3IONS-SCNC: 13 MMOL/L (ref 10–20)
APTT PPP: 25.4 SECONDS (ref 22–32.5)
APTT PPP: 27.1 SECONDS (ref 22–32.5)
APTT PPP: 31.5 SECONDS (ref 22–32.5)
BACTERIA BLD CULT: NORMAL
BACTERIA BLD CULT: NORMAL
BUN SERPL-MCNC: 7 MG/DL (ref 6–23)
CALCIUM SERPL-MCNC: 8.5 MG/DL (ref 8.6–10.3)
CHLORIDE SERPL-SCNC: 102 MMOL/L (ref 98–107)
CO2 SERPL-SCNC: 23 MMOL/L (ref 21–32)
CREAT SERPL-MCNC: 0.63 MG/DL (ref 0.5–1.3)
EGFRCR SERPLBLD CKD-EPI 2021: >90 ML/MIN/1.73M*2
ERYTHROCYTE [DISTWIDTH] IN BLOOD BY AUTOMATED COUNT: 13.1 % (ref 11.5–14.5)
GLUCOSE BLD MANUAL STRIP-MCNC: 119 MG/DL (ref 74–99)
GLUCOSE SERPL-MCNC: 109 MG/DL (ref 74–99)
HCT VFR BLD AUTO: 34.9 % (ref 41–52)
HGB BLD-MCNC: 12.1 G/DL (ref 13.5–17.5)
MCH RBC QN AUTO: 31.7 PG (ref 26–34)
MCHC RBC AUTO-ENTMCNC: 34.7 G/DL (ref 32–36)
MCV RBC AUTO: 91 FL (ref 80–100)
NRBC BLD-RTO: 0 /100 WBCS (ref 0–0)
PLATELET # BLD AUTO: 235 X10*3/UL (ref 150–450)
POTASSIUM SERPL-SCNC: 3.6 MMOL/L (ref 3.5–5.3)
RBC # BLD AUTO: 3.82 X10*6/UL (ref 4.5–5.9)
SODIUM SERPL-SCNC: 134 MMOL/L (ref 136–145)
WBC # BLD AUTO: 9.7 X10*3/UL (ref 4.4–11.3)

## 2025-04-08 PROCEDURE — 85730 THROMBOPLASTIN TIME PARTIAL: CPT | Performed by: NURSE PRACTITIONER

## 2025-04-08 PROCEDURE — 2500000002 HC RX 250 W HCPCS SELF ADMINISTERED DRUGS (ALT 637 FOR MEDICARE OP, ALT 636 FOR OP/ED): Performed by: NURSE PRACTITIONER

## 2025-04-08 PROCEDURE — 2500000004 HC RX 250 GENERAL PHARMACY W/ HCPCS (ALT 636 FOR OP/ED): Performed by: INTERNAL MEDICINE

## 2025-04-08 PROCEDURE — 2500000005 HC RX 250 GENERAL PHARMACY W/O HCPCS: Performed by: NURSE PRACTITIONER

## 2025-04-08 PROCEDURE — 85027 COMPLETE CBC AUTOMATED: CPT | Performed by: INTERNAL MEDICINE

## 2025-04-08 PROCEDURE — 2500000002 HC RX 250 W HCPCS SELF ADMINISTERED DRUGS (ALT 637 FOR MEDICARE OP, ALT 636 FOR OP/ED): Performed by: INTERNAL MEDICINE

## 2025-04-08 PROCEDURE — 2500000001 HC RX 250 WO HCPCS SELF ADMINISTERED DRUGS (ALT 637 FOR MEDICARE OP): Performed by: NURSE PRACTITIONER

## 2025-04-08 PROCEDURE — 82947 ASSAY GLUCOSE BLOOD QUANT: CPT

## 2025-04-08 PROCEDURE — 36415 COLL VENOUS BLD VENIPUNCTURE: CPT | Performed by: INTERNAL MEDICINE

## 2025-04-08 PROCEDURE — 99232 SBSQ HOSP IP/OBS MODERATE 35: CPT | Performed by: NURSE PRACTITIONER

## 2025-04-08 PROCEDURE — 2500000004 HC RX 250 GENERAL PHARMACY W/ HCPCS (ALT 636 FOR OP/ED): Performed by: NURSE PRACTITIONER

## 2025-04-08 PROCEDURE — 2060000001 HC INTERMEDIATE ICU ROOM DAILY

## 2025-04-08 PROCEDURE — 99232 SBSQ HOSP IP/OBS MODERATE 35: CPT | Performed by: INTERNAL MEDICINE

## 2025-04-08 PROCEDURE — 36415 COLL VENOUS BLD VENIPUNCTURE: CPT | Performed by: NURSE PRACTITIONER

## 2025-04-08 PROCEDURE — 80048 BASIC METABOLIC PNL TOTAL CA: CPT | Performed by: INTERNAL MEDICINE

## 2025-04-08 PROCEDURE — RXMED WILLOW AMBULATORY MEDICATION CHARGE

## 2025-04-08 PROCEDURE — 2500000001 HC RX 250 WO HCPCS SELF ADMINISTERED DRUGS (ALT 637 FOR MEDICARE OP): Performed by: INTERNAL MEDICINE

## 2025-04-08 PROCEDURE — S4991 NICOTINE PATCH NONLEGEND: HCPCS | Performed by: INTERNAL MEDICINE

## 2025-04-08 RX ORDER — CLOPIDOGREL BISULFATE 75 MG/1
75 TABLET ORAL DAILY
Qty: 30 TABLET | Refills: 11 | Status: SHIPPED | OUTPATIENT
Start: 2025-04-08 | End: 2026-04-08

## 2025-04-08 RX ADMIN — PIPERACILLIN SODIUM AND TAZOBACTAM SODIUM 4.5 G: 4; .5 INJECTION, SOLUTION INTRAVENOUS at 19:30

## 2025-04-08 RX ADMIN — VANCOMYCIN 1.5 G: 1.5 INJECTION, SOLUTION INTRAVENOUS at 11:31

## 2025-04-08 RX ADMIN — PIPERACILLIN SODIUM AND TAZOBACTAM SODIUM 4.5 G: 4; .5 INJECTION, SOLUTION INTRAVENOUS at 06:04

## 2025-04-08 RX ADMIN — NICOTINE 1 PATCH: 14 PATCH, EXTENDED RELEASE TRANSDERMAL at 09:01

## 2025-04-08 RX ADMIN — OXYCODONE HYDROCHLORIDE AND ACETAMINOPHEN 1 TABLET: 5; 325 TABLET ORAL at 11:31

## 2025-04-08 RX ADMIN — LEVOTHYROXINE SODIUM 75 MCG: 0.07 TABLET ORAL at 21:52

## 2025-04-08 RX ADMIN — PANTOPRAZOLE SODIUM 20 MG: 20 TABLET, DELAYED RELEASE ORAL at 06:04

## 2025-04-08 RX ADMIN — Medication 2 L/MIN: at 07:30

## 2025-04-08 RX ADMIN — HEPARIN SODIUM 1900 UNITS/HR: 10000 INJECTION, SOLUTION INTRAVENOUS at 17:46

## 2025-04-08 RX ADMIN — PIPERACILLIN SODIUM AND TAZOBACTAM SODIUM 4.5 G: 4; .5 INJECTION, SOLUTION INTRAVENOUS at 13:41

## 2025-04-08 RX ADMIN — VANCOMYCIN 1.5 G: 1.5 INJECTION, SOLUTION INTRAVENOUS at 21:52

## 2025-04-08 RX ADMIN — HEPARIN SODIUM 1600 UNITS/HR: 10000 INJECTION, SOLUTION INTRAVENOUS at 16:29

## 2025-04-08 RX ADMIN — METOPROLOL SUCCINATE 100 MG: 100 TABLET, EXTENDED RELEASE ORAL at 21:52

## 2025-04-08 RX ADMIN — ROSUVASTATIN CALCIUM 20 MG: 20 TABLET, FILM COATED ORAL at 21:52

## 2025-04-08 RX ADMIN — LOSARTAN POTASSIUM 100 MG: 100 TABLET, FILM COATED ORAL at 21:52

## 2025-04-08 RX ADMIN — ASPIRIN 81 MG: 81 TABLET, COATED ORAL at 09:01

## 2025-04-08 RX ADMIN — PIPERACILLIN SODIUM AND TAZOBACTAM SODIUM 4.5 G: 4; .5 INJECTION, SOLUTION INTRAVENOUS at 00:21

## 2025-04-08 RX ADMIN — OXYCODONE HYDROCHLORIDE AND ACETAMINOPHEN 1 TABLET: 5; 325 TABLET ORAL at 19:29

## 2025-04-08 RX ADMIN — OXYCODONE HYDROCHLORIDE AND ACETAMINOPHEN 1 TABLET: 5; 325 TABLET ORAL at 06:04

## 2025-04-08 RX ADMIN — CYCLOBENZAPRINE 10 MG: 10 TABLET, FILM COATED ORAL at 21:52

## 2025-04-08 ASSESSMENT — COGNITIVE AND FUNCTIONAL STATUS - GENERAL
MOBILITY SCORE: 24
MOBILITY SCORE: 24
DAILY ACTIVITIY SCORE: 24
DAILY ACTIVITIY SCORE: 24

## 2025-04-08 ASSESSMENT — PAIN SCALES - GENERAL
PAINLEVEL_OUTOF10: 4
PAINLEVEL_OUTOF10: 7
PAINLEVEL_OUTOF10: 7
PAINLEVEL_OUTOF10: 8

## 2025-04-08 ASSESSMENT — ENCOUNTER SYMPTOMS: WOUND: 1

## 2025-04-08 ASSESSMENT — PAIN DESCRIPTION - LOCATION: LOCATION: GROIN

## 2025-04-08 ASSESSMENT — PAIN DESCRIPTION - DESCRIPTORS: DESCRIPTORS: ACHING

## 2025-04-08 ASSESSMENT — PAIN DESCRIPTION - ORIENTATION: ORIENTATION: LEFT

## 2025-04-08 NOTE — PROGRESS NOTES
Connor Farr is a 59 y.o. male on day 5 of admission presenting with Osteomyelitis of left foot (Multi).    Subjective   Patient refusing amputation.  Wants to do conservative treatment with antibiotic  Patient s/p angiogram   Also with acute diverticulitis symptoms are doing better        Objective     Physical Exam  Vitals reviewed.   Constitutional:       Appearance: Normal appearance.   HENT:      Head: Normocephalic and atraumatic.      Right Ear: Tympanic membrane, ear canal and external ear normal.      Left Ear: Tympanic membrane, ear canal and external ear normal.      Nose: Nose normal.      Mouth/Throat:      Pharynx: Oropharynx is clear.   Eyes:      Extraocular Movements: Extraocular movements intact.      Conjunctiva/sclera: Conjunctivae normal.      Pupils: Pupils are equal, round, and reactive to light.   Cardiovascular:      Rate and Rhythm: Normal rate and regular rhythm.      Pulses: Normal pulses.      Heart sounds: Normal heart sounds.   Pulmonary:      Effort: Pulmonary effort is normal.      Breath sounds: Normal breath sounds.   Abdominal:      General: Abdomen is flat. Bowel sounds are normal.      Palpations: Abdomen is soft.   Musculoskeletal:      Cervical back: Normal range of motion and neck supple.      Comments: Gangrene left big toe   Skin:     General: Skin is warm and dry.   Neurological:      General: No focal deficit present.      Mental Status: He is alert and oriented to person, place, and time.   Psychiatric:         Mood and Affect: Mood normal.         Last Recorded Vitals  Blood pressure 115/72, pulse 85, temperature 36.9 °C (98.4 °F), temperature source Temporal, resp. rate 16, height 1.829 m (6'), weight 108 kg (238 lb 1.6 oz), SpO2 96%.  Intake/Output last 3 Shifts:  I/O last 3 completed shifts:  In: 299.2 (2.8 mL/kg) [P.O.:240; I.V.:59.2 (0.5 mL/kg)]  Out: 500 (4.6 mL/kg) [Urine:400 (0.1 mL/kg/hr); Blood:100]  Weight: 108 kg     Relevant Results               Scheduled  medications  aspirin, 81 mg, oral, Daily  ipratropium-albuteroL, 3 mL, nebulization, Once  ipratropium-albuteroL, 3 mL, nebulization, Once  levothyroxine, 75 mcg, oral, Nightly  losartan, 100 mg, oral, Nightly  metoprolol succinate XL, 100 mg, oral, Nightly  nicotine, 1 patch, transdermal, Daily  pantoprazole, 20 mg, oral, Daily before breakfast  piperacillin-tazobactam, 4.5 g, intravenous, q6h  rosuvastatin, 20 mg, oral, Nightly  vancomycin, 1,500 mg, intravenous, q12h      Continuous medications  heparin, 0-4,000 Units/hr, Last Rate: 1,900 Units/hr (04/08/25 1746)      PRN medications  PRN medications: albuterol, cyclobenzaprine, heparin (porcine), HYDROmorphone, oxyCODONE-acetaminophen, vancomycin  Results for orders placed or performed during the hospital encounter of 04/03/25 (from the past 24 hours)   aPTT   Result Value Ref Range    aPTT 27.1 22.0 - 32.5 seconds   POCT GLUCOSE   Result Value Ref Range    POCT Glucose 119 (H) 74 - 99 mg/dL   CBC   Result Value Ref Range    WBC 9.7 4.4 - 11.3 x10*3/uL    nRBC 0.0 0.0 - 0.0 /100 WBCs    RBC 3.82 (L) 4.50 - 5.90 x10*6/uL    Hemoglobin 12.1 (L) 13.5 - 17.5 g/dL    Hematocrit 34.9 (L) 41.0 - 52.0 %    MCV 91 80 - 100 fL    MCH 31.7 26.0 - 34.0 pg    MCHC 34.7 32.0 - 36.0 g/dL    RDW 13.1 11.5 - 14.5 %    Platelets 235 150 - 450 x10*3/uL   Basic Metabolic Panel   Result Value Ref Range    Glucose 109 (H) 74 - 99 mg/dL    Sodium 134 (L) 136 - 145 mmol/L    Potassium 3.6 3.5 - 5.3 mmol/L    Chloride 102 98 - 107 mmol/L    Bicarbonate 23 21 - 32 mmol/L    Anion Gap 13 10 - 20 mmol/L    Urea Nitrogen 7 6 - 23 mg/dL    Creatinine 0.63 0.50 - 1.30 mg/dL    eGFR >90 >60 mL/min/1.73m*2    Calcium 8.5 (L) 8.6 - 10.3 mg/dL   aPTT   Result Value Ref Range    aPTT 31.5 22.0 - 32.5 seconds   aPTT   Result Value Ref Range    aPTT 25.4 22.0 - 32.5 seconds     *Note: Due to a large number of results and/or encounters for the requested time period, some results have not been  displayed. A complete set of results can be found in Results Review.     Invasive vascular procedure    Result Date: 4/7/2025  This study is a surgery completed in an invasive cardiovascular space. Please see OpNote on Notes tab for findings.                  Assessment/Plan   Assessment & Plan  Osteomyelitis of left foot (Multi)    Cough    PAD (peripheral artery disease) (CMS-[facility])    Acute diverticulitis    Hypertension    Patient does not want to do toe amputation  Podiatry input noted  Plan for PICC line and IV antibiotics and wound care  Waiting for ID input  S/p angio left leg  Acute diverticulitis doing better  Continue antibiotics per ID continue IV Zosyn and vancomycin  Need for discharge antibiotics per ID         I spent  minutes in the professional and overall care of this patient.      Love Esqueda MD

## 2025-04-08 NOTE — PROGRESS NOTES
Connor Farr is a 59 y.o. male on day 4 of admission presenting with Osteomyelitis of left foot (Multi).    Subjective   H&P reviewed  Patient s/p angiogram by Dr. Oro today  Patient presented with big toe gangrene and osteomyelitis  Also with acute diverticulitis symptoms are doing better        Objective     Physical Exam  Vitals reviewed.   Constitutional:       Appearance: Normal appearance.   HENT:      Head: Normocephalic and atraumatic.      Right Ear: Tympanic membrane, ear canal and external ear normal.      Left Ear: Tympanic membrane, ear canal and external ear normal.      Nose: Nose normal.      Mouth/Throat:      Pharynx: Oropharynx is clear.   Eyes:      Extraocular Movements: Extraocular movements intact.      Conjunctiva/sclera: Conjunctivae normal.      Pupils: Pupils are equal, round, and reactive to light.   Cardiovascular:      Rate and Rhythm: Normal rate and regular rhythm.      Pulses: Normal pulses.      Heart sounds: Normal heart sounds.   Pulmonary:      Effort: Pulmonary effort is normal.      Breath sounds: Normal breath sounds.   Abdominal:      General: Abdomen is flat. Bowel sounds are normal.      Palpations: Abdomen is soft.   Musculoskeletal:      Cervical back: Normal range of motion and neck supple.      Comments: Gangrene left big toe   Skin:     General: Skin is warm and dry.   Neurological:      General: No focal deficit present.      Mental Status: He is alert and oriented to person, place, and time.   Psychiatric:         Mood and Affect: Mood normal.         Last Recorded Vitals  Blood pressure 116/65, pulse 75, temperature 37.1 °C (98.8 °F), temperature source Oral, resp. rate 16, height 1.829 m (6'), weight 108 kg (238 lb 1.6 oz), SpO2 97%.  Intake/Output last 3 Shifts:  I/O last 3 completed shifts:  In: 1700 (15.7 mL/kg) [P.O.:200; IV Piggyback:1500]  Out: 500 (4.6 mL/kg) [Urine:400 (0.1 mL/kg/hr); Blood:100]  Weight: 108 kg     Relevant Results               Scheduled  medications  aspirin, 81 mg, oral, Daily  ipratropium-albuteroL, 3 mL, nebulization, Once  ipratropium-albuteroL, 3 mL, nebulization, Once  levothyroxine, 75 mcg, oral, Nightly  losartan, 100 mg, oral, Nightly  metoprolol succinate XL, 100 mg, oral, Nightly  nicotine, 1 patch, transdermal, Daily  oxygen, , inhalation, Continuous - 02/gases  pantoprazole, 20 mg, oral, Daily before breakfast  piperacillin-tazobactam, 4.5 g, intravenous, q6h  rosuvastatin, 20 mg, oral, Nightly  vancomycin, 1,500 mg, intravenous, q12h      Continuous medications  heparin, 0-4,000 Units/hr, Last Rate: 1,000 Units/hr (04/07/25 2238)      PRN medications  PRN medications: albuterol, cyclobenzaprine, heparin (porcine), HYDROmorphone, oxyCODONE-acetaminophen, vancomycin  Results for orders placed or performed during the hospital encounter of 04/03/25 (from the past 24 hours)   CBC   Result Value Ref Range    WBC 8.8 4.4 - 11.3 x10*3/uL    nRBC 0.0 0.0 - 0.0 /100 WBCs    RBC 4.05 (L) 4.50 - 5.90 x10*6/uL    Hemoglobin 12.7 (L) 13.5 - 17.5 g/dL    Hematocrit 37.5 (L) 41.0 - 52.0 %    MCV 93 80 - 100 fL    MCH 31.4 26.0 - 34.0 pg    MCHC 33.9 32.0 - 36.0 g/dL    RDW 13.1 11.5 - 14.5 %    Platelets 242 150 - 450 x10*3/uL   Basic Metabolic Panel   Result Value Ref Range    Glucose 114 (H) 74 - 99 mg/dL    Sodium 136 136 - 145 mmol/L    Potassium 3.9 3.5 - 5.3 mmol/L    Chloride 105 98 - 107 mmol/L    Bicarbonate 23 21 - 32 mmol/L    Anion Gap 12 10 - 20 mmol/L    Urea Nitrogen 5 (L) 6 - 23 mg/dL    Creatinine 0.60 0.50 - 1.30 mg/dL    eGFR >90 >60 mL/min/1.73m*2    Calcium 8.6 8.6 - 10.3 mg/dL   ACTIVATED CLOTTING TIME LOW   Result Value Ref Range    POCT Activated Clotting Time Low Range 326 (H) 89 - 169 sec   ACTIVATED CLOTTING TIME LOW   Result Value Ref Range    POCT Activated Clotting Time Low Range 261 (H) 89 - 169 sec   ACTIVATED CLOTTING TIME LOW   Result Value Ref Range    POCT Activated Clotting Time Low Range 82 (L) 89 - 169 sec    ACTIVATED CLOTTING TIME LOW   Result Value Ref Range    POCT Activated Clotting Time Low Range >400 (H) 89 - 169 sec   aPTT   Result Value Ref Range    aPTT >139.0 (HH) 22.0 - 32.5 seconds   Platelet count - baseline   Result Value Ref Range    Platelets 240 150 - 450 x10*3/uL   aPTT   Result Value Ref Range    aPTT 25.8 22.0 - 32.5 seconds     Invasive vascular procedure    Result Date: 4/7/2025  This study is a surgery completed in an invasive cardiovascular space. Please see OpNote on Notes tab for findings.                  Assessment/Plan   Assessment & Plan  Osteomyelitis of left foot (Multi)    Cough    PAD (peripheral artery disease) (CMS-[facility])    Acute diverticulitis    Hypertension    Patient does not want to do toe amputation  Podiatry input noted  Plan for PICC line and IV antibiotics and wound care  S/p angio left leg  Acute diverticulitis doing better  Continue antibiotics per ID continue IV Zosyn and vancomycin  Need for discharge antibiotics per ID         I spent  minutes in the professional and overall care of this patient.      Love Esqueda MD

## 2025-04-08 NOTE — PROGRESS NOTES
04/08/25 1433   Discharge Planning   Who is requesting discharge planning? Provider   Home or Post Acute Services In home services   Type of Home Care Services Home nursing visits   Expected Discharge Disposition Home H  ( Home infusion)   Does the patient need discharge transport arranged? No     TCC spoke to patient at bedside. Pt is agreeable to home IV atb's. Stated the referral will be sent to [facility] home infusion for insurance approval after order is placed.     Requested Dr. Esqueda to place an INTERNAL referral for home infusion.     DISCHARGE PLAN TO RETURN HOME WITH [facility] HOME INFUSION.     DISCHARGE NOT SECURE.   DO NOT DISCHARGE WITHOUT SPEAKING TO CARE COORDINATION.

## 2025-04-08 NOTE — PROGRESS NOTES
Spiritual Care Visit  Spiritual Care Request    Reason for Visit:  Routine Visit: Introduction     Request Received From:       Focus of Care:  Visited With: Patient         Refer to :          Spiritual Care Assessment    Spiritual Assessment:                      Care Provided:  Intended Effects: Establish rapport and connectedness, Build relationship of care and support, Convey a calming presence  Methods: Offer emotional support  Interventions: Explain  role    Sense of Community and or Restorationist Affiliation:  None         Addressed Needs/Concerns and/or Sam[place] Through:          Outcome:  Outcome of Spiritual Care Visit: Identifying spiritual/emotional issues, Comfort/healing presence, Crisis subsided/resolved     Advance Directives:         Spiritual Care Annotation      Annotation:  provided patient support while rounding the Unit.  introduced  services of [facility].  explained the role of the  in providing emotional and spiritual support for patient's and family while in admitted to the hospital.     Patient was resting in the hospital bed and appears comfortable and pain free. Patient shared his medical concerns.  Patient stated that he had surgery to regain blood flow to his lower extremity.  Patient was very upbeat and talkative.  Patient is looking forward to getting back to his routine.      No spiritual or Faith needs were expressed. Spiritual care will remain available for support as requested.

## 2025-04-08 NOTE — PROGRESS NOTES
Connor Farr is a 59 y.o. male on day 5 of admission presenting with Osteomyelitis of left foot (Multi).      Subjective   Patient resting in bed, in no acute distress.    He is being seen by Podiatry and ID, secondary to gangrene on his left great toe..  It was discussed with the patient yesterday by podiatry, that he could either choose to amputate his left hallux or have a PICC line placed for IV antibiotics and do wound care.  The patient is not interested in having his toe amputated at this time.  He understands that he may potentially still need an amputation in the future, should his toe not heal with antibiotics and wound care.    Arrangements are currently being made for the patient to have a PICC line placed in one of his arms, for the IV antibiotics at home.    Patient had left leg angiogram with Dr. Oro yesterday, secondary to gangrene of his left hallux.    Angiogram Findings: Occluded right EIA, left SFA with AK POP reconstitution, single vessel runoff via peroneal that delivers flow to foot via collateral, PTAS of right EIA, left SFA PTAS with Viabahn, and terminal stent with JORGE at [facility]. Thromboembolism noted to tibioperoneal trunk bifurcation. Suction thrombectomy with resolution. Dissection right CFA/EIA treated with stent to proximal CFA right. Occluded right PFA.     Patient currently on a heparin drip and taking aspirin.  When the patient is sent home, he will be switched to Xarelto and Plavix.  Prescriptions for these medications were E scripted to [facility] outpatient pharmacy.    Objective     Last Recorded Vitals  /83 (BP Location: Right arm, Patient Position: Lying)   Pulse 74   Temp 36.3 °C (97.3 °F) (Temporal)   Resp 16   Wt 108 kg (238 lb 1.6 oz)   SpO2 96%     Admission Weight  Weight: 109 kg (241 lb) (04/03/25 1630)    Daily Weight  04/07/25 : 108 kg (238 lb 1.6 oz)        Physical Exam:    Constitutional:  Alert and oriented to person, place, date/time in no  acute distress.  HEENT:  Atraumatic, normocephalic. PERRL. EOMI.  Nares patent.  Mucous membranes moist.  .   Neck:  Trachea midline.  Respiratory:  Clear to auscultation.  Cardiac:  Regular rate and rhythm.  No murmurs.  Cardiovascular: +1 edema of the left leg.  Pulse exam: Left dorsalis pedis pulse palpable.  Abdominal:  Soft, nontender, nondistended, bowel sounds present.  Musculoskeletal:  Moves extremities freely.  Left foot tenderness with palpation.  Dermatological: Left foot warm to touch.  Left hallux with gangrene, dry, no malodor, resolving erythema present of the left foot.   Extremely tender upon palpation.    Neurological: Alert and oriented to person, place, date/time  Psych:  Calm, cooperative      Scheduled medications  aspirin, 81 mg, oral, Daily  ipratropium-albuteroL, 3 mL, nebulization, Once  ipratropium-albuteroL, 3 mL, nebulization, Once  levothyroxine, 75 mcg, oral, Nightly  losartan, 100 mg, oral, Nightly  metoprolol succinate XL, 100 mg, oral, Nightly  nicotine, 1 patch, transdermal, Daily  pantoprazole, 20 mg, oral, Daily before breakfast  piperacillin-tazobactam, 4.5 g, intravenous, q6h  rosuvastatin, 20 mg, oral, Nightly  vancomycin, 1,500 mg, intravenous, q12h      Continuous medications  heparin, 0-4,000 Units/hr, Last Rate: 1,600 Units/hr (04/08/25 0902)      PRN medications  PRN medications: albuterol, cyclobenzaprine, heparin (porcine), HYDROmorphone, oxyCODONE-acetaminophen, vancomycin       Relevant Results  Results for orders placed or performed during the hospital encounter of 04/03/25 (from the past 24 hours)   aPTT   Result Value Ref Range    aPTT 25.8 22.0 - 32.5 seconds   aPTT   Result Value Ref Range    aPTT 27.1 22.0 - 32.5 seconds   POCT GLUCOSE   Result Value Ref Range    POCT Glucose 119 (H) 74 - 99 mg/dL   CBC   Result Value Ref Range    WBC 9.7 4.4 - 11.3 x10*3/uL    nRBC 0.0 0.0 - 0.0 /100 WBCs    RBC 3.82 (L) 4.50 - 5.90 x10*6/uL    Hemoglobin 12.1 (L) 13.5 - 17.5  g/dL    Hematocrit 34.9 (L) 41.0 - 52.0 %    MCV 91 80 - 100 fL    MCH 31.7 26.0 - 34.0 pg    MCHC 34.7 32.0 - 36.0 g/dL    RDW 13.1 11.5 - 14.5 %    Platelets 235 150 - 450 x10*3/uL   Basic Metabolic Panel   Result Value Ref Range    Glucose 109 (H) 74 - 99 mg/dL    Sodium 134 (L) 136 - 145 mmol/L    Potassium 3.6 3.5 - 5.3 mmol/L    Chloride 102 98 - 107 mmol/L    Bicarbonate 23 21 - 32 mmol/L    Anion Gap 13 10 - 20 mmol/L    Urea Nitrogen 7 6 - 23 mg/dL    Creatinine 0.63 0.50 - 1.30 mg/dL    eGFR >90 >60 mL/min/1.73m*2    Calcium 8.5 (L) 8.6 - 10.3 mg/dL   aPTT   Result Value Ref Range    aPTT 31.5 22.0 - 32.5 seconds     *Note: Due to a large number of results and/or encounters for the requested time period, some results have not been displayed. A complete set of results can be found in Results Review.      Imaging  CT abdomen and pelvis w oral contrast only    Result Date: 4/5/2025  Acute uncomplicated sigmoid diverticulitis.   MACRO: None   Signed by: Asiya Osborne 4/5/2025 8:23 AM Dictation workstation:   [id number]    XR chest 2 views    Result Date: 4/4/2025  No acute cardiopulmonary disease.   Signed by: Marisela Dempsey 4/4/2025 9:01 AM Dictation workstation:   [id number]    XR foot left 3+ views    Result Date: 4/3/2025  On oblique view, there is a small area of cortical destruction suggested along the medial aspect of the tuft of the distal phalanx of the great toe suggesting a small area of osteomyelitis at this site.   DJD of the 1st MTP joint.   Plantar and posterior calcaneal spurs.   Signed by: Marisela Dempsey 4/3/2025 5:07 PM Dictation workstation:   [id number]       Assessment/Plan:  PAD -left limb subacute limb ischemia  Left foot cellulitis  Left foot hallux gangrene    Patient status post left leg angiogram with intervention from yesterday.  ID on consult, on IV antibiotics   Podiatry discussed options with patient, concerning gangrene to left great toe.  The patient has elected to proceed with  IV antibiotics and local wound care.  Awaiting PICC line placement for at home use.  Prescriptions for Xarelto and Plavix called into outpatient [facility] pharmacy.  Follow-up orders placed.  Patient to follow-up with our practice in 2 weeks.  Patient may be discharged home from a vascular standpoint.           MARCIA Owen    Total time spent caring for the patient today was 45  minutes.  This includes time spent before the visit reviewing the chart, time spent during the visit, and time spent after the visit on documentation.

## 2025-04-08 NOTE — PROGRESS NOTES
Connor Farr is a 59 y.o. male on day 5 of admission presenting with Osteomyelitis of left foot (Multi).    Subjective   Resting comfortably in bed.  After discussion with family he would like to proceed with conservative measures, antibiotics and local wound care.  Denies constitutional symptoms.       Physical Exam    Objective     REVIEW OF SYSTEMS  GENERAL:  Negative for malaise, significant weight loss, fever  HEENT:  No changes in hearing or vision, no nose bleeds or other nasal problems and Negative for frequent or significant headaches  NECK:  Negative for lumps, goiter, pain and significant neck swelling  RESPIRATORY:  Negative for cough, wheezing and shortness of breath  CARDIOVASCULAR:  Negative for chest pain, leg swelling and palpitations  GI:  Negative for abdominal discomfort, blood in stools or black stools and change in bowel habits  :  Negative for dysuria, frequency and incontinence  MUSCULOSKELETAL:  Negative for joint pain or swelling, back pain, and muscle pain.  SKIN: Positive for gangrene to the left big toe   PSYCH:  Negative for sleep disturbance, mood disorder and recent psychosocial stressors  HEMATOLOGY/LYMPHOLOGY:  Negative for prolonged bleeding, bruising easily, and swollen nodes.  ENDOCRINE:  Negative for cold or heat intolerance, polyuria, polydipsia and goiter  NEURO: Negative, denies any burning, tingling or numbness      Objective:   Vasc: DP pulse to the left is palpable.  There is evidence of ischemic gangrene to the distal dorsal aspect of the left big toe.  See images below.  There is no complete dry gangrene to the entirety of the toe.  CFT is delayed bilaterally.     Neuro:  Light touch is intact to the foot bilateral.  Protective sensation intact.     Derm: Skin is atrophic, thin, at baseline discolored, mild edema, lack of pedal hair.  The left big toe does have partial black gangrene to the dorsal distal aspect.  There is no bone exposed on my evaluation.  The wound  seems to probe deep to subcutaneous tissue with some fibrotic tissue surrounding.  No purulence.  No bleeding.  No active drainage.  No ascending cellulitis.  No significant surrounding erythema.  Overall does not appear aggressively infected.     Ortho: Patient does have all 10 toes.      Last Recorded Vitals  Blood pressure 115/72, pulse 74, temperature 36.9 °C (98.4 °F), temperature source Temporal, resp. rate 16, height 1.829 m (6'), weight 108 kg (238 lb 1.6 oz), SpO2 96%.    Intake/Output last 3 Shifts:  I/O last 3 completed shifts:  In: 759.2 (7 mL/kg) [P.O.:200; I.V.:59.2 (0.5 mL/kg); IV Piggyback:500]  Out: 500 (4.6 mL/kg) [Urine:400 (0.1 mL/kg/hr); Blood:100]  Weight: 108 kg     Relevant Results       XR left foot 4/3: Radiologist did mention on the medial oblique view there may be a small suggestion of cortical destruction at the medial distal tuft to suggest osteomyelitis.  On my personal read I do not feel it directly correlates with osteomyelitis.     No leukocytosis  CRP mildly elevated on admission 2.5  ESR with mild elevation on admission 28     Blood cultures negative  Wound cultures none    Assessment/Plan   Assessment & Plan  Osteomyelitis of left foot (Multi)    Cough    Acute diverticulitis    Hypertension    PAD (peripheral artery disease) (CMS-[facility])    1.  Left foot gangrene  2.  Left foot ulcer to subcutaneous/fat layer  3.  Peripheral vascular disease      Has elected conservative care.  I discussed with infectious disease and we will likely do long-term p.o. antibiotics per infectious disease recommendations.  Will follow-up outpatient, at the [facility] wound care center with my colleague Dr. Mike Wen.  Continue daily Betadine and a light bandage. He is okay for discharge per podiatry.         Joselo Sapp DPM    I spent 30 minutes in the professional and overall care of this patient.

## 2025-04-08 NOTE — CARE PLAN
Problem: Skin  Goal: Decreased wound size/increased tissue granulation at next dressing change  Outcome: Progressing  Goal: Participates in plan/prevention/treatment measures  Outcome: Progressing  Goal: Prevent/minimize sheer/friction injuries  Outcome: Progressing  Goal: Promote/optimize nutrition  Outcome: Progressing  Goal: Promote skin healing  Outcome: Progressing     Problem: Pain  Goal: Turns in bed with improved pain control throughout the shift  Outcome: Progressing  Goal: Walks with improved pain control throughout the shift  Outcome: Progressing  Goal: Performs ADL's with improved pain control throughout shift  Outcome: Progressing  Goal: Participates in PT with improved pain control throughout the shift  Outcome: Progressing  Goal: Free from opioid side effects throughout the shift  Outcome: Progressing  Goal: Free from acute confusion related to pain meds throughout the shift  Outcome: Progressing     Problem: Fall/Injury  Goal: Not fall by end of shift  Outcome: Progressing  Goal: Be free from injury by end of the shift  Outcome: Progressing  Goal: Verbalize understanding of personal risk factors for fall in the hospital  Outcome: Progressing  Goal: Verbalize understanding of risk factor reduction measures to prevent injury from fall in the home  Outcome: Progressing  Goal: Use assistive devices by end of the shift  Outcome: Progressing  Goal: Pace activities to prevent fatigue by end of the shift  Outcome: Progressing   The patient's goals for the shift include      The clinical goals for the shift include monitor vs, labs, I&O's; manage pain; promote safe ambulation; safety; rest

## 2025-04-08 NOTE — PROGRESS NOTES
Connor Farr is a 59 y.o. male on day 5 of admission presenting with Osteomyelitis of left foot (Multi).    Subjective   Interval History:   Afebrile, no chills  Left foot pain controlled  No cough, chest pain or shortness of breath.  No nausea vomiting or diarrhea      Review of Systems   Skin:  Positive for wound.   All other systems reviewed and are negative.      Objective   Range of Vitals (last 24 hours)  Heart Rate:  [62-92]   Temp:  [36.1 °C (97 °F)-37.3 °C (99.1 °F)]   Resp:  [11-22]   BP: (102-139)/(62-90)   Weight:  [108 kg (238 lb 1.6 oz)]   SpO2:  [93 %-98 %]   Daily Weight  04/07/25 : 108 kg (238 lb 1.6 oz)    Body mass index is 32.29 kg/m².    Physical Exam  Constitutional:       Appearance: Normal appearance.   HENT:      Head: Normocephalic and atraumatic.      Nose: Nose normal.   Eyes:      General: No scleral icterus.  Cardiovascular:      Rate and Rhythm: Normal rate and regular rhythm.   Pulmonary:      Effort: Pulmonary effort is normal.      Breath sounds: Normal breath sounds.   Abdominal:      General: Bowel sounds are normal.      Palpations: Abdomen is soft.      Tenderness: There is no abdominal tenderness.   Musculoskeletal:         General: Normal range of motion.      Cervical back: Normal range of motion and neck supple.   Skin:     General: Skin is warm and dry.      Comments: Left hallux gangrene, left foot erythema    Neurological:      General: No focal deficit present.      Mental Status: He is alert.   Psychiatric:         Mood and Affect: Mood normal.         Behavior: Behavior normal.     Antibiotics  bacitracin - 500 unit/gram  piperacillin-tazobactam - 4.5 gram/100 mL  vancomycin - 1.5 gram/300 mL    Relevant Results  Labs  Results from last 72 hours   Lab Units 04/08/25  0439 04/07/25  1320 04/07/25  0620   WBC AUTO x10*3/uL 9.7  --  8.8   HEMOGLOBIN g/dL 12.1*  --  12.7*   HEMATOCRIT % 34.9*  --  37.5*   PLATELETS AUTO x10*3/uL 235 240 242     Results from last 72 hours    Lab Units 04/08/25  0439 04/07/25  0620 04/06/25  0607   SODIUM mmol/L 134* 136  --    POTASSIUM mmol/L 3.6 3.9  --    CHLORIDE mmol/L 102 105  --    CO2 mmol/L 23 23  --    BUN mg/dL 7 5*  --    CREATININE mg/dL 0.63 0.60 0.59   GLUCOSE mg/dL 109* 114*  --    CALCIUM mg/dL 8.5* 8.6  --    ANION GAP mmol/L 13 12  --    EGFR mL/min/1.73m*2 >90 >90 >90         Estimated Creatinine Clearance: 125 mL/min (by C-G formula based on SCr of 0.63 mg/dL).  C-Reactive Protein   Date Value Ref Range Status   04/03/2025 2.50 (H) <1.00 mg/dL Final     Microbiology  Reviewed-blood cultures pending  Imaging  Invasive vascular procedure    Result Date: 4/7/2025  This study is a surgery completed in an invasive cardiovascular space. Please see OpNote on Notes tab for findings.    CT abdomen and pelvis w oral contrast only    Result Date: 4/5/2025  Interpreted By:  Asiya Osborne, STUDY: CT ABDOMEN AND PELVIS W ORAL CONTRAST ONLY;  4/4/2025 6:39 pm   INDICATION: Signs/Symptoms:LLQ abdominal pain.   COMPARISON: CT abdomen pelvis 08/29/2023   ACCESSION NUMBER(S): [id number]   ORDERING CLINICIAN: SUELLEN AWAN   TECHNIQUE: Axial CT of the abdomen and pelvis was performed without intravenous contrast with coronal and sagittal reconstructions.   FINDINGS: Lower chest: Mild bilateral atelectasis dependent atelectasis without focal consolidation or pleural effusion.   Liver: Stable hepatomegaly measuring 19 cm in craniocaudal dimension. Hepatic steatosis. No mass.   Biliary: No intrahepatic or extrahepatic bile duct dilation. No cholelithiasis.   Spleen: Unremarkable.   Pancreas: Unremarkable.   Adrenals: Unremarkable.   Kidneys: Unremarkable.   GI tract: There is sigmoid diverticulosis with wall thickening in the proximal segment and moderate fat stranding surrounding a diverticulum consistent with acute diverticulitis (series 4, image 118). No adjacent organized fluid collection or free air is visualized. The remaining colon, appendix,  small bowel and stomach are unremarkable. A small hiatal hernia is noted.   Lymph nodes: Unremarkable.   Mesentery/peritoneum: No free fluid, free air or fluid collection.   Vasculature: Mild abdominal aortic atherosclerotic calcifications without aneurysmal dilation.   Pelvis: No free fluid, free air or fluid collection. Mild wall thickening along the anterior bladder dome likely reactive to the adjacent diverticulitis.   Bones/Soft tissues: Bilateral fat containing inguinal hernias, moderate-sized on the right and small on the left. Tiny fat containing umbilical hernia. Multilevel degenerative disc disease.       Acute uncomplicated sigmoid diverticulitis.   MACRO: None   Signed by: Asiya Osborne 4/5/2025 8:23 AM Dictation workstation:   [id number]    XR chest 2 views    Result Date: 4/4/2025  Interpreted By:  Marisela Dempsey, STUDY: XR CHEST 2 VIEWS 4/3/2025 9:02 pm   INDICATION: Signs/Symptoms:shortness of breath, cough x6 weeks   COMPARISON: 03/03/2025   ACCESSION NUMBER(S): [id number]   ORDERING CLINICIAN: GURDEEP ANTHONY   TECHNIQUE: AP erect and lateral views of the chest were acquired.   FINDINGS: Normal heart, mediastinum, and lungs. A small amount of calcified plaque within the aortic arch is seen. There are degenerative changes of the thoracic spine.       No acute cardiopulmonary disease.   Signed by: Marisela Dempsey 4/4/2025 9:01 AM Dictation workstation:   [id number]    XR foot left 3+ views    Result Date: 4/3/2025  Interpreted By:  Marisela Dempsey, STUDY: XR FOOT LEFT 3+ VIEWS 4/3/2025 4:58 pm   INDICATION: Discoloration of the great toe for 2 weeks. Swelling and erythema.   COMPARISON: None available.   ACCESSION NUMBER(S): [id number]   ORDERING CLINICIAN: ESPINOZA SIMPSON   TECHNIQUE: Three views of the left foot   FINDINGS: There is soft tissue swelling of the great toe with mild narrowing of the 1st MTP joint and with mild osteophytosis about this joint. There is a focal area of osteomyelitis along  the medial aspect of the tuft of the distal phalanx of the great toe suggested on the oblique view suspicious for small area of osteomyelitis at this site.   Plantar and posterior calcaneal spurs are present.       On oblique view, there is a small area of cortical destruction suggested along the medial aspect of the tuft of the distal phalanx of the great toe suggesting a small area of osteomyelitis at this site.   DJD of the 1st MTP joint.   Plantar and posterior calcaneal spurs.   Signed by: Marisela Dempsey 4/3/2025 5:07 PM Dictation workstation:   [id number]    CT angio aorta and bilateral iliofemoral runoff including without contrast if performed    Result Date: 4/1/2025  Interpreted By:  Mario Hall, STUDY: CT ANGIO AORTA AND BILATERAL ILIOFEMORAL RUN OFF INCLUDING WITHOUT CONTRAST IF PERFORMED;  3/31/2025 9:36 am   INDICATION: Signs/Symptoms:PVD   COMPARISON: 08/09/2023 CT abdomen and pelvis   ACCESSION NUMBER(S): [id number]   ORDERING CLINICIAN: DEEPA ROMAN   TECHNIQUE: Thin-section axial images of the abdomen and pelvis in the arterial phase after intravenous administration of  75 mL Omnipaque 350. Sagittal and coronal reconstructions. 3D reconstructions were obtained at a separate workstation.   FINDINGS: Vascular:  No aortic aneurysm or dissection.   The celiac trunk, superior mesenteric artery, renal arteries and inferior mesenteric artery are patent.   Right lower extremity: Right common iliac artery is widely patent. Internal iliac artery is patent. External iliac artery is occluded at its origin without reconstitution. There is reconstitution of the common femoral artery. Severe stenosis at the origin of the profundus with distal reconstitution. Superficial femoral artery is patent throughout. Popliteal artery is patent. Proximal tibial arteries are patent. The peroneal and posterior tibial arteries are patent to the ankle. The anterior tibial artery is attenuated along the distal leg.   Left lower  extremity: Left common iliac artery is patent. Internal iliac artery is occluded with some degree of distal reconstitution about the distal branches. External iliac artery is patent. Common femoral artery is patent. Profundus is patent. Superficial femoral artery is occluded proximally with distal reconstitution. Popliteal artery is patent. Three-vessel tibial runoff to the ankle.   LOWER CHEST: No acute abnormality of the lung bases. BONES: No acute osseous abnormality. ABDOMINAL WALL: Fat filled right inguinal hernia.   ABDOMEN:   LIVER: Within normal limits. BILE DUCTS: Normal caliber. GALLBLADDER: No calcified gallstones. No wall thickening. PANCREAS: Within normal limits. SPLEEN: Within normal limits. ADRENALS: Within normal limits. KIDNEYS and URETERS: Symmetric renal enhancement. No hydronephrosis.   RETROPERITONEUM: No pathologically enlarged retroperitoneal lymph nodes.   PELVIS:   REPRODUCTIVE ORGANS: No pelvic masses. BLADDER: Within normal limits.   BOWEL: No dilated bowel.  Normal appendix. PERITONEUM: No ascites or free air, no fluid collection.       1. Occlusion of the right external iliac artery with right common femoral artery reconstitution. Severe stenosis at the origin of the right profundus with distal reconstitution. Superficial femoral artery is patent. Two-vessel runoff to the ankle as above. 2. Left internal iliac artery occlusion with distal reconstitution. Left superficial femoral artery occluded proximally with distal reconstitution. Popliteal artery and distal tibial vessels patent with three-vessel runoff. 3. No acute process within the abdomen or pelvis.   MACRO: none   Signed by: Mario Hall 4/1/2025 10:31 AM Dictation workstation:   [id number]    Vascular US PVR with exercise    Result Date: 3/28/2025           [place] [id number]            Phone [phone]  Vascular Lab Report  [facility] US PVR WITH EXERCISE Patient Name:      LEIGHANN  KELLEN       Reading Physician:  [id number] Rosario Draper MD Study Date:        3/28/2025           Ordering Provider:  [id number] RAFAL GUTIERRES MRN/PID:           [id number]            Fellow: Accession#:        [id number]        Technologist:       Gabi Mcfadden RVJOSE Date of Birth/Age: 1965 / 59     Technologist 2:                    years Gender:            M                   Encounter#:         [id number] Admission Status:  Outpatient          Location Performed: [facility]  Diagnosis/ICD: Atherosclerosis of native arteries of extremities with                intermittent claudication, bilateral legs-I70.213 CPT Codes:     76967 Peripheral artery PVR with exercise  Pertinent History: Purple discoloration of all left toes.  CONCLUSIONS: Right Lower PVR: There is evidence of mild multi vessel disease. Hemodynamics are further compromised in the right lower extremity with exercise. Decreased digital perfusion noted. Monophasic flow is noted in the right common femoral artery, right popliteal artery, right posterior tibial artery and right dorsalis pedis artery. Left Lower PVR: There is evidence of mild multi-vessel disease. Hemodynamics are further compromised in the left lower extremity with exercise. Decreased digital perfusion noted. Monophasic flow is noted in the left common femoral artery, left popliteal artery and left dorsalis pedis artery. Left posterior tibial artery is not audible. Exercise:Exercise completed at 10% grade and 2 miles per hour for 2 minutes. The patient experienced pain in the right calf and the left calf at 1.5 minutes. The exam was terminated due to extremity pain.  Imaging & Doppler Findings:  RIGHT Lower PVR                Pressures Ratios Right High Thigh               142 mmHg  1.10 Right Low Thigh                125 mmHg  0.97 Right Calf                     104 mmHg  0.81 Right Posterior Tibial (Ankle) 105 mmHg  0.81  Right Dorsalis Pedis (Ankle)   109 mmHg  0.84 Right Digit (Great Toe)        62 mmHg   0.48  Right Ankle Post Exercise      64 mmHg   0.40 5 Minute Pressure              90 mmHg   0.64  LEFT Lower PVR              Pressures Ratios Left High Thigh             135 mmHg  1.05 Left Low Thigh              111 mmHg  0.86 Left Calf                   96 mmHg   0.74 Left Dorsalis Pedis (Ankle) 91 mmHg   0.71 Left Digit (Great Toe)      41 mmHg   0.32  Left Ankle Post Exercise    53 mmHg   0.33 5 Minute Pressure           54 mmHg   0.38                     Right     Left Brachial Pressure 121 mmHg 129 mmHg   [id number] Rosario Draper MD Electronically signed by [id number] Rosario Draper MD on 3/28/2025 at 12:09:30 PM  ** Final **      Assessment/Plan   Left hallux gangrene  Left foot cellulitis  Abnormal x-ray-interval discussion with podiatry as noted in podiatry notes, osteomyelitis not clinically apparent-MRI not ordered because per podiatry, patient has implants.  We agreed to place patient on 4 to 6 weeks of oral antibiotic, doxycycline and Augmentin  Peripheral arterial disease with left limb subacute ischemia, s/p angiogram with stent  Acute uncomplicated sigmoid diverticulitis      Continue IV zosyn, while inpatient  Continue IV vancomycin, while inpatient  Monitor WBC and temperature  Monitor renal function  Follow-up blood cultures  Vascular follow-up  Podiatry follow-up  Discharge planning-will confirm plan with patient tomorrow and order oral antibiotic if patient agrees  Patient will follow-up with podiatry at [facility] wound healing [place]  Time spent reviewing chart, examining patient and formulating management plan is 35 minutes    Brayan Clemens MD

## 2025-04-09 ENCOUNTER — PHARMACY VISIT (OUTPATIENT)
Dept: PHARMACY | Facility: CLINIC | Age: 60
End: 2025-04-09
Payer: COMMERCIAL

## 2025-04-09 VITALS
DIASTOLIC BLOOD PRESSURE: 70 MMHG | HEIGHT: 72 IN | TEMPERATURE: 98.2 F | BODY MASS INDEX: 31.98 KG/M2 | HEART RATE: 71 BPM | SYSTOLIC BLOOD PRESSURE: 107 MMHG | RESPIRATION RATE: 18 BRPM | WEIGHT: 236.11 LBS | OXYGEN SATURATION: 93 %

## 2025-04-09 LAB
ANION GAP SERPL CALCULATED.3IONS-SCNC: 11 MMOL/L (ref 10–20)
APTT PPP: 37.2 SECONDS (ref 22–32.5)
BUN SERPL-MCNC: 7 MG/DL (ref 6–23)
CALCIUM SERPL-MCNC: 8.8 MG/DL (ref 8.6–10.3)
CHLORIDE SERPL-SCNC: 104 MMOL/L (ref 98–107)
CO2 SERPL-SCNC: 24 MMOL/L (ref 21–32)
CREAT SERPL-MCNC: 0.6 MG/DL (ref 0.5–1.3)
EGFRCR SERPLBLD CKD-EPI 2021: >90 ML/MIN/1.73M*2
ERYTHROCYTE [DISTWIDTH] IN BLOOD BY AUTOMATED COUNT: 13.2 % (ref 11.5–14.5)
GLUCOSE SERPL-MCNC: 137 MG/DL (ref 74–99)
HCT VFR BLD AUTO: 33.4 % (ref 41–52)
HGB BLD-MCNC: 11.4 G/DL (ref 13.5–17.5)
MCH RBC QN AUTO: 31.8 PG (ref 26–34)
MCHC RBC AUTO-ENTMCNC: 34.1 G/DL (ref 32–36)
MCV RBC AUTO: 93 FL (ref 80–100)
NRBC BLD-RTO: 0 /100 WBCS (ref 0–0)
PLATELET # BLD AUTO: 212 X10*3/UL (ref 150–450)
POTASSIUM SERPL-SCNC: 3.5 MMOL/L (ref 3.5–5.3)
RBC # BLD AUTO: 3.59 X10*6/UL (ref 4.5–5.9)
SODIUM SERPL-SCNC: 135 MMOL/L (ref 136–145)
VANCOMYCIN SERPL-MCNC: 17.3 UG/ML (ref 5–20)
WBC # BLD AUTO: 8.9 X10*3/UL (ref 4.4–11.3)

## 2025-04-09 PROCEDURE — 80202 ASSAY OF VANCOMYCIN: CPT | Performed by: INTERNAL MEDICINE

## 2025-04-09 PROCEDURE — 2500000004 HC RX 250 GENERAL PHARMACY W/ HCPCS (ALT 636 FOR OP/ED): Performed by: INTERNAL MEDICINE

## 2025-04-09 PROCEDURE — 2500000002 HC RX 250 W HCPCS SELF ADMINISTERED DRUGS (ALT 637 FOR MEDICARE OP, ALT 636 FOR OP/ED): Performed by: INTERNAL MEDICINE

## 2025-04-09 PROCEDURE — 2500000004 HC RX 250 GENERAL PHARMACY W/ HCPCS (ALT 636 FOR OP/ED): Performed by: NURSE PRACTITIONER

## 2025-04-09 PROCEDURE — RXMED WILLOW AMBULATORY MEDICATION CHARGE

## 2025-04-09 PROCEDURE — 82374 ASSAY BLOOD CARBON DIOXIDE: CPT | Performed by: INTERNAL MEDICINE

## 2025-04-09 PROCEDURE — 85730 THROMBOPLASTIN TIME PARTIAL: CPT | Performed by: NURSE PRACTITIONER

## 2025-04-09 PROCEDURE — 2500000002 HC RX 250 W HCPCS SELF ADMINISTERED DRUGS (ALT 637 FOR MEDICARE OP, ALT 636 FOR OP/ED): Performed by: NURSE PRACTITIONER

## 2025-04-09 PROCEDURE — 36415 COLL VENOUS BLD VENIPUNCTURE: CPT | Performed by: INTERNAL MEDICINE

## 2025-04-09 PROCEDURE — 2500000001 HC RX 250 WO HCPCS SELF ADMINISTERED DRUGS (ALT 637 FOR MEDICARE OP): Performed by: NURSE PRACTITIONER

## 2025-04-09 PROCEDURE — 36415 COLL VENOUS BLD VENIPUNCTURE: CPT | Performed by: NURSE PRACTITIONER

## 2025-04-09 PROCEDURE — 85027 COMPLETE CBC AUTOMATED: CPT | Performed by: INTERNAL MEDICINE

## 2025-04-09 PROCEDURE — 2500000001 HC RX 250 WO HCPCS SELF ADMINISTERED DRUGS (ALT 637 FOR MEDICARE OP): Performed by: INTERNAL MEDICINE

## 2025-04-09 PROCEDURE — S4991 NICOTINE PATCH NONLEGEND: HCPCS | Performed by: INTERNAL MEDICINE

## 2025-04-09 PROCEDURE — 99239 HOSP IP/OBS DSCHRG MGMT >30: CPT | Performed by: INTERNAL MEDICINE

## 2025-04-09 RX ORDER — AMOXICILLIN AND CLAVULANATE POTASSIUM 875; 125 MG/1; MG/1
1 TABLET, FILM COATED ORAL EVERY 12 HOURS
Qty: 56 TABLET | Refills: 0 | Status: SHIPPED | OUTPATIENT
Start: 2025-04-09 | End: 2025-05-07

## 2025-04-09 RX ORDER — IBUPROFEN 200 MG
1 TABLET ORAL DAILY
Qty: 28 PATCH | Refills: 0 | Status: SHIPPED | OUTPATIENT
Start: 2025-04-09 | End: 2025-05-09

## 2025-04-09 RX ORDER — ASPIRIN 81 MG/1
81 TABLET ORAL DAILY
Qty: 30 TABLET | Refills: 0 | Status: SHIPPED | OUTPATIENT
Start: 2025-04-10 | End: 2025-05-10

## 2025-04-09 RX ORDER — DOXYCYCLINE HYCLATE 100 MG
100 TABLET ORAL EVERY 12 HOURS
Qty: 56 TABLET | Refills: 0 | Status: SHIPPED | OUTPATIENT
Start: 2025-04-09 | End: 2025-05-07

## 2025-04-09 RX ADMIN — HEPARIN SODIUM 2100 UNITS/HR: 10000 INJECTION, SOLUTION INTRAVENOUS at 06:09

## 2025-04-09 RX ADMIN — RIVAROXABAN 20 MG: 20 TABLET, FILM COATED ORAL at 13:59

## 2025-04-09 RX ADMIN — ASPIRIN 81 MG: 81 TABLET, COATED ORAL at 09:25

## 2025-04-09 RX ADMIN — PANTOPRAZOLE SODIUM 20 MG: 20 TABLET, DELAYED RELEASE ORAL at 06:06

## 2025-04-09 RX ADMIN — PIPERACILLIN SODIUM AND TAZOBACTAM SODIUM 4.5 G: 4; .5 INJECTION, SOLUTION INTRAVENOUS at 13:36

## 2025-04-09 RX ADMIN — NICOTINE 1 PATCH: 14 PATCH, EXTENDED RELEASE TRANSDERMAL at 09:00

## 2025-04-09 RX ADMIN — OXYCODONE HYDROCHLORIDE AND ACETAMINOPHEN 1 TABLET: 5; 325 TABLET ORAL at 06:06

## 2025-04-09 RX ADMIN — VANCOMYCIN 1.5 G: 1.5 INJECTION, SOLUTION INTRAVENOUS at 09:26

## 2025-04-09 RX ADMIN — PIPERACILLIN SODIUM AND TAZOBACTAM SODIUM 4.5 G: 4; .5 INJECTION, SOLUTION INTRAVENOUS at 03:11

## 2025-04-09 ASSESSMENT — COGNITIVE AND FUNCTIONAL STATUS - GENERAL
MOBILITY SCORE: 24
DAILY ACTIVITIY SCORE: 24

## 2025-04-09 ASSESSMENT — PAIN SCALES - GENERAL
PAINLEVEL_OUTOF10: 0 - NO PAIN
PAINLEVEL_OUTOF10: 7

## 2025-04-09 ASSESSMENT — ENCOUNTER SYMPTOMS: WOUND: 1

## 2025-04-09 NOTE — PROGRESS NOTES
Connor Farr is a 59 y.o. male on day 6 of admission presenting with Osteomyelitis of left foot (Multi).    Subjective   Patient refusing amputation.  Wants to do conservative treatment with antibiotic  Left toe wound is looking much better today  Patient s/p angiogram   Also with acute diverticulitis symptoms are doing better        Objective     Physical Exam  Vitals reviewed.   Constitutional:       Appearance: Normal appearance.   HENT:      Head: Normocephalic and atraumatic.      Right Ear: Tympanic membrane, ear canal and external ear normal.      Left Ear: Tympanic membrane, ear canal and external ear normal.      Nose: Nose normal.      Mouth/Throat:      Pharynx: Oropharynx is clear.   Eyes:      Extraocular Movements: Extraocular movements intact.      Conjunctiva/sclera: Conjunctivae normal.      Pupils: Pupils are equal, round, and reactive to light.   Cardiovascular:      Rate and Rhythm: Normal rate and regular rhythm.      Pulses: Normal pulses.      Heart sounds: Normal heart sounds.   Pulmonary:      Effort: Pulmonary effort is normal.      Breath sounds: Normal breath sounds.   Abdominal:      General: Abdomen is flat. Bowel sounds are normal.      Palpations: Abdomen is soft.   Musculoskeletal:      Cervical back: Normal range of motion and neck supple.      Comments: Gangrene left big toe   Skin:     General: Skin is warm and dry.   Neurological:      General: No focal deficit present.      Mental Status: He is alert and oriented to person, place, and time.   Psychiatric:         Mood and Affect: Mood normal.         Last Recorded Vitals  Blood pressure 107/70, pulse 71, temperature 36.8 °C (98.2 °F), temperature source Oral, resp. rate 18, height 1.829 m (6'), weight 107 kg (236 lb 1.8 oz), SpO2 93%.  Intake/Output last 3 Shifts:  I/O last 3 completed shifts:  In: 349.2 (3.3 mL/kg) [P.O.:240; I.V.:59.2 (0.6 mL/kg); IV Piggyback:50]  Out: - (0 mL/kg)   Weight: 107.1 kg     Relevant  Results               Scheduled medications  aspirin, 81 mg, oral, Daily  ipratropium-albuteroL, 3 mL, nebulization, Once  ipratropium-albuteroL, 3 mL, nebulization, Once  levothyroxine, 75 mcg, oral, Nightly  losartan, 100 mg, oral, Nightly  metoprolol succinate XL, 100 mg, oral, Nightly  nicotine, 1 patch, transdermal, Daily  pantoprazole, 20 mg, oral, Daily before breakfast  piperacillin-tazobactam, 4.5 g, intravenous, q6h  rosuvastatin, 20 mg, oral, Nightly  vancomycin, 1,500 mg, intravenous, q12h      Continuous medications       PRN medications  PRN medications: albuterol, cyclobenzaprine, HYDROmorphone, oxyCODONE-acetaminophen, vancomycin  Results for orders placed or performed during the hospital encounter of 04/03/25 (from the past 24 hours)   aPTT   Result Value Ref Range    aPTT 25.4 22.0 - 32.5 seconds   aPTT   Result Value Ref Range    aPTT 37.2 (H) 22.0 - 32.5 seconds   Vancomycin   Result Value Ref Range    Vancomycin 17.3 5.0 - 20.0 ug/mL   CBC   Result Value Ref Range    WBC 8.9 4.4 - 11.3 x10*3/uL    nRBC 0.0 0.0 - 0.0 /100 WBCs    RBC 3.59 (L) 4.50 - 5.90 x10*6/uL    Hemoglobin 11.4 (L) 13.5 - 17.5 g/dL    Hematocrit 33.4 (L) 41.0 - 52.0 %    MCV 93 80 - 100 fL    MCH 31.8 26.0 - 34.0 pg    MCHC 34.1 32.0 - 36.0 g/dL    RDW 13.2 11.5 - 14.5 %    Platelets 212 150 - 450 x10*3/uL   Basic Metabolic Panel   Result Value Ref Range    Glucose 137 (H) 74 - 99 mg/dL    Sodium 135 (L) 136 - 145 mmol/L    Potassium 3.5 3.5 - 5.3 mmol/L    Chloride 104 98 - 107 mmol/L    Bicarbonate 24 21 - 32 mmol/L    Anion Gap 11 10 - 20 mmol/L    Urea Nitrogen 7 6 - 23 mg/dL    Creatinine 0.60 0.50 - 1.30 mg/dL    eGFR >90 >60 mL/min/1.73m*2    Calcium 8.8 8.6 - 10.3 mg/dL     *Note: Due to a large number of results and/or encounters for the requested time period, some results have not been displayed. A complete set of results can be found in Results Review.     No results found.                 Assessment/Plan    Assessment & Plan  Osteomyelitis of left foot (Multi)    Cough    PAD (peripheral artery disease) (CMS-HCC)    Acute diverticulitis    Hypertension    Patient does not want to do toe amputation  Podiatry input noted  ID input noted  Plan for oral antibiotic for 1 month  S/p angio left leg  Acute diverticulitis doing better  Discussed with   No need for home care  Emphasized patient not to smoke not to drink  Follow-up with PCP in a week         I spent  minutes in the professional and overall care of this patient.      Love Esqueda MD

## 2025-04-09 NOTE — CARE PLAN
The patient's goals for the shift include      The clinical goals for the shift include monitor vs, labs, I&O's; manage pain; promote safe ambulation; safety; rest

## 2025-04-09 NOTE — PROGRESS NOTES
Connor Farr is a 59 y.o. male on day 6 of admission presenting with Osteomyelitis of left foot (Multi).    Subjective   Interval History:   Afebrile, no chills  Denies any  foot pain currently  Denies nausea, vomiting, abdominal pain, diarrhea  Denies shortness of breath, cough, chest pain    Review of Systems   Skin:  Positive for wound.   All other systems reviewed and are negative.      Objective   Range of Vitals (last 24 hours)  Heart Rate:  [71-85]   Temp:  [36.2 °C (97.2 °F)-36.9 °C (98.4 °F)]   Resp:  [16-18]   BP: ()/(67-83)   Weight:  [107 kg (236 lb 1.8 oz)]   SpO2:  [91 %-96 %]   Daily Weight  04/09/25 : 107 kg (236 lb 1.8 oz)    Body mass index is 32.02 kg/m².    Physical Exam  Constitutional:       Appearance: Normal appearance.   HENT:      Head: Normocephalic and atraumatic.      Nose: Nose normal.   Eyes:      General: No scleral icterus.  Cardiovascular:      Rate and Rhythm: Normal rate and regular rhythm.   Pulmonary:      Effort: Pulmonary effort is normal.      Breath sounds: Normal breath sounds.   Abdominal:      General: Bowel sounds are normal.      Palpations: Abdomen is soft.      Tenderness: There is no tenderness.  Musculoskeletal:         General: Normal range of motion.      Cervical back: Normal range of motion and neck supple.   Skin:     General: Skin is warm and dry.      Comments: Left foot dressing   Neurological:      General: No focal deficit present.      Mental Status: He is alert.   Psychiatric:         Mood and Affect: Mood normal.         Behavior: Behavior normal.     Antibiotics  bacitracin - 500 unit/gram  piperacillin-tazobactam - 4.5 gram/100 mL  vancomycin - 1.5 gram/300 mL    Relevant Results  Labs  Results from last 72 hours   Lab Units 04/09/25  0558 04/08/25  0439 04/07/25  1320 04/07/25  0620   WBC AUTO x10*3/uL 8.9 9.7  --  8.8   HEMOGLOBIN g/dL 11.4* 12.1*  --  12.7*   HEMATOCRIT % 33.4* 34.9*  --  37.5*   PLATELETS AUTO x10*3/uL 212 235 240 242      Results from last 72 hours   Lab Units 04/09/25  0558 04/08/25  0439 04/07/25  0620   SODIUM mmol/L 135* 134* 136   POTASSIUM mmol/L 3.5 3.6 3.9   CHLORIDE mmol/L 104 102 105   CO2 mmol/L 24 23 23   BUN mg/dL 7 7 5*   CREATININE mg/dL 0.60 0.63 0.60   GLUCOSE mg/dL 137* 109* 114*   CALCIUM mg/dL 8.8 8.5* 8.6   ANION GAP mmol/L 11 13 12   EGFR mL/min/1.73m*2 >90 >90 >90         Estimated Creatinine Clearance: 125 mL/min (by C-G formula based on SCr of 0.6 mg/dL).  C-Reactive Protein   Date Value Ref Range Status   04/03/2025 2.50 (H) <1.00 mg/dL Final     Microbiology  Reviewed-blood cultures finalized no growth  Imaging  Invasive vascular procedure    Result Date: 4/7/2025  This study is a surgery completed in an invasive cardiovascular space. Please see OpNote on Notes tab for findings.    CT abdomen and pelvis w oral contrast only    Result Date: 4/5/2025  Interpreted By:  Asiya Osborne, STUDY: CT ABDOMEN AND PELVIS W ORAL CONTRAST ONLY;  4/4/2025 6:39 pm   INDICATION: Signs/Symptoms:LLQ abdominal pain.   COMPARISON: CT abdomen pelvis 08/29/2023   ACCESSION NUMBER(S): [id number]   ORDERING CLINICIAN: SUELLEN STAFF   TECHNIQUE: Axial CT of the abdomen and pelvis was performed without intravenous contrast with coronal and sagittal reconstructions.   FINDINGS: Lower chest: Mild bilateral atelectasis dependent atelectasis without focal consolidation or pleural effusion.   Liver: Stable hepatomegaly measuring 19 cm in craniocaudal dimension. Hepatic steatosis. No mass.   Biliary: No intrahepatic or extrahepatic bile duct dilation. No cholelithiasis.   Spleen: Unremarkable.   Pancreas: Unremarkable.   Adrenals: Unremarkable.   Kidneys: Unremarkable.   GI tract: There is sigmoid diverticulosis with wall thickening in the proximal segment and moderate fat stranding surrounding a diverticulum consistent with acute diverticulitis (series 4, image 118). No adjacent organized fluid collection or free air is visualized.  The remaining colon, appendix, small bowel and stomach are unremarkable. A small hiatal hernia is noted.   Lymph nodes: Unremarkable.   Mesentery/peritoneum: No free fluid, free air or fluid collection.   Vasculature: Mild abdominal aortic atherosclerotic calcifications without aneurysmal dilation.   Pelvis: No free fluid, free air or fluid collection. Mild wall thickening along the anterior bladder dome likely reactive to the adjacent diverticulitis.   Bones/Soft tissues: Bilateral fat containing inguinal hernias, moderate-sized on the right and small on the left. Tiny fat containing umbilical hernia. Multilevel degenerative disc disease.       Acute uncomplicated sigmoid diverticulitis.   MACRO: None   Signed by: Asiya Osborne 4/5/2025 8:23 AM Dictation workstation:   [id number]    XR chest 2 views    Result Date: 4/4/2025  Interpreted By:  Marisela Dempsey, STUDY: XR CHEST 2 VIEWS 4/3/2025 9:02 pm   INDICATION: Signs/Symptoms:shortness of breath, cough x6 weeks   COMPARISON: 03/03/2025   ACCESSION NUMBER(S): [id number]   ORDERING CLINICIAN: GURDEEP ANTHONY   TECHNIQUE: AP erect and lateral views of the chest were acquired.   FINDINGS: Normal heart, mediastinum, and lungs. A small amount of calcified plaque within the aortic arch is seen. There are degenerative changes of the thoracic spine.       No acute cardiopulmonary disease.   Signed by: Marisela Dempsey 4/4/2025 9:01 AM Dictation workstation:   [id number]    XR foot left 3+ views    Result Date: 4/3/2025  Interpreted By:  Marisela Dempsey, STUDY: XR FOOT LEFT 3+ VIEWS 4/3/2025 4:58 pm   INDICATION: Discoloration of the great toe for 2 weeks. Swelling and erythema.   COMPARISON: None available.   ACCESSION NUMBER(S): [id number]   ORDERING CLINICIAN: ESPINOZA SIMPSON   TECHNIQUE: Three views of the left foot   FINDINGS: There is soft tissue swelling of the great toe with mild narrowing of the 1st MTP joint and with mild osteophytosis about this joint. There is a focal  area of osteomyelitis along the medial aspect of the tuft of the distal phalanx of the great toe suggested on the oblique view suspicious for small area of osteomyelitis at this site.   Plantar and posterior calcaneal spurs are present.       On oblique view, there is a small area of cortical destruction suggested along the medial aspect of the tuft of the distal phalanx of the great toe suggesting a small area of osteomyelitis at this site.   DJD of the 1st MTP joint.   Plantar and posterior calcaneal spurs.   Signed by: Marisela Dempsey 4/3/2025 5:07 PM Dictation workstation:   [id number]    CT angio aorta and bilateral iliofemoral runoff including without contrast if performed    Result Date: 4/1/2025  Interpreted By:  Mario Hall, STUDY: CT ANGIO AORTA AND BILATERAL ILIOFEMORAL RUN OFF INCLUDING WITHOUT CONTRAST IF PERFORMED;  3/31/2025 9:36 am   INDICATION: Signs/Symptoms:PVD   COMPARISON: 08/09/2023 CT abdomen and pelvis   ACCESSION NUMBER(S): [id number]   ORDERING CLINICIAN: DEEPA ROMAN   TECHNIQUE: Thin-section axial images of the abdomen and pelvis in the arterial phase after intravenous administration of  75 mL Omnipaque 350. Sagittal and coronal reconstructions. 3D reconstructions were obtained at a separate workstation.   FINDINGS: Vascular:  No aortic aneurysm or dissection.   The celiac trunk, superior mesenteric artery, renal arteries and inferior mesenteric artery are patent.   Right lower extremity: Right common iliac artery is widely patent. Internal iliac artery is patent. External iliac artery is occluded at its origin without reconstitution. There is reconstitution of the common femoral artery. Severe stenosis at the origin of the profundus with distal reconstitution. Superficial femoral artery is patent throughout. Popliteal artery is patent. Proximal tibial arteries are patent. The peroneal and posterior tibial arteries are patent to the ankle. The anterior tibial artery is attenuated along  the distal leg.   Left lower extremity: Left common iliac artery is patent. Internal iliac artery is occluded with some degree of distal reconstitution about the distal branches. External iliac artery is patent. Common femoral artery is patent. Profundus is patent. Superficial femoral artery is occluded proximally with distal reconstitution. Popliteal artery is patent. Three-vessel tibial runoff to the ankle.   LOWER CHEST: No acute abnormality of the lung bases. BONES: No acute osseous abnormality. ABDOMINAL WALL: Fat filled right inguinal hernia.   ABDOMEN:   LIVER: Within normal limits. BILE DUCTS: Normal caliber. GALLBLADDER: No calcified gallstones. No wall thickening. PANCREAS: Within normal limits. SPLEEN: Within normal limits. ADRENALS: Within normal limits. KIDNEYS and URETERS: Symmetric renal enhancement. No hydronephrosis.   RETROPERITONEUM: No pathologically enlarged retroperitoneal lymph nodes.   PELVIS:   REPRODUCTIVE ORGANS: No pelvic masses. BLADDER: Within normal limits.   BOWEL: No dilated bowel.  Normal appendix. PERITONEUM: No ascites or free air, no fluid collection.       1. Occlusion of the right external iliac artery with right common femoral artery reconstitution. Severe stenosis at the origin of the right profundus with distal reconstitution. Superficial femoral artery is patent. Two-vessel runoff to the ankle as above. 2. Left internal iliac artery occlusion with distal reconstitution. Left superficial femoral artery occluded proximally with distal reconstitution. Popliteal artery and distal tibial vessels patent with three-vessel runoff. 3. No acute process within the abdomen or pelvis.   MACRO: none   Signed by: Mario Hall 4/1/2025 10:31 AM Dictation workstation:   [id number]    Vascular US PVR with exercise    Result Date: 3/28/2025           [place] [id number]            Phone [phone]  Vascular Lab Report  [facility] US PVR WITH  EXERCISE Patient Name:      LEIGHANN FOREMAN       Reading Physician:  [id number] Rosario Draper MD Study Date:        3/28/2025           Ordering Provider:  [id number] RAFAL GUTIERRES MRN/PID:           [id number]            Fellow: Accession#:        [id number]        Technologist:       Gabi Mcfadden RVJOSE Date of Birth/Age: 1965 / 59     Technologist 2:                    years Gender:            M                   Encounter#:         [id number] Admission Status:  Outpatient          Location Performed: [facility]  Diagnosis/ICD: Atherosclerosis of native arteries of extremities with                intermittent claudication, bilateral legs-I70.213 CPT Codes:     57487 Peripheral artery PVR with exercise  Pertinent History: Purple discoloration of all left toes.  CONCLUSIONS: Right Lower PVR: There is evidence of mild multi vessel disease. Hemodynamics are further compromised in the right lower extremity with exercise. Decreased digital perfusion noted. Monophasic flow is noted in the right common femoral artery, right popliteal artery, right posterior tibial artery and right dorsalis pedis artery. Left Lower PVR: There is evidence of mild multi-vessel disease. Hemodynamics are further compromised in the left lower extremity with exercise. Decreased digital perfusion noted. Monophasic flow is noted in the left common femoral artery, left popliteal artery and left dorsalis pedis artery. Left posterior tibial artery is not audible. Exercise:Exercise completed at 10% grade and 2 miles per hour for 2 minutes. The patient experienced pain in the right calf and the left calf at 1.5 minutes. The exam was terminated due to extremity pain.  Imaging & Doppler Findings:  RIGHT Lower PVR                Pressures Ratios Right High Thigh               142 mmHg  1.10 Right Low Thigh                125 mmHg  0.97 Right Calf                     104 mmHg  0.81 Right  Posterior Tibial (Ankle) 105 mmHg  0.81 Right Dorsalis Pedis (Ankle)   109 mmHg  0.84 Right Digit (Great Toe)        62 mmHg   0.48  Right Ankle Post Exercise      64 mmHg   0.40 5 Minute Pressure              90 mmHg   0.64  LEFT Lower PVR              Pressures Ratios Left High Thigh             135 mmHg  1.05 Left Low Thigh              111 mmHg  0.86 Left Calf                   96 mmHg   0.74 Left Dorsalis Pedis (Ankle) 91 mmHg   0.71 Left Digit (Great Toe)      41 mmHg   0.32  Left Ankle Post Exercise    53 mmHg   0.33 5 Minute Pressure           54 mmHg   0.38                     Right     Left Brachial Pressure 121 mmHg 129 mmHg   [id number] Rosario Draper MD Electronically signed by [id number] Rosario Draper MD on 3/28/2025 at 12:09:30 PM  ** Final **        Assessment/Plan   Left hallux gangrene  Left foot cellulitis  Abnormal x-ray-interval discussion with podiatry as noted in podiatry notes, osteomyelitis not clinically apparent-MRI not ordered because per podiatry, patient has implants.  Plan is 4 to 6 weeks of oral antibiotic, doxycycline and Augmentin  Peripheral arterial disease with left limb subacute ischemia, s/p angiogram with stent  Acute uncomplicated sigmoid diverticulitis      Continue IV zosyn, while inpatient  Continue IV vancomycin, while inpatient  Monitor WBC and temperature  Monitor renal function  Discharge planning-will confirm plan with patient tomorrow and order oral antibiotic if patient agrees  Patient will follow-up with podiatry at [facility]  Discharge plan - augmentin and doxycycline for 4 weeks patient is agreeable, eprescribed. OK to discharge from ID standpoint.      Discussed with Dr. Clemens    Total time spent caring for the patient today was 30 minutes.  This includes time spent before the visit reviewing the chart, time spent during the visit, and time spent after the visit on documentation.      Carolynn Escobar, APRN-CNP

## 2025-04-09 NOTE — PROGRESS NOTES
04/09/25 1333   Discharge Planning   Expected Discharge Disposition Home     Met with patient at bedside to discuss discharge planning. Patient has an active discharge order.   Will follow up for wound care at the wound clinic on Wednesday. Will discharge home on oral antibiotics. Patient is declining home going needs, declining HHC. Stated he has betadine at home and is able to do his own dressing changes.

## 2025-04-09 NOTE — CARE PLAN
The patient's goals for the shift include      The clinical goals for the shift include pain management, IV antibiotics    Over the shift, the patient did not make progress toward the following goals. Barriers to progression include  Recommendations to address these barriers include   Problem: Pain  Goal: Turns in bed with improved pain control throughout the shift  Outcome: Progressing     Problem: Pain  Goal: Performs ADL's with improved pain control throughout shift  Outcome: Progressing

## 2025-04-09 NOTE — CARE PLAN
The patient's goals for the shift include      The clinical goals for the shift include pain management, IV antibiotics    Over the shift, the patient did not make progress toward the following goals. Barriers to progression include Recommendations to address these barriers include   Problem: Pain  Goal: Turns in bed with improved pain control throughout the shift  Outcome: Progressing  Goal: Walks with improved pain control throughout the shift  Outcome: Progressing  Goal: Performs ADL's with improved pain control throughout shift  Outcome: Progressing  Goal: Participates in PT with improved pain control throughout the shift  Outcome: Progressing  Goal: Free from opioid side effects throughout the shift  Outcome: Progressing  Goal: Free from acute confusion related to pain meds throughout the shift  Outcome: Progressing

## 2025-04-09 NOTE — DISCHARGE SUMMARY
Discharge Diagnosis  Osteomyelitis of left foot (Multi)    Issues Requiring Follow-Up  Peripheral arterial disease  Acute diverticulitis  Smoking  Alcohol use    Test Results Pending At Discharge  Pending Labs       No current pending labs.            Hospital Course   Connor Farr is a 59 y.o. male presenting with left great toe necrosis from his vascular surgery appointment. Pain and swelling in the left great toe progressively worsening over last few weeks despite augmentin antibiotic at home. Labs now show mild leukocytosis and elevated CRP. Blood cultures in process. Started on empiric IV Vancomycin and Zosyn for suspect osteomyelitis noted on Xray today.   Patient had vascular studies done and noted to have thromboembolism in the tibioperoneal trunk bifurcation suction thrombectomy with resolution.  Stent placed to proximal CFA.  Occluded right PFA.  Patient started on anticoagulant initially in the hospital treated with heparin and switched to Xarelto Plavix and aspirin.  He was treated with IV antibiotics and switched to oral antibiotics for a month.  Podiatry saw the patient and recommended to amputation.  Patient wanted conservative treatment and if failed then we will do surgery.  Emphasized patient to quit smoking and quit drinking.  At discharge his condition was looking improved.  Home care patient declined as he does not need IV antibiotics anymore.  Follow-up in the office in a week with PCP       Pertinent Physical Exam At Time of Discharge  Physical Exam  Vitals reviewed.   Constitutional:       Appearance: Normal appearance.   HENT:      Head: Normocephalic and atraumatic.      Right Ear: Tympanic membrane, ear canal and external ear normal.      Left Ear: Tympanic membrane, ear canal and external ear normal.      Nose: Nose normal.      Mouth/Throat:      Pharynx: Oropharynx is clear.   Eyes:      Extraocular Movements: Extraocular movements intact.      Conjunctiva/sclera: Conjunctivae normal.       Pupils: Pupils are equal, round, and reactive to light.   Cardiovascular:      Rate and Rhythm: Normal rate and regular rhythm.      Pulses: Normal pulses.      Heart sounds: Normal heart sounds.   Pulmonary:      Effort: Pulmonary effort is normal.      Breath sounds: Normal breath sounds.   Abdominal:      General: Abdomen is flat. Bowel sounds are normal.      Palpations: Abdomen is soft.   Musculoskeletal:      Cervical back: Normal range of motion and neck supple.   Skin:     General: Skin is warm and dry.      Findings: Lesion present.      Comments: Left big toe gangrene   Neurological:      General: No focal deficit present.      Mental Status: He is alert and oriented to person, place, and time.   Psychiatric:         Mood and Affect: Mood normal.         Home Medications     Medication List      START taking these medications     aspirin 81 mg EC tablet; Take 1 tablet (81 mg) by mouth once daily.;   Start taking on: April 10, 2025   clopidogrel 75 mg tablet; Commonly known as: Plavix; Take 1 tablet (75   mg) by mouth once daily.   doxycycline 100 mg tablet; Commonly known as: Vibra-Tabs; Take 1 tablet   (100 mg) by mouth every 12 hours for 28 days. Take with a full glass of   water and do not lie down for at least 30 minutes after.   metFORMIN 500 mg tablet; Commonly known as: Glucophage; Take 1 tablet   (500 mg) by mouth 2 times daily (morning and late afternoon).   nicotine 14 mg/24 hr patch; Commonly known as: Nicoderm CQ; Place 1   patch over 24 hours on the skin once daily.   * Xarelto 15 mg tablet; Generic drug: rivaroxaban; Take 1 tablet (15 mg)   by mouth 2 times daily (morning and late afternoon) for 21 days. Take with   food. After 21 days switch to 20mg tablets once a day.   * Xarelto 20 mg tablet; Generic drug: rivaroxaban; Take 1 tablet (20 mg)   by mouth once daily in the evening. Take with meals. Take with food. DO   NOT START until you finish 15mg tablets.  * This list has 2  medication(s) that are the same as other medications   prescribed for you. Read the directions carefully, and ask your doctor or   other care provider to review them with you.     CHANGE how you take these medications     amoxicillin-pot clavulanate 875-125 mg tablet; Commonly known as:   Augmentin; Take 1 tablet by mouth every 12 hours for 28 days.; What   changed: when to take this     CONTINUE taking these medications     albuterol 90 mcg/actuation inhaler; Commonly known as: Proventil HFA;   Inhale 2 puffs every 4 hours if needed for wheezing or shortness of   breath.   Blood glucose monitoring meter; Test twice daily   cyclobenzaprine 10 mg tablet; Commonly known as: Flexeril; Take 1 tablet   (10 mg) by mouth as needed at bedtime for muscle spasms.   levothyroxine 75 mcg tablet; Commonly known as: Synthroid, Levoxyl; Take   1 tablet (75 mcg) by mouth once daily.   losartan 100 mg tablet; Commonly known as: Cozaar; Take 1 tablet (100   mg) by mouth once daily.   metoprolol succinate  mg 24 hr tablet; Commonly known as:   Toprol-XL; Take 1 tablet (100 mg) by mouth once daily.   omeprazole 40 mg DR capsule; Commonly known as: PriLOSEC; Take 1 capsule   (40 mg) by mouth once daily.   rosuvastatin 20 mg tablet; Commonly known as: Crestor; Take 1 tablet (20   mg) by mouth once daily.     STOP taking these medications     bacitracin 500 unit/gram ointment   benzonatate 100 mg capsule; Commonly known as: Tessalon   loratadine 10 mg tablet; Commonly known as: Claritin   nabumetone 750 mg tablet; Commonly known as: Relafen       Outpatient Follow-Up  Future Appointments   Date Time Provider Department Center   8/13/2025  4:00 PM Rajat Ellis MD [address]       Love Esqueda MD

## 2025-04-09 NOTE — PROGRESS NOTES
Vancomycin Dosing by Pharmacy- FOLLOW UP    Connor Farr is a 59 y.o. year old male who Pharmacy has been consulted for vancomycin dosing for osteomyelitis/septic arthritis. Based on the patient's indication and renal status this patient is being dosed based on a goal AUC of 400-600.     Renal function is currently stable.    Current vancomycin dose: 1500 mg given every 12 hours    Estimated vancomycin AUC on current dose: 470 mg/L.hr     Visit Vitals  /67 (BP Location: Right arm, Patient Position: Lying)   Pulse 71   Temp 36.2 °C (97.2 °F) (Temporal)   Resp 18        Lab Results   Component Value Date    CREATININE 0.60 2025    CREATININE 0.63 2025    CREATININE 0.60 2025    CREATININE 0.59 2025        Patient weight is as follows:   Vitals:    25 0437   Weight: 107 kg (236 lb 1.8 oz)       Cultures:  No results found for the encounter in last 14 days.       I/O last 3 completed shifts:  In: 349.2 (3.3 mL/kg) [P.O.:240; I.V.:59.2 (0.6 mL/kg); IV Piggyback:50]  Out: - (0 mL/kg)   Weight: 107.1 kg   I/O during current shift:  No intake/output data recorded.    Temp (24hrs), Av.5 °C (97.7 °F), Min:36.2 °C (97.2 °F), Max:36.9 °C (98.4 °F)      Assessment/Plan    Within goal AUC range. Continue current vancomycin regimen.    This dosing regimen is predicted by [employer] to result in the following pharmacokinetic parameters:  Loading dose: N/A  Regimen: 1500 mg IV every 12 hours.  Start time: 09:52 on 2025  Exposure target: AUC24 (range)400-600 mg/L.hr   [id number]: 467 mg/L.hr  AUC24,ss: 470 mg/L.hr  Probability of AUC24 > 400: 93 %  Ctrough,ss: 12.9 mg/L  Probability of Ctrough,ss > 20: 0 %      The next level will be obtained on  at 5:00. May be obtained sooner if clinically indicated.   Will continue to monitor renal function daily while on vancomycin and order serum creatinine at least every 48 hours if not already ordered.  Follow for continued vancomycin needs,  clinical response, and signs/symptoms of toxicity.       Bereket aWtson, PharmD

## 2025-04-10 ENCOUNTER — APPOINTMENT (OUTPATIENT)
Dept: VASCULAR SURGERY | Facility: CLINIC | Age: 60
End: 2025-04-10
Payer: COMMERCIAL

## 2025-04-14 ENCOUNTER — HOSPITAL ENCOUNTER (EMERGENCY)
Facility: HOSPITAL | Age: 60
Discharge: HOME | End: 2025-04-14
Attending: STUDENT IN AN ORGANIZED HEALTH CARE EDUCATION/TRAINING PROGRAM
Payer: COMMERCIAL

## 2025-04-14 ENCOUNTER — APPOINTMENT (OUTPATIENT)
Dept: RADIOLOGY | Facility: HOSPITAL | Age: 60
End: 2025-04-14
Payer: COMMERCIAL

## 2025-04-14 VITALS
TEMPERATURE: 97 F | SYSTOLIC BLOOD PRESSURE: 146 MMHG | DIASTOLIC BLOOD PRESSURE: 95 MMHG | WEIGHT: 246 LBS | HEIGHT: 72 IN | HEART RATE: 84 BPM | BODY MASS INDEX: 33.32 KG/M2 | OXYGEN SATURATION: 99 % | RESPIRATION RATE: 20 BRPM

## 2025-04-14 DIAGNOSIS — M86.9 OSTEOMYELITIS OF LEFT FOOT, UNSPECIFIED TYPE (MULTI): ICD-10-CM

## 2025-04-14 DIAGNOSIS — K57.92 DIVERTICULITIS: Primary | ICD-10-CM

## 2025-04-14 LAB
ALBUMIN SERPL BCP-MCNC: 4 G/DL (ref 3.4–5)
ALP SERPL-CCNC: 67 U/L (ref 33–120)
ALT SERPL W P-5'-P-CCNC: 32 U/L (ref 10–52)
ANION GAP SERPL CALCULATED.3IONS-SCNC: 13 MMOL/L (ref 10–20)
APPEARANCE UR: CLEAR
AST SERPL W P-5'-P-CCNC: 26 U/L (ref 9–39)
BASOPHILS # BLD AUTO: 0.09 X10*3/UL (ref 0–0.1)
BASOPHILS NFR BLD AUTO: 0.9 %
BILIRUB SERPL-MCNC: 0.4 MG/DL (ref 0–1.2)
BILIRUB UR STRIP.AUTO-MCNC: NEGATIVE MG/DL
BUN SERPL-MCNC: 6 MG/DL (ref 6–23)
CALCIUM SERPL-MCNC: 9.5 MG/DL (ref 8.6–10.3)
CHLORIDE SERPL-SCNC: 100 MMOL/L (ref 98–107)
CO2 SERPL-SCNC: 26 MMOL/L (ref 21–32)
COLOR UR: YELLOW
CREAT SERPL-MCNC: 0.66 MG/DL (ref 0.5–1.3)
CRP SERPL-MCNC: 7.2 MG/DL
EGFRCR SERPLBLD CKD-EPI 2021: >90 ML/MIN/1.73M*2
EOSINOPHIL # BLD AUTO: 0.21 X10*3/UL (ref 0–0.7)
EOSINOPHIL NFR BLD AUTO: 2 %
ERYTHROCYTE [DISTWIDTH] IN BLOOD BY AUTOMATED COUNT: 13 % (ref 11.5–14.5)
ERYTHROCYTE [SEDIMENTATION RATE] IN BLOOD BY WESTERGREN METHOD: 80 MM/H (ref 0–20)
GIANT PLATELETS BLD QL SMEAR: NORMAL
GLUCOSE SERPL-MCNC: 110 MG/DL (ref 74–99)
GLUCOSE UR STRIP.AUTO-MCNC: NORMAL MG/DL
HCT VFR BLD AUTO: 39.4 % (ref 41–52)
HGB BLD-MCNC: 13.1 G/DL (ref 13.5–17.5)
IMM GRANULOCYTES # BLD AUTO: 0.08 X10*3/UL (ref 0–0.7)
IMM GRANULOCYTES NFR BLD AUTO: 0.8 % (ref 0–0.9)
KETONES UR STRIP.AUTO-MCNC: NEGATIVE MG/DL
LEUKOCYTE ESTERASE UR QL STRIP.AUTO: NEGATIVE
LIPASE SERPL-CCNC: 14 U/L (ref 9–82)
LYMPHOCYTES # BLD AUTO: 2.33 X10*3/UL (ref 1.2–4.8)
LYMPHOCYTES NFR BLD AUTO: 22.6 %
MCH RBC QN AUTO: 31.5 PG (ref 26–34)
MCHC RBC AUTO-ENTMCNC: 33.2 G/DL (ref 32–36)
MCV RBC AUTO: 95 FL (ref 80–100)
MONOCYTES # BLD AUTO: 0.64 X10*3/UL (ref 0.1–1)
MONOCYTES NFR BLD AUTO: 6.2 %
NEUTROPHILS # BLD AUTO: 6.94 X10*3/UL (ref 1.2–7.7)
NEUTROPHILS NFR BLD AUTO: 67.5 %
NITRITE UR QL STRIP.AUTO: NEGATIVE
NRBC BLD-RTO: 0 /100 WBCS (ref 0–0)
PH UR STRIP.AUTO: 6 [PH]
PLATELET # BLD AUTO: 425 X10*3/UL (ref 150–450)
POLYCHROMASIA BLD QL SMEAR: NORMAL
POTASSIUM SERPL-SCNC: 3.8 MMOL/L (ref 3.5–5.3)
PROT SERPL-MCNC: 8 G/DL (ref 6.4–8.2)
PROT UR STRIP.AUTO-MCNC: NEGATIVE MG/DL
RBC # BLD AUTO: 4.16 X10*6/UL (ref 4.5–5.9)
RBC # UR STRIP.AUTO: NEGATIVE MG/DL
RBC MORPH BLD: NORMAL
SODIUM SERPL-SCNC: 135 MMOL/L (ref 136–145)
SP GR UR STRIP.AUTO: 1.02
UROBILINOGEN UR STRIP.AUTO-MCNC: ABNORMAL MG/DL
WBC # BLD AUTO: 10.3 X10*3/UL (ref 4.4–11.3)

## 2025-04-14 PROCEDURE — 85025 COMPLETE CBC W/AUTO DIFF WBC: CPT

## 2025-04-14 PROCEDURE — 81003 URINALYSIS AUTO W/O SCOPE: CPT

## 2025-04-14 PROCEDURE — 85652 RBC SED RATE AUTOMATED: CPT

## 2025-04-14 PROCEDURE — 2550000001 HC RX 255 CONTRASTS

## 2025-04-14 PROCEDURE — 36415 COLL VENOUS BLD VENIPUNCTURE: CPT

## 2025-04-14 PROCEDURE — 74177 CT ABD & PELVIS W/CONTRAST: CPT | Performed by: RADIOLOGY

## 2025-04-14 PROCEDURE — 99285 EMERGENCY DEPT VISIT HI MDM: CPT | Mod: 25 | Performed by: STUDENT IN AN ORGANIZED HEALTH CARE EDUCATION/TRAINING PROGRAM

## 2025-04-14 PROCEDURE — 83690 ASSAY OF LIPASE: CPT

## 2025-04-14 PROCEDURE — 74177 CT ABD & PELVIS W/CONTRAST: CPT

## 2025-04-14 PROCEDURE — 80053 COMPREHEN METABOLIC PANEL: CPT

## 2025-04-14 PROCEDURE — 73630 X-RAY EXAM OF FOOT: CPT | Mod: LT

## 2025-04-14 PROCEDURE — 86140 C-REACTIVE PROTEIN: CPT

## 2025-04-14 RX ADMIN — IOHEXOL 75 ML: 350 INJECTION, SOLUTION INTRAVENOUS at 14:31

## 2025-04-14 ASSESSMENT — PAIN SCALES - GENERAL: PAINLEVEL_OUTOF10: 6

## 2025-04-14 ASSESSMENT — PAIN DESCRIPTION - LOCATION: LOCATION: ABDOMEN

## 2025-04-14 ASSESSMENT — PAIN DESCRIPTION - PAIN TYPE: TYPE: ACUTE PAIN

## 2025-04-14 ASSESSMENT — PAIN - FUNCTIONAL ASSESSMENT: PAIN_FUNCTIONAL_ASSESSMENT: 0-10

## 2025-04-14 NOTE — DISCHARGE INSTRUCTIONS
Continue your antibiotics as prescribed and follow-up with Dr. Wen your podiatrist in 2 days for further evaluation.  Please do pursue a clear soft diet as you were diagnosed with diverticulitis which is an infection in your sigmoid colon.  Refer to the diverticulitis instructions attached for further care information.    Please present back to ER if you develop any fevers, chills, intractable nausea or vomiting, severe abdominal pain.    It is important to remember that your care does not end here and you must continue to monitor your condition closely. Please return to the emergency department for any worsening or concerning signs or symptoms as directed by our conversations and the discharge instructions. If you do not have a doctor please contact the referral number on your discharge instructions. Please contact any physician specialists provided in your discharge notes as it is very important to follow up with them regarding your condition. If you are unable to reach the physicians provided, please come back to the Emergency Department at any time.

## 2025-04-14 NOTE — ED TRIAGE NOTES
Pt states he has gangrene in his toe on his left foot and feels like its getting worse. Abdominal pain.

## 2025-04-14 NOTE — Clinical Note
Connor Farr was seen and treated in our emergency department on 4/14/2025.  He may return to work on 04/15/2025.       If you have any questions or concerns, please don't hesitate to call.      Destiny Beverly PA-C

## 2025-04-14 NOTE — ED PROVIDER NOTES
HPI   Chief Complaint   Patient presents with    Abdominal Pain    Wound Infection       HPI  59-year-old male with history of osteomyelitis of left foot, hypertension, high cholesterol, PAD presents for evaluation of suprapubic and left lower quadrant abdominal pain and concerns for worsening left foot infection.  Patient states that he has pain in the middle and left side of his lower abdomen that has worsened since he got out of the hospital 3 to 4 days ago.  States the pain is worse when he tries to urinate or have a bowel movement.  Pain is sharp and crampy in nature.  Denies vomiting or change in bowel habitus.  No recent abdominal surgeries.  Patient also was recently admitted to the hospital for osteomyelitis of his left foot and peripheral arterial disease and had surgery done by vascular surgery Dr. Oro.  He does have a necrotic wound to his big toe for which they suggested amputation but they are currently doing watchful waiting the patient does have follow-up with his podiatrist Dr. Wen in 2 days.  States he feels the toe and forefoot is a little bit more swollen than normal.  He is on multiple antibiotics for therapy at this time.  No recent fevers or chills.  Otherwise has no acute complaints.      Patient History   Past Medical History:   Diagnosis Date    Acute sinusitis, unspecified 08/29/2013    Acute sinusitis    Body mass index (BMI) 27.0-27.9, adult     BMI 27.0-27.9,adult    Fatty liver     High cholesterol     Hypertension     Personal history of other diseases of the respiratory system 11/26/2013    History of acute bronchitis    Personal history of other specified conditions 08/01/2013    History of abdominal pain     Past Surgical History:   Procedure Laterality Date    INVASIVE VASCULAR PROCEDURE N/A 4/7/2025    Procedure: Aortogram With Runoffs;  Surgeon: Krys Oro MD;  Location: [facility] Cardiac Cath Lab;  Service: Vascular Surgery;  Laterality: N/A;  Bilateral iliac intervention     INVASIVE VASCULAR PROCEDURE N/A 4/7/2025    Procedure: Lower Extremity Intervention;  Surgeon: Krys Oro MD;  Location: [facility] Cardiac Cath Lab;  Service: Vascular Surgery;  Laterality: N/A;     Family History   Problem Relation Name Age of Onset    Other (MTHFR mutation) Sister      No Known Problems Daughter      No Known Problems Daughter      No Known Problems Son      Hypertension Other FHx         essential    Heart disease Other FHx     Diabetes type II Other FHx      Social History     Tobacco Use    Smoking status: Every Day     Current packs/day: 0.50     Types: Cigarettes    Smokeless tobacco: Never   Vaping Use    Vaping status: Never Used   Substance Use Topics    Alcohol use: Never     Comment: Social alcohol use    Drug use: Never       Physical Exam   ED Triage Vitals [04/14/25 1318]   Temperature Heart Rate Respirations BP   36.1 °C (97 °F) 84 20 (!) 146/95      Pulse Ox Temp Source Heart Rate Source Patient Position   99 % Temporal -- Sitting      BP Location FiO2 (%)     -- --       Physical Exam  Vitals and nursing note reviewed.   Constitutional:       General: He is not in acute distress.     Appearance: He is well-developed.   HENT:      Head: Normocephalic and atraumatic.   Eyes:      Conjunctiva/sclera: Conjunctivae normal.   Cardiovascular:      Rate and Rhythm: Normal rate and regular rhythm.      Heart sounds: No murmur heard.  Pulmonary:      Effort: Pulmonary effort is normal. No respiratory distress.      Breath sounds: Normal breath sounds.   Abdominal:      Palpations: Abdomen is soft.      Hernia: No hernia is present.      Comments: Suprapubic and left lower quadrant abdominal pain no rebound or guarding   Musculoskeletal:         General: No swelling.      Cervical back: Neck supple.   Skin:     General: Skin is warm and dry.      Capillary Refill: Capillary refill takes less than 2 seconds.      Comments: Dorsalis pedis pulse of the left lower extremity is palpable on exam,  evidence of necrosis of the left great toe extending down to the MTP   Neurological:      Mental Status: He is alert.   Psychiatric:         Mood and Affect: Mood normal.           ED Course & MDM   ED Course as of 04/14/25 1653 Mon Apr 14, 2025 1635 Did discuss care with patient as well as patient's podiatrist Dr. Wne.  Patient already on antimicrobial therapy outpatient.  He does have follow-up scheduled with Dr. Wen in 2 days for reevaluation.  No indication for repeat admission at this time, patient with close follow-up will continue with clear diet for treatment of acute diverticulitis return precautions discussed for any worsening of abdominal pain. [JJ]      ED Course User Index  [JJ] Destiny Beverly PA-C         Diagnoses as of 04/14/25 1653   Diverticulitis   Osteomyelitis of left foot, unspecified type (Multi)                 No data recorded     Damir Coma Scale Score: 15 (04/14/25 1359 : Sara Steiner RN)                           Medical Decision Making  Parts of this chart have been completed using voice recognition software. Please excuse any errors of transcription.  My thought process and reason for plan has been formulated from the time that I saw the patient until the time of disposition and is not specific to one specific moment during their visit and furthermore my MDM encompasses this entire chart and not only this text box.      HPI: Detailed above.    Exam: A medically appropriate exam performed, outlined above, given the known history and presentation.    History obtained from: Patient    EKG: Not warranted    Social Determinants of Health considered during this visit: Lives independently, recently admitted    Medications given during visit:  Medications   iohexol (OMNIPaque) 350 mg iodine/mL solution 75 mL (75 mL intravenous Given 4/14/25 1431)        Diagnostic/tests  Labs Reviewed   CBC WITH AUTO DIFFERENTIAL - Abnormal       Result Value    WBC 10.3      nRBC 0.0      RBC 4.16  (*)     Hemoglobin 13.1 (*)     Hematocrit 39.4 (*)     MCV 95      MCH 31.5      MCHC 33.2      RDW 13.0      Platelets 425      Neutrophils % 67.5      Immature Granulocytes %, Automated 0.8      Lymphocytes % 22.6      Monocytes % 6.2      Eosinophils % 2.0      Basophils % 0.9      Neutrophils Absolute 6.94      Immature Granulocytes Absolute, Automated 0.08      Lymphocytes Absolute 2.33      Monocytes Absolute 0.64      Eosinophils Absolute 0.21      Basophils Absolute 0.09     COMPREHENSIVE METABOLIC PANEL - Abnormal    Glucose 110 (*)     Sodium 135 (*)     Potassium 3.8      Chloride 100      Bicarbonate 26      Anion Gap 13      Urea Nitrogen 6      Creatinine 0.66      eGFR >90      Calcium 9.5      Albumin 4.0      Alkaline Phosphatase 67      Total Protein 8.0      AST 26      Bilirubin, Total 0.4      ALT 32     SEDIMENTATION RATE, AUTOMATED - Abnormal    Sedimentation Rate 80 (*)    C-REACTIVE PROTEIN - Abnormal    C-Reactive Protein 7.20 (*)    URINALYSIS WITH REFLEX CULTURE AND MICROSCOPIC - Abnormal    Color, Urine Yellow      Appearance, Urine Clear      Specific Gravity, Urine 1.018      pH, Urine 6.0      Protein, Urine NEGATIVE      Glucose, Urine Normal      Blood, Urine NEGATIVE      Ketones, Urine NEGATIVE      Bilirubin, Urine NEGATIVE      Urobilinogen, Urine 2 (1+) (*)     Nitrite, Urine NEGATIVE      Leukocyte Esterase, Urine NEGATIVE     LIPASE - Normal    Lipase 14      Narrative:     Venipuncture immediately after or during the administration of Metamizole may lead to falsely low results. Testing should be performed immediately prior to Metamizole dosing.   URINALYSIS WITH REFLEX CULTURE AND MICROSCOPIC    Narrative:     The following orders were created for panel order Urinalysis with Reflex Culture and Microscopic.  Procedure                               Abnormality         Status                     ---------                               -----------         ------                      Urinalysis with Reflex C...[[id number]]  Abnormal            Final result               Extra Urine Gray Tube[[id number]]                            In process                   Please view results for these tests on the individual orders.   EXTRA URINE GRAY TUBE   MORPHOLOGY    RBC Morphology See Below      Polychromasia Mild      Giant Platelets Few        CT abdomen pelvis w IV contrast   Final Result   Sigmoid colonic diverticulitis with slightly increasing stranding in   the perisigmoid region extending to the bladder dome. Mild bladder   dome thickening likely reactive.        Colonic diverticulosis.        Signed by: Mainor Alex 4/14/2025 2:54 PM   Dictation workstation:   [id number]      XR foot left 3+ views   Final Result   A redemonstration of small erosion of the 1st tuft with   indistinctness of cortical margins. This may be related to   osteomyelitis. Further evaluation with an MRI may be obtained if   clinically warranted.        Hallucis valgus deformity and degenerative changes.             MACRO:   None        Signed by: Keely Byers 4/14/2025 2:20 PM   Dictation workstation:   [id number]           Considerations/further MDM:  Patient is a 59-year-old male presenting for evaluation of abdominal pain, left great toe wound    I saw this patient in conjunction with Dr. Purcell    Patient awake alert nontoxic-appearing vital signs with hypertension otherwise within normal limits.  He does have evidence of tenderness to palpation suprapubically and in his left lower quadrant on exam.  No rebound or guarding to suggest peritonitis.  Does have evidence of dry necrotic wound of his left great toe with minimal surrounding erythema no significant edema of the left foot.  He does have palpable dorsalis pedis pulse of his left lower extremity on exam.  The foot is warm he does have adequate sensation and range of motion of his left foot.  There is no purulence to the wound.  No significant  leukocytosis his CRP and sed rate are elevated x-ray is suggestive of redemonstration of small erosion concerning for osteomyelitis no other acute changes identified.  Care discussed with the patient's podiatrist.  Will continue his antimicrobial therapy with close follow-up in 2 days with podiatry for further evaluation.  His CT abdomen pelvis is concerning for acute diverticulitis no evidence of bowel perforation or abscess.  Patient's pain is controlled during the ER visit he is not requesting medication for pain relief at this time.  He has no evidence of sepsis or toxicity on exam.  He is felt to be appropriate for outpatient therapy and close follow-up with PCP for further management.  Advised clear diet advance as tolerated.  Return precautions discussed for any worsening of pain vomiting or profuse diarrhea.  Strict return precautions discussed patient released in good condition.  I discussed the laboratory and imaging findings with the patient at bedside. Patient's questions and concerns were addressed. Patient was released in good condition, discharged with instructions to follow up with primary care provider and appropriate specialist, and to return to ED at any time for worsening symptoms or any other concerns. Patient demonstrates understanding of the findings and the importance of appropriate follow up care.         Procedure  Procedures     Destiny Beverly PA-C  04/14/25 6129

## 2025-04-14 NOTE — ED PROVIDER NOTES
Supervisory note:  Patient seen in conjunction with NIDA Mcdowell.  Patient presents with abdominal pain.  The pain is located in the suprapubic area.  Patient reports that there is pain when he needs to go to the bathroom as well.  He has not been having any vomiting or fevers.  He is concerned about the infection on his left foot/toe as well.  Examination reveals patient in no acute distress.  Vital signs are unremarkable.    Laboratory studies reveal ESR and CRP which were somewhat elevated but white blood cell count which is unremarkable.  CAT scan reveals diverticulitis.  Patient is already on antibiotics for his foot.  Patient's case was discussed with his podiatrist, Dr. Wen, who feels that he can keep his already scheduled appointment on Wednesday for his toe/foot.  Patient is already on antibiotics.  Patient was given strict return precautions for any worsening symptoms including worsening abdominal pain, worsening pain in the foot, purulent drainage, fevers, or any other worsening symptoms.  My suspicion for sepsis is very low at this time.  I personally saw the patient and made/approved the management plan and take responsiblity for the patient management.  Parts of this chart were completed with dictation software, please excuse any errors in transcription.     Claudy Purcell MD  04/14/25 1694

## 2025-04-15 ENCOUNTER — APPOINTMENT (OUTPATIENT)
Dept: PRIMARY CARE | Facility: CLINIC | Age: 60
End: 2025-04-15
Payer: COMMERCIAL

## 2025-04-15 LAB — HOLD SPECIMEN: NORMAL

## 2025-04-18 ENCOUNTER — OFFICE VISIT (OUTPATIENT)
Dept: WOUND CARE | Facility: HOSPITAL | Age: 60
End: 2025-04-18
Payer: COMMERCIAL

## 2025-04-18 DIAGNOSIS — L97.522 DIABETIC ULCER OF TOE OF LEFT FOOT ASSOCIATED WITH TYPE 2 DIABETES MELLITUS, WITH FAT LAYER EXPOSED: ICD-10-CM

## 2025-04-18 DIAGNOSIS — E11.621 DIABETIC ULCER OF TOE OF LEFT FOOT ASSOCIATED WITH TYPE 2 DIABETES MELLITUS, WITH FAT LAYER EXPOSED: ICD-10-CM

## 2025-04-18 PROCEDURE — 99213 OFFICE O/P EST LOW 20 MIN: CPT

## 2025-04-21 ENCOUNTER — OFFICE VISIT (OUTPATIENT)
Dept: VASCULAR SURGERY | Facility: CLINIC | Age: 60
End: 2025-04-21
Payer: COMMERCIAL

## 2025-04-21 VITALS
HEART RATE: 80 BPM | HEIGHT: 72 IN | DIASTOLIC BLOOD PRESSURE: 88 MMHG | RESPIRATION RATE: 16 BRPM | SYSTOLIC BLOOD PRESSURE: 130 MMHG | TEMPERATURE: 98.6 F | WEIGHT: 227 LBS | BODY MASS INDEX: 30.75 KG/M2

## 2025-04-21 DIAGNOSIS — G62.9 NEUROPATHY: Primary | ICD-10-CM

## 2025-04-21 DIAGNOSIS — I73.9 PAD (PERIPHERAL ARTERY DISEASE) (CMS-HCC): ICD-10-CM

## 2025-04-21 PROCEDURE — 99213 OFFICE O/P EST LOW 20 MIN: CPT | Performed by: NURSE PRACTITIONER

## 2025-04-21 PROCEDURE — 3075F SYST BP GE 130 - 139MM HG: CPT | Performed by: NURSE PRACTITIONER

## 2025-04-21 PROCEDURE — 3079F DIAST BP 80-89 MM HG: CPT | Performed by: NURSE PRACTITIONER

## 2025-04-21 PROCEDURE — 4004F PT TOBACCO SCREEN RCVD TLK: CPT | Performed by: NURSE PRACTITIONER

## 2025-04-21 PROCEDURE — 3008F BODY MASS INDEX DOCD: CPT | Performed by: NURSE PRACTITIONER

## 2025-04-21 PROCEDURE — 4010F ACE/ARB THERAPY RXD/TAKEN: CPT | Performed by: NURSE PRACTITIONER

## 2025-04-21 ASSESSMENT — LIFESTYLE VARIABLES
SKIP TO QUESTIONS 9-10: 1
HOW OFTEN DO YOU HAVE SIX OR MORE DRINKS ON ONE OCCASION: NEVER
HOW OFTEN DO YOU HAVE A DRINK CONTAINING ALCOHOL: NEVER
HOW MANY STANDARD DRINKS CONTAINING ALCOHOL DO YOU HAVE ON A TYPICAL DAY: PATIENT DOES NOT DRINK
AUDIT-C TOTAL SCORE: 0

## 2025-04-21 ASSESSMENT — ENCOUNTER SYMPTOMS
LOSS OF SENSATION IN FEET: 0
OCCASIONAL FEELINGS OF UNSTEADINESS: 1
DEPRESSION: 0

## 2025-04-21 ASSESSMENT — PATIENT HEALTH QUESTIONNAIRE - PHQ9
1. LITTLE INTEREST OR PLEASURE IN DOING THINGS: NOT AT ALL
SUM OF ALL RESPONSES TO PHQ9 QUESTIONS 1 AND 2: 0
2. FEELING DOWN, DEPRESSED OR HOPELESS: NOT AT ALL

## 2025-04-21 ASSESSMENT — PAIN SCALES - GENERAL: PAINLEVEL_OUTOF10: 8

## 2025-04-21 NOTE — LETTER
April 21, 2025     Patient: Connor Farr   YOB: 1965   Date of Visit: 4/21/2025       To Whom It May Concern:    It is my medical opinion that Connor Farr may return to light duty immediately with the following restrictions: Majority of work needs to be done in a sitting position .    If you have any questions or concerns, please don't hesitate to call.         Sincerely,        SHAYLA Wise-CNP    CC: No Recipients

## 2025-04-21 NOTE — PATIENT INSTRUCTIONS
Connor,    It was nice to see you!  Thank you for choosing  vascular surgery for your care.    I am sorry that you are experiencing significant leg pain.  I think it would be best if you went back to Dr. Campbell for pain management.  I believe that most of your pain is neuropathic in nature.  You have easily palpable pulses in your feet which tells me that this is not a vascular type of pain.    Please keep your appointment for testing post angiogram.

## 2025-04-21 NOTE — PROGRESS NOTES
"History Of Present Illness  Connor Farr is a 59 y.o. male presenting with bilateral leg pain below the knee.  Patient is well-known to our service as he had an angiogram completed 4/7/2025.  Angiogram was completed secondary to gangrene of the left hallux.  Patient is due to see Dr. Wen tomorrow to determine what will be done surgically regarding his toe.  Patient's chief complaint today is pain below the knees bilaterally.  He states the pain is severe and describes it as \"pressure\".  He states that it seems to have started after procedure.  However, in chart review, the patient has seen Dr. Campbell (pain management) as well as Dr. Hernadez (neurology).  Neurology diagnosed him with a sensorimotor peripheral polyneuropathy that started around 2017.  He has tried gabapentin in the past which did not provide relief.  He has also tried Lyrica which provided him some relief.  Also recommended by pain management that he try a spinal cord stimulator, but ultimately did not have this completed.  Denies any claudication, ischemic rest pain.  Has known gangrene of the left great toe    Past Medical History  He has a past medical history of Acute sinusitis, unspecified (08/29/2013), Body mass index (BMI) 27.0-27.9, adult, Fatty liver, High cholesterol, Hypertension, Personal history of other diseases of the respiratory system (11/26/2013), and Personal history of other specified conditions (08/01/2013).    Surgical History  He has a past surgical history that includes Invasive Vascular Procedure (N/A, 4/7/2025) and Invasive Vascular Procedure (N/A, 4/7/2025).     Social History  He reports that he has been smoking cigarettes. He has never used smokeless tobacco. He reports that he does not drink alcohol and does not use drugs.    Family History  Family History[1]     Allergies  Cephalexin    Review of Systems    CONSTITUTIONAL: Denies weight loss, fever and chills.    HEENT: Denies changes in vision and " hearing.    RESPIRATORY: Denies SOB and cough.    CV: Denies palpitations and CP.    GI: Denies abdominal pain, nausea, vomiting and diarrhea.    : Denies dysuria and urinary frequency.    MSK: Denies myalgia and joint pain.    SKIN: Denies rash and pruritus.    VASC: Denies claudication, ischemic rest pain, or open wounds or sores.     NEUROLOGICAL: Denies headache and syncope.  Positive for bilateral lower extremity neuropathy    PSYCHIATRIC: Denies recent changes in mood. Denies anxiety and depression.     Physical Exam    General: Pt is alert and oriented x 3. Pleasant, conversive  HEENT: Head is atraumatic, normocephalic.   Cardiac: Normal S1-S2.  Regular rate and rhythm.  No murmurs.  Respiratory: Lungs clear to auscultation.  No adventitious sounds.  Abdomen: Soft, nondistended, nontender.  Bowel sounds x4 quadrants.  Pulse exam: Palpable brachial and radial pulses bilaterally. Easily palpable pedal pulses bilaterally.  Extremities: No significant edema noted.  Extremities are warm to the touch and normal in color.  No open wounds or sores.  Left hallux gangrene, dry.  Neuro: Moves all extremities spontaneously.  No focal deficits.  Psych: Appropriate affect.  Answers questions appropriately.     Last Recorded Vitals  /88 (BP Location: Right arm, Patient Position: Sitting, BP Cuff Size: Large adult)   Pulse 80   Temp 37 °C (98.6 °F)   Resp 16   Wt 103 kg (227 lb)     Relevant Results    Current Outpatient Medications   Medication Instructions    albuterol (Proventil HFA) 90 mcg/actuation inhaler 2 puffs, inhalation, Every 4 hours PRN    amoxicillin-pot clavulanate (Augmentin) 875-125 mg tablet 1 tablet, oral, Every 12 hours    aspirin 81 mg, oral, Daily    Blood glucose monitoring meter kit kit Test twice daily    clopidogrel (PLAVIX) 75 mg, oral, Daily    cyclobenzaprine (FLEXERIL) 10 mg, oral, Nightly PRN    doxycycline (VIBRA-TABS) 100 mg, oral, Every 12 hours, Take with a full glass of water  "and do not lie down for at least 30 minutes after.    levothyroxine (SYNTHROID, LEVOXYL) 75 mcg, oral, Daily    losartan (COZAAR) 100 mg, oral, Daily    metFORMIN (GLUCOPHAGE) 500 mg, oral, 2 times daily (morning and late afternoon)    metoprolol succinate XL (TOPROL-XL) 100 mg, oral, Daily    nicotine (Nicoderm CQ) 14 mg/24 hr patch 1 patch, transdermal, Daily    omeprazole (PRILOSEC) 40 mg, oral, Daily    rosuvastatin (CRESTOR) 20 mg, oral, Daily    Xarelto 15 mg, oral, 2 times daily (morning and late afternoon), Take with food. After 21 days switch to 20mg tablets once a day.    Xarelto 20 mg, oral, Daily with evening meal, Take with food. DO NOT START until you finish 15mg tablets.           Assessment/Plan   Peripheral arterial disease, status post bilateral endovascular intervention  Bilateral lower extremity neuropathy    Patient comes to the office today stating he is having intractable lower extremity pain below the knee.  He is describes the pain as a \"pressure\".  Initially, he stated that this began after surgery, however, after reviewing his chart I noted multiple visits with pain management as well as neurology and was previously diagnosed with neuropathy by neurology and CRPS by pain management.  Did have some relief with Lyrica, however, has since discontinued this medication.  Patient's pain seems chronic and neuropathic in nature.    I strongly advised the patient take restart his Lyrica as he still has some left.  Also, I encouraged him to follow-up with Dr. Campbell from pain management.  Do not believe this is vascular in nature.  Encourage patient to keep above plan for follow-up testing       SHAYLA Wise-POOJA          [1]   Family History  Problem Relation Name Age of Onset    Other (MTHFR mutation) Sister      No Known Problems Daughter      No Known Problems Daughter      No Known Problems Son      Hypertension Other FHx         essential    Heart disease Other FHx     Diabetes type " II Other FHx

## 2025-04-23 ENCOUNTER — TELEPHONE (OUTPATIENT)
Dept: VASCULAR SURGERY | Facility: HOSPITAL | Age: 60
End: 2025-04-23
Payer: COMMERCIAL

## 2025-04-23 ENCOUNTER — DOCUMENTATION (OUTPATIENT)
Dept: VASCULAR SURGERY | Facility: CLINIC | Age: 60
End: 2025-04-23

## 2025-04-23 ENCOUNTER — OFFICE VISIT (OUTPATIENT)
Dept: WOUND CARE | Facility: HOSPITAL | Age: 60
End: 2025-04-23
Payer: COMMERCIAL

## 2025-04-23 PROCEDURE — 11042 DBRDMT SUBQ TIS 1ST 20SQCM/<: CPT

## 2025-04-23 NOTE — TELEPHONE ENCOUNTER
Attempted to call patient wife 3 times between 4/22 and 4/23/2025.  I had seen the patient in the office on 4/21/2025 and received a message from the wife on the same day that she was very upset that we did not address the patient's weight loss.  Patient's chief complaint was bilateral lower extremity pain below the knee.  He did not mention weight loss during our office visit.  I did leave a voicemail for the wife to call me back so that we could discuss further.

## 2025-04-24 ENCOUNTER — APPOINTMENT (OUTPATIENT)
Dept: VASCULAR SURGERY | Facility: CLINIC | Age: 60
End: 2025-04-24
Payer: COMMERCIAL

## 2025-04-30 ENCOUNTER — OFFICE VISIT (OUTPATIENT)
Dept: WOUND CARE | Facility: HOSPITAL | Age: 60
End: 2025-04-30
Payer: COMMERCIAL

## 2025-04-30 PROCEDURE — 11042 DBRDMT SUBQ TIS 1ST 20SQCM/<: CPT

## 2025-05-07 ENCOUNTER — OFFICE VISIT (OUTPATIENT)
Dept: WOUND CARE | Facility: HOSPITAL | Age: 60
End: 2025-05-07
Payer: COMMERCIAL

## 2025-05-07 ENCOUNTER — APPOINTMENT (OUTPATIENT)
Dept: WOUND CARE | Facility: HOSPITAL | Age: 60
End: 2025-05-07
Payer: COMMERCIAL

## 2025-05-07 PROCEDURE — 11042 DBRDMT SUBQ TIS 1ST 20SQCM/<: CPT | Mod: TA

## 2025-05-14 ENCOUNTER — APPOINTMENT (OUTPATIENT)
Dept: WOUND CARE | Facility: HOSPITAL | Age: 60
End: 2025-05-14
Payer: COMMERCIAL

## 2025-05-15 ENCOUNTER — OFFICE VISIT (OUTPATIENT)
Dept: VASCULAR SURGERY | Facility: CLINIC | Age: 60
End: 2025-05-15
Payer: COMMERCIAL

## 2025-05-15 VITALS
HEIGHT: 72 IN | RESPIRATION RATE: 14 BRPM | WEIGHT: 231 LBS | BODY MASS INDEX: 31.29 KG/M2 | SYSTOLIC BLOOD PRESSURE: 144 MMHG | HEART RATE: 79 BPM | DIASTOLIC BLOOD PRESSURE: 96 MMHG

## 2025-05-15 DIAGNOSIS — I73.9 PERIPHERAL VASCULAR DISEASE: Primary | ICD-10-CM

## 2025-05-15 PROCEDURE — 4010F ACE/ARB THERAPY RXD/TAKEN: CPT | Performed by: NURSE PRACTITIONER

## 2025-05-15 PROCEDURE — 99213 OFFICE O/P EST LOW 20 MIN: CPT | Performed by: NURSE PRACTITIONER

## 2025-05-15 PROCEDURE — 3008F BODY MASS INDEX DOCD: CPT | Performed by: NURSE PRACTITIONER

## 2025-05-15 PROCEDURE — 4004F PT TOBACCO SCREEN RCVD TLK: CPT | Performed by: NURSE PRACTITIONER

## 2025-05-15 PROCEDURE — 3080F DIAST BP >= 90 MM HG: CPT | Performed by: NURSE PRACTITIONER

## 2025-05-15 PROCEDURE — 3077F SYST BP >= 140 MM HG: CPT | Performed by: NURSE PRACTITIONER

## 2025-05-15 ASSESSMENT — ENCOUNTER SYMPTOMS
LOSS OF SENSATION IN FEET: 0
OCCASIONAL FEELINGS OF UNSTEADINESS: 0
DEPRESSION: 0

## 2025-05-15 ASSESSMENT — LIFESTYLE VARIABLES
SKIP TO QUESTIONS 9-10: 1
HOW OFTEN DO YOU HAVE A DRINK CONTAINING ALCOHOL: MONTHLY OR LESS
HOW OFTEN DO YOU HAVE SIX OR MORE DRINKS ON ONE OCCASION: NEVER
HAVE YOU OR SOMEONE ELSE BEEN INJURED AS A RESULT OF YOUR DRINKING: NO
HAS A RELATIVE, FRIEND, DOCTOR, OR ANOTHER HEALTH PROFESSIONAL EXPRESSED CONCERN ABOUT YOUR DRINKING OR SUGGESTED YOU CUT DOWN: NO
AUDIT TOTAL SCORE: 1
AUDIT-C TOTAL SCORE: 1
HOW MANY STANDARD DRINKS CONTAINING ALCOHOL DO YOU HAVE ON A TYPICAL DAY: 1 OR 2

## 2025-05-15 ASSESSMENT — PAIN SCALES - GENERAL: PAINLEVEL_OUTOF10: 6

## 2025-05-15 ASSESSMENT — PATIENT HEALTH QUESTIONNAIRE - PHQ9
SUM OF ALL RESPONSES TO PHQ9 QUESTIONS 1 AND 2: 0
1. LITTLE INTEREST OR PLEASURE IN DOING THINGS: NOT AT ALL
2. FEELING DOWN, DEPRESSED OR HOPELESS: NOT AT ALL

## 2025-05-20 NOTE — PROGRESS NOTES
Chief Complaint  Discomfort/Pressure within Bilateral Lower Legs  Gangrene Left Hallux    History Of Present Illness  Connor Farr is a 59 y.o. male  who returns to the office today to discuss his bilateral lower extremities.  He continues to experience discomfort/pressure within his bilateral lower legs on a continuous basis.  The discomfort improves slightly when the patient is laying flat at night.  The patient complained of the same discomfort when he was last seen by our practice on 4/21/25.   He denies claudication and ischemic rest pain.      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  His preprocedure ABIs measured 0.84 on the right and 0.74 on the left.      Dr. Wen, his Podiatrist, is managing the gangrene.      The patient has seen Dr. Campbell (pain management) as well as Dr. Hernadez (neurology) in the past, for the discomfort/pressure within his bilateral lower legs.  Neurology diagnosed him with a sensorimotor peripheral polyneuropathy that started around 2017.  He has tried gabapentin in the past which did not provide relief.  He has also tried Lyrica which provided him some relief.  Also recommended by pain management that he try a spinal cord stimulator, but ultimately did not have this completed.           Past Medical History  He has a past medical history of Acute sinusitis, unspecified (08/29/2013), Body mass index (BMI) 27.0-27.9, adult, Fatty liver, High cholesterol, Hypertension, Personal history of other diseases of the respiratory system (11/26/2013), and Personal history of other specified conditions (08/01/2013).    Surgical History  He has a past surgical history that includes Invasive Vascular Procedure (N/A, 4/7/2025) and Invasive Vascular Procedure (N/A, 4/7/2025).     Social History  He reports that he has been smoking cigarettes. He started  smoking about 40 years ago. He has a 20.2 pack-year smoking history. He has never been exposed to tobacco smoke. He has never used smokeless tobacco. He reports that he does not drink alcohol and does not use drugs.    Family History  Family History[1]     Allergies  Cephalexin    Medications  Current Medications[2]     Review of Systems   CONSTITUTIONAL: Denies weight loss, fever and chills.  HEENT: Denies changes in vision and hearing.  RESPIRATORY: Denies SOB and cough.  CV: Denies palpitations and CP.  GI: Denies abdominal pain, nausea, vomiting and diarrhea.   : Denies dysuria and urinary frequency.   MSK: Positive for discomfort/pressure within bilateral lower legs.    SKIN: Denies rash and pruritus.   VASC: Denies claudication and ischemic rest pain.  NEUROLOGICAL: Denies headache and syncope.  PSYCHIATRIC: Denies recent changes in mood. Denies anxiety and depression.    Physical exam  Constitutional:  Alert and oriented to person, place, and time.  In no acute distress.    HEENT:  Head is atraumatic, normocephalic.    Neck:  Soft and nontender.  No cervical bruits present.    Respiratory:  Lungs clear to auscultation.    Cardiac:  Regular rate and rhythm.  No murmurs present.      Abdominal:  Soft, nontender, nondistended, with bowel sounds present.   Extremities:  Lower extremities warm to touch and normal in color.  No edema present within bilateral lower extremities.  Gangrene present to left hallux.       Pulse exam:  Radial pulses are palpable and equal.  Bilateral femoral pulses are palpable.  Bilateral DP pulses are faintly palpable.     Musculoskeletal:  Moves extremities freely.  Dermatological: Skin color, texture, turgor normal.  No rashes of lesions present.    Neurological:  No focal deficits.    Psych:  Calm, cooperative.  Answers questions appropriately.      Last Recorded Vitals  BP (!) 144/96 (BP Location: Right arm, Patient Position: Sitting, BP Cuff Size: Large adult)   Pulse 79   Resp  14   Wt 105 kg (231 lb)     Relevant Results  No results found for this or any previous visit (from the past 24 hours).    No results found.    Assessment/Plan:  Discomfort/Pressure within Bilateral Lower Legs secondary to Neuropathy  Peripheral Vascular Disease  Gangrene Left Hallux    The patient's primary complaint is discomfort/pressure within his bilateral lower legs.  This has been present for multiple years.  He has been seen by neurology and a pain management specialist for this issue in the past..  I encouraged the patient to return to his pain management specialist, concerning this discomfort.    The patient has gangrene to his left hallux, related to peripheral vascular disease.  He had an angiogram with intervention to his bilateral lower extremities on 4/7/2025, by Dr. Oro.    The gangrene is being managed by his podiatrist.    The patient will be scheduled to return to our office within the near future, for a PVR study, for routine follow-up following his angiogram.        Stephenie Fofana, APRN-CNP         [1]   Family History  Problem Relation Name Age of Onset    Other (MTHFR mutation) Sister      No Known Problems Daughter      No Known Problems Daughter      No Known Problems Son      Hypertension Other FHx         essential    Heart disease Other FHx     Diabetes type II Other FHx    [2]   Current Outpatient Medications:     Blood glucose monitoring meter kit kit, Test twice daily, Disp: 1 each, Rfl: 0    clopidogrel (Plavix) 75 mg tablet, Take 1 tablet (75 mg) by mouth once daily., Disp: 30 tablet, Rfl: 11    collagenase (SantyL) 250 unit/gram ointment, Apply nickel thick topically around the wound area daily., Disp: 30 g, Rfl: 1    levothyroxine (Synthroid, Levoxyl) 75 mcg tablet, Take 1 tablet (75 mcg) by mouth once daily., Disp: 90 tablet, Rfl: 0    losartan (Cozaar) 100 mg tablet, Take 1 tablet (100 mg) by mouth once daily., Disp: 30 tablet, Rfl: 5    metoprolol succinate XL (Toprol-XL)  100 mg 24 hr tablet, Take 1 tablet (100 mg) by mouth once daily., Disp: 90 tablet, Rfl: 3    omeprazole (PriLOSEC) 40 mg DR capsule, Take 1 capsule (40 mg) by mouth once daily., Disp: 90 capsule, Rfl: 0    rivaroxaban (Xarelto) 20 mg tablet, Take 1 tablet (20 mg) by mouth once daily in the evening. Take with meals. Take with food. DO NOT START until you finish 15mg tablets., Disp: 30 tablet, Rfl: 11    rosuvastatin (Crestor) 20 mg tablet, Take 1 tablet (20 mg) by mouth once daily., Disp: 90 tablet, Rfl: 3    albuterol (Proventil HFA) 90 mcg/actuation inhaler, Inhale 2 puffs every 4 hours if needed for wheezing or shortness of breath. (Patient not taking: Reported on 4/21/2025), Disp: 6.7 g, Rfl: 0    cyclobenzaprine (Flexeril) 10 mg tablet, Take 1 tablet (10 mg) by mouth as needed at bedtime for muscle spasms. (Patient not taking: Reported on 4/21/2025), Disp: 30 tablet, Rfl: 2    metFORMIN (Glucophage) 500 mg tablet, Take 1 tablet (500 mg) by mouth 2 times daily (morning and late afternoon). (Patient not taking: Reported on 4/21/2025), Disp: 200 tablet, Rfl: 3    nicotine (Nicoderm CQ) 14 mg/24 hr patch, Place 1 patch over 24 hours on the skin once daily. (Patient not taking: Reported on 4/21/2025), Disp: 28 patch, Rfl: 0    rivaroxaban (Xarelto) 15 mg tablet, Take 1 tablet (15 mg) by mouth 2 times daily (morning and late afternoon) for 21 days. Take with food. After 21 days switch to 20mg tablets once a day., Disp: 42 tablet, Rfl: 0

## 2025-05-21 ENCOUNTER — OFFICE VISIT (OUTPATIENT)
Dept: WOUND CARE | Facility: HOSPITAL | Age: 60
End: 2025-05-21
Payer: COMMERCIAL

## 2025-05-21 PROCEDURE — 11042 DBRDMT SUBQ TIS 1ST 20SQCM/<: CPT

## 2025-05-28 ENCOUNTER — OFFICE VISIT (OUTPATIENT)
Dept: WOUND CARE | Facility: HOSPITAL | Age: 60
End: 2025-05-28
Payer: COMMERCIAL

## 2025-05-28 DIAGNOSIS — L03.90 WOUND CELLULITIS: Primary | ICD-10-CM

## 2025-05-28 PROCEDURE — 99213 OFFICE O/P EST LOW 20 MIN: CPT

## 2025-05-29 ENCOUNTER — APPOINTMENT (OUTPATIENT)
Dept: PRIMARY CARE | Facility: CLINIC | Age: 60
End: 2025-05-29
Payer: COMMERCIAL

## 2025-05-30 ENCOUNTER — HOSPITAL ENCOUNTER (OUTPATIENT)
Dept: RADIOLOGY | Facility: HOSPITAL | Age: 60
Discharge: HOME | End: 2025-05-30
Payer: COMMERCIAL

## 2025-05-30 DIAGNOSIS — L03.90 WOUND CELLULITIS: ICD-10-CM

## 2025-05-30 PROCEDURE — 73630 X-RAY EXAM OF FOOT: CPT | Mod: LT

## 2025-06-02 ENCOUNTER — OFFICE VISIT (OUTPATIENT)
Dept: WOUND CARE | Facility: HOSPITAL | Age: 60
End: 2025-06-02
Payer: COMMERCIAL

## 2025-06-02 DIAGNOSIS — M86.672 CHRONIC OSTEOMYELITIS OF LEFT FOOT (MULTI): Primary | ICD-10-CM

## 2025-06-02 PROCEDURE — 99213 OFFICE O/P EST LOW 20 MIN: CPT

## 2025-06-09 ENCOUNTER — OFFICE VISIT (OUTPATIENT)
Dept: WOUND CARE | Facility: HOSPITAL | Age: 60
End: 2025-06-09
Payer: COMMERCIAL

## 2025-06-09 PROCEDURE — 99213 OFFICE O/P EST LOW 20 MIN: CPT

## 2025-06-12 ENCOUNTER — OFFICE VISIT (OUTPATIENT)
Dept: VASCULAR SURGERY | Facility: CLINIC | Age: 60
End: 2025-06-12
Payer: COMMERCIAL

## 2025-06-12 ENCOUNTER — ANCILLARY PROCEDURE (OUTPATIENT)
Dept: VASCULAR MEDICINE | Facility: CLINIC | Age: 60
End: 2025-06-12
Payer: COMMERCIAL

## 2025-06-12 VITALS
RESPIRATION RATE: 16 BRPM | HEIGHT: 72 IN | SYSTOLIC BLOOD PRESSURE: 126 MMHG | WEIGHT: 234 LBS | DIASTOLIC BLOOD PRESSURE: 89 MMHG | BODY MASS INDEX: 31.69 KG/M2 | HEART RATE: 74 BPM

## 2025-06-12 DIAGNOSIS — I73.9 PAD (PERIPHERAL ARTERY DISEASE): Primary | ICD-10-CM

## 2025-06-12 DIAGNOSIS — I73.9 PERIPHERAL VASCULAR DISEASE: ICD-10-CM

## 2025-06-12 PROCEDURE — 3079F DIAST BP 80-89 MM HG: CPT | Performed by: NURSE PRACTITIONER

## 2025-06-12 PROCEDURE — 99213 OFFICE O/P EST LOW 20 MIN: CPT | Performed by: NURSE PRACTITIONER

## 2025-06-12 PROCEDURE — 4010F ACE/ARB THERAPY RXD/TAKEN: CPT | Performed by: NURSE PRACTITIONER

## 2025-06-12 PROCEDURE — 3074F SYST BP LT 130 MM HG: CPT | Performed by: NURSE PRACTITIONER

## 2025-06-12 PROCEDURE — 93923 UPR/LXTR ART STDY 3+ LVLS: CPT

## 2025-06-12 PROCEDURE — 3008F BODY MASS INDEX DOCD: CPT | Performed by: NURSE PRACTITIONER

## 2025-06-12 PROCEDURE — 4004F PT TOBACCO SCREEN RCVD TLK: CPT | Performed by: NURSE PRACTITIONER

## 2025-06-12 PROCEDURE — 93923 UPR/LXTR ART STDY 3+ LVLS: CPT | Performed by: INTERNAL MEDICINE

## 2025-06-12 ASSESSMENT — LIFESTYLE VARIABLES
AUDIT TOTAL SCORE: 0
HOW MANY STANDARD DRINKS CONTAINING ALCOHOL DO YOU HAVE ON A TYPICAL DAY: PATIENT DOES NOT DRINK
SKIP TO QUESTIONS 9-10: 1
HOW OFTEN DO YOU HAVE A DRINK CONTAINING ALCOHOL: NEVER
HAS A RELATIVE, FRIEND, DOCTOR, OR ANOTHER HEALTH PROFESSIONAL EXPRESSED CONCERN ABOUT YOUR DRINKING OR SUGGESTED YOU CUT DOWN: NO
AUDIT-C TOTAL SCORE: 0
HOW OFTEN DO YOU HAVE SIX OR MORE DRINKS ON ONE OCCASION: NEVER
HAVE YOU OR SOMEONE ELSE BEEN INJURED AS A RESULT OF YOUR DRINKING: NO

## 2025-06-12 ASSESSMENT — PAIN SCALES - GENERAL: PAINLEVEL_OUTOF10: 0-NO PAIN

## 2025-06-13 NOTE — PROGRESS NOTES
Chief Complaint  History of Gangrene Left Hallux   Peripheral Vascular Disease    History Of Present Illness  Connor Farr returns to the the office today for a PVR/YONG study.    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  His preprocedure ABIs measured 0.84 on the right and 0.74 on the left.      The gangrene was being managed by the patient's podiatrist, Dr. Wen, at [facility].  He was discharged from the [facility] last week as his left great toe has healed.    The patient has no complaints of vascular claudication or ischemic rest pain.  He has continuous chronic discomfort/pressure within his bilateral lower legs.  This is related to peripheral polyneuropathy that started around 2017.  The patient also reports having discomfort to the plantar aspect of his bilateral feet.  He had x-rays completed recently and was informed that he has bone spurs bilaterally.     Past Medical History  He has a past medical history of Acute sinusitis, unspecified (08/29/2013), Body mass index (BMI) 27.0-27.9, adult, Fatty liver, High cholesterol, Hypertension, Personal history of other diseases of the respiratory system (11/26/2013), and Personal history of other specified conditions (08/01/2013).    Surgical History  He has a past surgical history that includes Invasive Vascular Procedure (N/A, 4/7/2025) and Invasive Vascular Procedure (N/A, 4/7/2025).     Social History  He reports that he has been smoking cigarettes. He started smoking about 40 years ago. He has a 20.2 pack-year smoking history. He has never been exposed to tobacco smoke. He has never used smokeless tobacco. He reports that he does not drink alcohol and does not use drugs.    Family History  Family History[1]     Allergies  Cephalexin    Medications  Current Medications[2]      Review of Systems   CONSTITUTIONAL: Denies weight loss, fever and chills.  HEENT: Denies changes in vision and hearing.  RESPIRATORY: Denies SOB and cough.  CV: Denies palpitations and CP.  GI: Denies abdominal pain, nausea, vomiting and diarrhea.   : Denies dysuria and urinary frequency.   MSK:  Positive for discomfort/pressure within bilateral lower legs.   SKIN: Denies rash and pruritus.   VASC: Denies claudication and ischemic rest pain.  NEUROLOGICAL: Denies headache and syncope.  PSYCHIATRIC: Denies recent changes in mood. Denies anxiety and depression.    Physical exam  Constitutional:  Alert and oriented to person, place, and time.  In no acute distress.    HEENT:  Head is atraumatic, normocephalic.    Neck:  Soft and nontender.  No cervical bruits present.    Respiratory:  Lungs clear to auscultation.    Cardiac:  Regular rate and rhythm.  No murmurs present.      Abdominal:  Soft, nontender, nondistended, with bowel sounds present.   Extremities:  Lower extremities warm to touch and normal in color.  No edema or open wounds present within bilateral legs or feet.  Gangrene to left hallux has resolved.  Pulse exam: Bilateral DP pulses faintly palpable.  Musculoskeletal:  Moves extremities freely.  Dermatological: Skin color, texture, turgor normal.  No rashes of lesions present.    Neurological:  No focal deficits.    Psych:  Calm, cooperative.  Answers questions appropriately.      Last Recorded Vitals  /89 (BP Location: Right arm, Patient Position: Sitting, BP Cuff Size: Large adult)   Pulse 74   Resp 16   Wt 106 kg (234 lb)     Relevant Results  No results found. However, due to the size of the patient record, not all encounters were searched. Please check Results Review for a complete set of results.    Vascular US PVR without exercise  Result Date: 6/13/2025           [facility] [id number] [place] [id number]            Phone [phone]  Vascular Lab Report   [facility] US PVR WITHOUT EXERCISE Patient Name:      LEIGHANN FOREMAN         Reading Physician:  [id number] Ninoska Higuera MD, RPVI Study Date:        6/12/2025             Ordering Provider:  [id number] DEEPA ROMAN MRN/PID:           [id number]              Fellow: Accession#:        [id number]          Technologist:       Leobardo Doyle RVT Date of Birth/Age: 1965 / 59 years Technologist 2: Gender:            M                     Encounter#:         [id number] Admission Status:  Outpatient            Location Performed: [facility]  Diagnosis/ICD: Peripheral vascular disease, unspecified-I73.9 CPT Codes:     01977 Peripheral artery PVR (multi segmental pressure  Pertinent History: History of right external iliac and left superficial femoral                    stenting and thrombectomy.  CONCLUSIONS:  Right Lower PVR: No evidence of arterial occlusive disease in the right lower extremity at rest. Normal digital perfusion noted. Multiphasic flow is noted in the right common femoral artery, right popliteal artery, right posterior tibial artery and right dorsalis pedis artery. Left Lower PVR: No evidence of arterial occlusive disease in the left lower extremity at rest. Decreased digital perfusion noted. Multiphasic flow is noted in the left common femoral artery, left popliteal artery, left posterior tibial artery and left dorsalis pedis artery.  Comparison: Compared with study from 3/28/2025, ABIs have improved bilaterally.  Imaging & Doppler Findings:  RIGHT Lower PVR                Pressures Ratios Right High Thigh               177 mmHg  1.42 Right Low Thigh                162 mmHg  1.30 Right Calf                     156 mmHg  1.25 Right Posterior Tibial (Ankle) 143 mmHg  1.14 Right Dorsalis Pedis (Ankle)   144 mmHg  1.15 Right  Digit (Great Toe)        95 mmHg   0.76   LEFT Lower PVR                Pressures Ratios Left High Thigh               172 mmHg  1.38 Left Low Thigh                157 mmHg  1.26 Left Calf                     143 mmHg  1.14 Left Posterior Tibial (Ankle) 137 mmHg  1.10 Left Dorsalis Pedis (Ankle)   144 mmHg  1.15 Left Digit (Great Toe)        74 mmHg   0.59                      Right     Left Brachial Pressure 124 mmHg 125 mmHg   [id number] Ninoska Higuera MD, RPALONDRA Electronically signed by [id number] Ninoska Higuera MD, JOY on 6/13/2025 at 6:42:21 AM  ** Final **     XR foot left 3+ views  Result Date: 5/31/2025  Interpreted By:  Echo Santo, STUDY: XR FOOT LEFT 3+ VIEWS;  5/30/2025 3:47 pm   INDICATION: Signs/Symptoms:left great toe wound.   COMPARISON: 04/14/2025   ACCESSION NUMBER(S): [id number]   ORDERING CLINICIAN: BJ GARCIA   FINDINGS: Hallux valgus with moderate osteoarthrosis of the 1st metatarsophalangeal articulation. Prominent plantar and posterior calcaneal spurs. Mild midfoot osteoarthrosis. No fracture or dislocation. No osseous lesion.. Soft tissue defects overlies the medial aspect of the 1st toe. There is subtle ill-defined appearance of the underlying cortex of the 1st distal phalangeal tuft.   IMPRESSION Findings equivocal for resorptive changes of the 1st distal phalangeal tuft with overlying skin wound. Osteomyelitis not excluded. MRI should be considered for additional characterization.     MACRO Critical Finding:  See findings. Notification was initiated on 5/31/2025 at 9:39 am by  Echo Santo.  (**-YCF-**)   Signed by: Echo Santo 5/31/2025 9:39 AM Dictation workstation:   [id number]      Assessment/Plan  Peripheral Vascular Disease    I discussed the PVR/YONG study with the patient.  Multiphasic flow was noted within his bilateral common femoral, popliteal, posterior tibial, and dorsalis pedis arteries.  His right YONG measured 1.15, TBI 0.76, his left YONG measured 1.15, TBI 0.59.    The patient  is doing well from a vascular standpoint.  He will continue to see his podiatrist as needed.      The patient will return to our office in 3 months for a repeat PVR/YONG study.    SHAYLA Owen-POOJA       [1]   Family History  Problem Relation Name Age of Onset    Other (MTHFR mutation) Sister      No Known Problems Daughter      No Known Problems Daughter      No Known Problems Son      Hypertension Other FHx         essential    Heart disease Other FHx     Diabetes type II Other FHx    [2]   Current Outpatient Medications:     clopidogrel (Plavix) 75 mg tablet, Take 1 tablet (75 mg) by mouth once daily., Disp: 30 tablet, Rfl: 11    collagenase (SantyL) 250 unit/gram ointment, Apply nickel thick topically around the wound area daily., Disp: 30 g, Rfl: 1    levothyroxine (Synthroid, Levoxyl) 75 mcg tablet, Take 1 tablet (75 mcg) by mouth once daily., Disp: 90 tablet, Rfl: 0    losartan (Cozaar) 100 mg tablet, Take 1 tablet (100 mg) by mouth once daily., Disp: 30 tablet, Rfl: 5    metoprolol succinate XL (Toprol-XL) 100 mg 24 hr tablet, Take 1 tablet (100 mg) by mouth once daily., Disp: 90 tablet, Rfl: 3    omeprazole (PriLOSEC) 40 mg DR capsule, Take 1 capsule (40 mg) by mouth once daily., Disp: 90 capsule, Rfl: 0    rivaroxaban (Xarelto) 20 mg tablet, Take 1 tablet (20 mg) by mouth once daily in the evening. Take with meals. Take with food. DO NOT START until you finish 15mg tablets., Disp: 30 tablet, Rfl: 11    rosuvastatin (Crestor) 20 mg tablet, Take 1 tablet (20 mg) by mouth once daily., Disp: 90 tablet, Rfl: 3    albuterol (Proventil HFA) 90 mcg/actuation inhaler, Inhale 2 puffs every 4 hours if needed for wheezing or shortness of breath. (Patient not taking: Reported on 4/21/2025), Disp: 6.7 g, Rfl: 0    cyclobenzaprine (Flexeril) 10 mg tablet, Take 1 tablet (10 mg) by mouth as needed at bedtime for muscle spasms. (Patient not taking: Reported on 4/21/2025), Disp: 30 tablet, Rfl: 2    metFORMIN  (Glucophage) 500 mg tablet, Take 1 tablet (500 mg) by mouth 2 times daily (morning and late afternoon). (Patient not taking: Reported on 4/21/2025), Disp: 200 tablet, Rfl: 3    nicotine (Nicoderm CQ) 14 mg/24 hr patch, Place 1 patch over 24 hours on the skin once daily. (Patient not taking: Reported on 4/21/2025), Disp: 28 patch, Rfl: 0    rivaroxaban (Xarelto) 15 mg tablet, Take 1 tablet (15 mg) by mouth 2 times daily (morning and late afternoon) for 21 days. Take with food. After 21 days switch to 20mg tablets once a day., Disp: 42 tablet, Rfl: 0

## 2025-06-17 ENCOUNTER — APPOINTMENT (OUTPATIENT)
Dept: RADIOLOGY | Facility: HOSPITAL | Age: 60
End: 2025-06-17
Payer: COMMERCIAL

## 2025-06-17 ENCOUNTER — HOSPITAL ENCOUNTER (OUTPATIENT)
Dept: RADIOLOGY | Facility: HOSPITAL | Age: 60
Discharge: HOME | End: 2025-06-17
Payer: COMMERCIAL

## 2025-06-17 DIAGNOSIS — M86.672 CHRONIC OSTEOMYELITIS OF LEFT FOOT (MULTI): ICD-10-CM

## 2025-06-17 PROCEDURE — A9503 TC99M MEDRONATE: HCPCS | Performed by: INTERNAL MEDICINE

## 2025-06-17 PROCEDURE — 78315 BONE IMAGING 3 PHASE: CPT | Performed by: INTERNAL MEDICINE

## 2025-06-17 PROCEDURE — 3430000001 HC RX 343 DIAGNOSTIC RADIOPHARMACEUTICALS: Performed by: INTERNAL MEDICINE

## 2025-06-17 PROCEDURE — 78315 BONE IMAGING 3 PHASE: CPT

## 2025-06-17 RX ADMIN — TECHNETIUM TC 99M MEDRONATE 24.5 MILLICURIE: 25 INJECTION, POWDER, FOR SOLUTION INTRAVENOUS at 09:10

## 2025-06-18 ENCOUNTER — APPOINTMENT (OUTPATIENT)
Dept: RADIOLOGY | Facility: HOSPITAL | Age: 60
End: 2025-06-18
Payer: COMMERCIAL

## 2025-06-19 ENCOUNTER — OFFICE VISIT (OUTPATIENT)
Dept: WOUND CARE | Facility: HOSPITAL | Age: 60
End: 2025-06-19
Payer: COMMERCIAL

## 2025-06-19 DIAGNOSIS — M86.672 CHRONIC OSTEOMYELITIS OF LEFT FOOT (MULTI): Primary | ICD-10-CM

## 2025-06-19 DIAGNOSIS — L03.90 WOUND CELLULITIS: ICD-10-CM

## 2025-06-19 PROCEDURE — 11042 DBRDMT SUBQ TIS 1ST 20SQCM/<: CPT

## 2025-06-20 DIAGNOSIS — I73.9 PERIPHERAL ARTERY DISEASE: ICD-10-CM

## 2025-06-20 DIAGNOSIS — K80.63 GALLBLADDER & BILE DUCT STONE, ACUTE CHOLECYSTITIS AND OBSTRUCTION: ICD-10-CM

## 2025-06-20 DIAGNOSIS — E03.9 HYPOTHYROIDISM, UNSPECIFIED TYPE: ICD-10-CM

## 2025-06-20 RX ORDER — OMEPRAZOLE 40 MG/1
40 CAPSULE, DELAYED RELEASE ORAL DAILY
Qty: 90 CAPSULE | Refills: 0 | Status: SHIPPED | OUTPATIENT
Start: 2025-06-20

## 2025-06-20 RX ORDER — ROSUVASTATIN CALCIUM 20 MG/1
20 TABLET, COATED ORAL DAILY
Qty: 90 TABLET | Refills: 0 | Status: SHIPPED | OUTPATIENT
Start: 2025-06-20 | End: 2026-06-20

## 2025-06-20 RX ORDER — LEVOTHYROXINE SODIUM 75 UG/1
75 TABLET ORAL DAILY
Qty: 90 TABLET | Refills: 0 | Status: SHIPPED | OUTPATIENT
Start: 2025-06-20

## 2025-06-22 LAB
BACTERIA SPEC AEROBE CULT: NORMAL
BACTERIA SPEC ANAEROBE CULT: NORMAL

## 2025-06-25 ENCOUNTER — OFFICE VISIT (OUTPATIENT)
Dept: WOUND CARE | Facility: HOSPITAL | Age: 60
End: 2025-06-25
Payer: COMMERCIAL

## 2025-06-25 DIAGNOSIS — M86.672 CHRONIC OSTEOMYELITIS OF LEFT FOOT (MULTI): ICD-10-CM

## 2025-06-25 DIAGNOSIS — E11.621 DIABETIC ULCER OF TOE OF LEFT FOOT ASSOCIATED WITH TYPE 2 DIABETES MELLITUS, WITH FAT LAYER EXPOSED: ICD-10-CM

## 2025-06-25 DIAGNOSIS — L97.522 DIABETIC ULCER OF TOE OF LEFT FOOT ASSOCIATED WITH TYPE 2 DIABETES MELLITUS, WITH FAT LAYER EXPOSED: ICD-10-CM

## 2025-06-25 DIAGNOSIS — L03.90 WOUND CELLULITIS: Primary | ICD-10-CM

## 2025-06-25 LAB
BACTERIA SPEC AEROBE CULT: ABNORMAL
BACTERIA SPEC ANAEROBE CULT: ABNORMAL

## 2025-06-25 PROCEDURE — 11042 DBRDMT SUBQ TIS 1ST 20SQCM/<: CPT

## 2025-06-25 RX ORDER — SULFAMETHOXAZOLE AND TRIMETHOPRIM 800; 160 MG/1; MG/1
1 TABLET ORAL 2 TIMES DAILY
Qty: 20 TABLET | Refills: 0 | Status: SHIPPED | OUTPATIENT
Start: 2025-06-25 | End: 2025-07-05

## 2025-06-26 ENCOUNTER — APPOINTMENT (OUTPATIENT)
Dept: WOUND CARE | Facility: HOSPITAL | Age: 60
End: 2025-06-26
Payer: COMMERCIAL

## 2025-06-26 LAB
ALBUMIN SERPL-MCNC: 4.7 G/DL (ref 3.6–5.1)
ALP SERPL-CCNC: 77 U/L (ref 35–144)
ALT SERPL-CCNC: 17 U/L (ref 9–46)
ANION GAP SERPL CALCULATED.4IONS-SCNC: 11 MMOL/L (CALC) (ref 7–17)
AST SERPL-CCNC: 20 U/L (ref 10–35)
BASOPHILS # BLD AUTO: 80 CELLS/UL (ref 0–200)
BASOPHILS NFR BLD AUTO: 0.8 %
BILIRUB SERPL-MCNC: 0.3 MG/DL (ref 0.2–1.2)
BUN SERPL-MCNC: 9 MG/DL (ref 7–25)
CALCIUM SERPL-MCNC: 10 MG/DL (ref 8.6–10.3)
CHLORIDE SERPL-SCNC: 101 MMOL/L (ref 98–110)
CO2 SERPL-SCNC: 25 MMOL/L (ref 20–32)
CREAT SERPL-MCNC: 0.73 MG/DL (ref 0.7–1.3)
EGFRCR SERPLBLD CKD-EPI 2021: 105 ML/MIN/1.73M2
EOSINOPHIL # BLD AUTO: 140 CELLS/UL (ref 15–500)
EOSINOPHIL NFR BLD AUTO: 1.4 %
ERYTHROCYTE [DISTWIDTH] IN BLOOD BY AUTOMATED COUNT: 12.9 % (ref 11–15)
GLUCOSE SERPL-MCNC: 103 MG/DL (ref 65–139)
HCT VFR BLD AUTO: 45.7 % (ref 38.5–50)
HGB BLD-MCNC: 14.9 G/DL (ref 13.2–17.1)
LYMPHOCYTES # BLD AUTO: 2650 CELLS/UL (ref 850–3900)
LYMPHOCYTES NFR BLD AUTO: 26.5 %
MCH RBC QN AUTO: 31.6 PG (ref 27–33)
MCHC RBC AUTO-ENTMCNC: 32.6 G/DL (ref 32–36)
MCV RBC AUTO: 97 FL (ref 80–100)
MONOCYTES # BLD AUTO: 700 CELLS/UL (ref 200–950)
MONOCYTES NFR BLD AUTO: 7 %
NEUTROPHILS # BLD AUTO: 6430 CELLS/UL (ref 1500–7800)
NEUTROPHILS NFR BLD AUTO: 64.3 %
PLATELET # BLD AUTO: 269 THOUSAND/UL (ref 140–400)
PMV BLD REES-ECKER: 11.3 FL (ref 7.5–12.5)
POTASSIUM SERPL-SCNC: 5 MMOL/L (ref 3.5–5.3)
PROT SERPL-MCNC: 7.6 G/DL (ref 6.1–8.1)
RBC # BLD AUTO: 4.71 MILLION/UL (ref 4.2–5.8)
SODIUM SERPL-SCNC: 137 MMOL/L (ref 135–146)
WBC # BLD AUTO: 10 THOUSAND/UL (ref 3.8–10.8)

## 2025-07-04 NOTE — Clinical Note
Diagnostic wire inserted. Straight catheterized for a total of 450 cc dark concentrated urine. Tolerated well.

## 2025-07-07 ENCOUNTER — APPOINTMENT (OUTPATIENT)
Dept: RADIOLOGY | Facility: HOSPITAL | Age: 60
End: 2025-07-07
Payer: COMMERCIAL

## 2025-07-08 ENCOUNTER — HOSPITAL ENCOUNTER (OUTPATIENT)
Dept: RADIOLOGY | Facility: HOSPITAL | Age: 60
Discharge: HOME | End: 2025-07-08
Payer: COMMERCIAL

## 2025-07-08 ENCOUNTER — APPOINTMENT (OUTPATIENT)
Dept: RADIOLOGY | Facility: HOSPITAL | Age: 60
End: 2025-07-08
Payer: COMMERCIAL

## 2025-07-08 DIAGNOSIS — M86.672 CHRONIC OSTEOMYELITIS OF LEFT FOOT (MULTI): ICD-10-CM

## 2025-07-08 PROCEDURE — 3430000001 HC RX 343 DIAGNOSTIC RADIOPHARMACEUTICALS: Mod: JZ | Performed by: INTERNAL MEDICINE

## 2025-07-08 PROCEDURE — 78804 RP LOCLZJ TUM WHBDY 2+D IMG: CPT

## 2025-07-08 PROCEDURE — A9547 IN111 OXYQUINOLINE: HCPCS | Mod: JZ | Performed by: INTERNAL MEDICINE

## 2025-07-08 RX ORDER — INDIUM IN-111 OXYQUINOLINE 1 UG/ML
403 SOLUTION INTRAVENOUS
Status: COMPLETED | OUTPATIENT
Start: 2025-07-08 | End: 2025-07-08

## 2025-07-08 RX ADMIN — INDIUM IN-111 OXYQUINOLINE 0.4 MILLICURIE: 1 SOLUTION INTRAVENOUS at 11:38

## 2025-07-09 ENCOUNTER — HOSPITAL ENCOUNTER (OUTPATIENT)
Dept: RADIOLOGY | Facility: HOSPITAL | Age: 60
Discharge: HOME | End: 2025-07-09
Payer: COMMERCIAL

## 2025-07-09 ENCOUNTER — OFFICE VISIT (OUTPATIENT)
Dept: WOUND CARE | Facility: HOSPITAL | Age: 60
End: 2025-07-09
Payer: COMMERCIAL

## 2025-07-09 DIAGNOSIS — M86.672 CHRONIC OSTEOMYELITIS INVOLVING ANKLE AND FOOT, LEFT (MULTI): ICD-10-CM

## 2025-07-09 PROCEDURE — 99214 OFFICE O/P EST MOD 30 MIN: CPT

## 2025-07-09 PROCEDURE — A9541 TC99M SULFUR COLLOID: HCPCS | Performed by: INTERNAL MEDICINE

## 2025-07-09 PROCEDURE — 3430000001 HC RX 343 DIAGNOSTIC RADIOPHARMACEUTICALS: Performed by: INTERNAL MEDICINE

## 2025-07-09 RX ADMIN — TECHNETIUM TC 99M SULFUR COLLOID KIT 10.6 MILLICURIE: KIT at 07:35

## 2025-07-24 DIAGNOSIS — I73.9 PAD (PERIPHERAL ARTERY DISEASE): ICD-10-CM

## 2025-07-25 ENCOUNTER — TELEMEDICINE (OUTPATIENT)
Dept: PRIMARY CARE | Facility: CLINIC | Age: 60
End: 2025-07-25
Payer: COMMERCIAL

## 2025-07-25 DIAGNOSIS — R07.9 LEFT-SIDED CHEST PAIN: Primary | ICD-10-CM

## 2025-07-25 PROCEDURE — 99213 OFFICE O/P EST LOW 20 MIN: CPT | Performed by: INTERNAL MEDICINE

## 2025-07-26 ENCOUNTER — OFFICE VISIT (OUTPATIENT)
Dept: PRIMARY CARE | Facility: CLINIC | Age: 60
End: 2025-07-26
Payer: COMMERCIAL

## 2025-07-26 VITALS
SYSTOLIC BLOOD PRESSURE: 144 MMHG | DIASTOLIC BLOOD PRESSURE: 86 MMHG | HEIGHT: 72 IN | WEIGHT: 234 LBS | BODY MASS INDEX: 31.69 KG/M2

## 2025-07-26 DIAGNOSIS — R53.1 LEFT-SIDED WEAKNESS: ICD-10-CM

## 2025-07-26 DIAGNOSIS — R20.0 NUMBNESS AND TINGLING IN LEFT ARM: Primary | ICD-10-CM

## 2025-07-26 DIAGNOSIS — E78.00 HIGH CHOLESTEROL: ICD-10-CM

## 2025-07-26 DIAGNOSIS — E55.9 VITAMIN D DEFICIENCY: ICD-10-CM

## 2025-07-26 DIAGNOSIS — R53.83 OTHER FATIGUE: ICD-10-CM

## 2025-07-26 DIAGNOSIS — R20.2 NUMBNESS AND TINGLING IN LEFT ARM: Primary | ICD-10-CM

## 2025-07-26 DIAGNOSIS — E11.40 TYPE 2 DIABETES MELLITUS WITH DIABETIC NEUROPATHY, UNSPECIFIED WHETHER LONG TERM INSULIN USE (MULTI): ICD-10-CM

## 2025-07-26 DIAGNOSIS — R60.9 EDEMA, UNSPECIFIED TYPE: ICD-10-CM

## 2025-07-26 DIAGNOSIS — E03.9 HYPOTHYROIDISM, UNSPECIFIED TYPE: ICD-10-CM

## 2025-07-26 DIAGNOSIS — R07.9 CHEST PAIN, UNSPECIFIED TYPE: ICD-10-CM

## 2025-07-26 DIAGNOSIS — Z12.5 ENCOUNTER FOR PROSTATE CANCER SCREENING: ICD-10-CM

## 2025-07-26 DIAGNOSIS — I10 HYPERTENSION, UNSPECIFIED TYPE: ICD-10-CM

## 2025-07-26 PROCEDURE — 3079F DIAST BP 80-89 MM HG: CPT | Performed by: INTERNAL MEDICINE

## 2025-07-26 PROCEDURE — 99214 OFFICE O/P EST MOD 30 MIN: CPT | Performed by: INTERNAL MEDICINE

## 2025-07-26 PROCEDURE — 93000 ELECTROCARDIOGRAM COMPLETE: CPT | Performed by: INTERNAL MEDICINE

## 2025-07-26 PROCEDURE — 3008F BODY MASS INDEX DOCD: CPT | Performed by: INTERNAL MEDICINE

## 2025-07-26 PROCEDURE — 3077F SYST BP >= 140 MM HG: CPT | Performed by: INTERNAL MEDICINE

## 2025-07-26 NOTE — PROGRESS NOTES
Subjective   Patient ID: Connor Farr is a 59 y.o. male who presents for Virtual Visit (Various conditions).    Mr. Farr is not happy.  Weak, tired, fatigued, exhausted, tingling and numbness in left arm, chest pain.  He ran out of his anticoagulation medication.  Weakness.  I advised him to go to emergency room ______ hospital.  He does not want to go hospital.    I have personally reviewed the patient's Past Medical History, Medications, Allergies, Social History, and Family History in the EMR.    Review of Systems   All other systems reviewed and are negative.    Objective   Virtual exam.  There were no vitals taken for this visit.    Physical Exam  The patient has no edema. Left side actual weakness.    LAB WORK: Laboratory testing discussed.    Assessment/Plan   Problem List Items Addressed This Visit    None  Visit Diagnoses         Codes      Left-sided chest pain    -  Primary R07.9        1. Left chest pain, left leg and arm weakness.  I think the patient should go to hospital.  He turned down the request.  He does not want to go to hospital, very high co-pay.  Feeling dizzy.  2. I advised him to go to the hospital to emergency room.  Meanwhile we will try to do blood work as an outpatient.  He is not sure.  3. If he has the time he would like to see me either later today of tomorrow morning 9 o’clock.  I will continue to follow.    Jack Attestation  By signing my name below, IBetzaida Scribe attest that this documentation has been prepared under the direction and in the presence of Roseanna Esqueda MD.     All medical record entries made by the scribe were personally dictated by me I have reviewed the chart and agree the record accurately reflects my personal performance of his history physical examination and management

## 2025-07-26 NOTE — PROGRESS NOTES
Subjective   Patient ID: Connor Farr is a 59 y.o. male who presents for Follow-up (on various conditions).    This gentleman, Mr. Farr today came here because yesterday he has incidence when he has left arm weakness, some short of breath, not feeling good.  He is feeling very weak, tired, fatigued, and exhausted.  Tingling and numbness in the left arm is not gone yet.  He also has consistent problem with both leg weakness and pain, it is not any better.  He came here for follow-up on various conditions.    I have personally reviewed the patient's Past Medical History, Medications, Allergies, Social History, and Family History in the EMR.    Review of Systems   All other systems reviewed and are negative.    Objective   /86   Ht 1.829 m (6')   Wt 106 kg (234 lb)   BMI 31.74 kg/m²     Physical Exam  Vitals reviewed.     Cardiovascular:      Heart sounds: Normal heart sounds, S1 normal and S2 normal. No murmur heard.     No friction rub.   Pulmonary:      Effort: Pulmonary effort is normal.      Breath sounds: Normal breath sounds and air entry.   Abdominal:      Palpations: There is no hepatomegaly, splenomegaly or mass.     Musculoskeletal:      Right lower leg: No edema.      Left lower leg: No edema.      Comments: Legs show tenderness.  Movements painful.   Lymphadenopathy:      Lower Body: No right inguinal adenopathy. No left inguinal adenopathy.     Neurological:      Cranial Nerves: Cranial nerves 2-12 are intact.      Sensory: No sensory deficit.      Motor: Motor function is intact.      Deep Tendon Reflexes: Reflexes are normal and symmetric.     LAB WORK:  Laboratory testing discussed.    Assessment/Plan   Problem List Items Addressed This Visit           ICD-10-CM    Chest pain R07.9    Relevant Medications    apixaban (Eliquis) 5 mg tablet    Other Relevant Orders    ECG 12 lead (Clinic Performed)    Troponin I, High Sensitivity    Edema R60.9    Relevant Medications    apixaban (Eliquis) 5  mg tablet    Other Relevant Orders    Magnesium    Hypertension I10    Relevant Orders    CBC    Urinalysis with Reflex Culture and Microscopic    Hypothyroidism E03.9    Relevant Orders    Thyroid Stimulating Hormone    Type 2 diabetes mellitus with diabetic neuropathy, unspecified whether long term insulin use (Multi) E11.40    Relevant Orders    Hemoglobin A1C     Other Visit Diagnoses         Codes      Numbness and tingling in left arm    -  Primary R20.0, R20.2      High cholesterol     E78.00    Relevant Orders    Comprehensive Metabolic Panel    Lipid Panel      Vitamin D deficiency     E55.9    Relevant Orders    Vitamin D 25-Hydroxy,Total (for eval of Vitamin D levels)      Encounter for prostate cancer screening     Z12.5    Relevant Orders    Prostate Specific Antigen, Screen      Other fatigue     R53.83    Relevant Orders    Vitamin B12      Left-sided weakness     R53.1    Relevant Orders    CT head w and wo IV contrast        1. Left arm tingling and numbness, TIA versus CVA.  He cannot have MRI.  CT scan ordered.  2. Chest pain.  ______ EKG.  I did EKG.  Compared to last EKG, possible cardiac event.  He does not want to go to hospital.  Troponin ordered.  3. Both legs tingling, numbness and pain, not better.  Could be neuropathy.  I doubt it is a circulation problem.  We need work-up.  Basic blood work ordered.  4. Hypertension, okay.  5. High cholesterol, okay.  6. I shall see him back in a week after test.    Scribe Attestation  By signing my name below, Betzaida HARLEY Scribe attest that this documentation has been prepared under the direction and in the presence of Roseanna Esqueda MD.     All medical record entries made by the scribe were personally dictated by me I have reviewed the chart and agree the record accurately reflects my personal performance of his history physical examination and management

## 2025-07-27 LAB
ALBUMIN SERPL-MCNC: 4.7 G/DL (ref 3.6–5.1)
ALP SERPL-CCNC: 83 U/L (ref 35–144)
ALT SERPL-CCNC: 19 U/L (ref 9–46)
ANION GAP SERPL CALCULATED.4IONS-SCNC: 8 MMOL/L (CALC) (ref 7–17)
APPEARANCE UR: CLEAR
AST SERPL-CCNC: 23 U/L (ref 10–35)
BACTERIA #/AREA URNS HPF: ABNORMAL /HPF
BILIRUB SERPL-MCNC: 0.4 MG/DL (ref 0.2–1.2)
BILIRUB UR QL STRIP: NEGATIVE
BUN SERPL-MCNC: 7 MG/DL (ref 7–25)
CALCIUM SERPL-MCNC: 9.8 MG/DL (ref 8.6–10.3)
CHLORIDE SERPL-SCNC: 103 MMOL/L (ref 98–110)
CHOLEST SERPL-MCNC: 153 MG/DL
CHOLEST/HDLC SERPL: 2.8 (CALC)
CO2 SERPL-SCNC: 27 MMOL/L (ref 20–32)
COLOR UR: YELLOW
CREAT SERPL-MCNC: 0.62 MG/DL (ref 0.7–1.3)
EGFRCR SERPLBLD CKD-EPI 2021: 111 ML/MIN/1.73M2
ERYTHROCYTE [DISTWIDTH] IN BLOOD BY AUTOMATED COUNT: 13.1 % (ref 11–15)
EST. AVERAGE GLUCOSE BLD GHB EST-MCNC: 143 MG/DL
EST. AVERAGE GLUCOSE BLD GHB EST-SCNC: 7.9 MMOL/L
GLUCOSE SERPL-MCNC: 112 MG/DL (ref 65–99)
GLUCOSE UR QL STRIP: NEGATIVE
HBA1C MFR BLD: 6.6 %
HCT VFR BLD AUTO: 42.7 % (ref 38.5–50)
HDLC SERPL-MCNC: 55 MG/DL
HGB BLD-MCNC: 14.2 G/DL (ref 13.2–17.1)
HGB UR QL STRIP: NEGATIVE
HYALINE CASTS #/AREA URNS LPF: ABNORMAL /LPF
KETONES UR QL STRIP: ABNORMAL
LDLC SERPL CALC-MCNC: 80 MG/DL (CALC)
LEUKOCYTE ESTERASE UR QL STRIP: ABNORMAL
MCH RBC QN AUTO: 31.9 PG (ref 27–33)
MCHC RBC AUTO-ENTMCNC: 33.3 G/DL (ref 32–36)
MCV RBC AUTO: 96 FL (ref 80–100)
NITRITE UR QL STRIP: NEGATIVE
NONHDLC SERPL-MCNC: 98 MG/DL (CALC)
PH UR STRIP: ABNORMAL [PH] (ref 5–8)
PLATELET # BLD AUTO: 241 THOUSAND/UL (ref 140–400)
PMV BLD REES-ECKER: 11 FL (ref 7.5–12.5)
POTASSIUM SERPL-SCNC: 5.1 MMOL/L (ref 3.5–5.3)
PROT SERPL-MCNC: 7.6 G/DL (ref 6.1–8.1)
PROT UR QL STRIP: NEGATIVE
RBC # BLD AUTO: 4.45 MILLION/UL (ref 4.2–5.8)
RBC #/AREA URNS HPF: ABNORMAL /HPF
SERVICE CMNT-IMP: ABNORMAL
SODIUM SERPL-SCNC: 138 MMOL/L (ref 135–146)
SP GR UR STRIP: 1.02 (ref 1–1.03)
SQUAMOUS #/AREA URNS HPF: ABNORMAL /HPF
TRIGL SERPL-MCNC: 96 MG/DL
TSH SERPL-ACNC: 0.91 MIU/L (ref 0.4–4.5)
WBC # BLD AUTO: 9.2 THOUSAND/UL (ref 3.8–10.8)
WBC #/AREA URNS HPF: ABNORMAL /HPF

## 2025-08-02 ENCOUNTER — OFFICE VISIT (OUTPATIENT)
Dept: PRIMARY CARE | Facility: CLINIC | Age: 60
End: 2025-08-02
Payer: COMMERCIAL

## 2025-08-02 VITALS
BODY MASS INDEX: 31.17 KG/M2 | DIASTOLIC BLOOD PRESSURE: 82 MMHG | WEIGHT: 235.2 LBS | SYSTOLIC BLOOD PRESSURE: 142 MMHG | HEIGHT: 73 IN

## 2025-08-02 DIAGNOSIS — I73.9 PERIPHERAL VASCULAR DISEASE: ICD-10-CM

## 2025-08-02 DIAGNOSIS — E78.00 HIGH CHOLESTEROL: ICD-10-CM

## 2025-08-02 DIAGNOSIS — E11.40 TYPE 2 DIABETES MELLITUS WITH DIABETIC NEUROPATHY, UNSPECIFIED WHETHER LONG TERM INSULIN USE (MULTI): Primary | ICD-10-CM

## 2025-08-02 DIAGNOSIS — I10 HYPERTENSION, UNSPECIFIED TYPE: ICD-10-CM

## 2025-08-02 PROCEDURE — 3079F DIAST BP 80-89 MM HG: CPT | Performed by: INTERNAL MEDICINE

## 2025-08-02 PROCEDURE — 3008F BODY MASS INDEX DOCD: CPT | Performed by: INTERNAL MEDICINE

## 2025-08-02 PROCEDURE — 3077F SYST BP >= 140 MM HG: CPT | Performed by: INTERNAL MEDICINE

## 2025-08-02 PROCEDURE — 99214 OFFICE O/P EST MOD 30 MIN: CPT | Performed by: INTERNAL MEDICINE

## 2025-08-02 RX ORDER — GLIPIZIDE 5 MG/1
5 TABLET ORAL
Qty: 60 TABLET | Refills: 11 | Status: SHIPPED | OUTPATIENT
Start: 2025-08-02 | End: 2026-08-02

## 2025-08-02 NOTE — PROGRESS NOTES
"Subjective   Patient ID: Connor Farr is a 59 y.o. male who presents for Follow-up.    This gentleman today came here for multiple medical issues.  1. He is not at all happy.  His legs are having tingling and numbness, pain continuous.  He has seen all the specialists.  They do not think it is an infection.  It could be combination of neuropathy.  He saw Pain Management.  Neurontin and Lyrica nothing helping.  2. His insurance is very frustrating, they are not paying for his anticoagulation, he has no stent.  He is worried about that too.  He wants to start on Coumadin.    I have personally reviewed the patient's Past Medical History, Medications, Allergies, Social History, and Family History in the EMR.    Review of Systems   All other systems reviewed and are negative.    Objective   /82   Ht 1.854 m (6' 1\")   Wt 107 kg (235 lb 3.2 oz)   BMI 31.03 kg/m²     Physical Exam  Vitals reviewed.     Cardiovascular:      Heart sounds: Normal heart sounds, S1 normal and S2 normal. No murmur heard.     No friction rub.   Pulmonary:      Effort: Pulmonary effort is normal.      Breath sounds: Normal breath sounds and air entry.   Abdominal:      Palpations: There is no hepatomegaly, splenomegaly or mass.     Musculoskeletal:      Right lower leg: No edema.      Left lower leg: No edema.   Lymphadenopathy:      Lower Body: No right inguinal adenopathy. No left inguinal adenopathy.     Neurological:      Cranial Nerves: Cranial nerves 2-12 are intact.      Sensory: No sensory deficit.      Motor: Motor function is intact.      Deep Tendon Reflexes: Reflexes are normal and symmetric.     LAB WORK:  Laboratory testing discussed.    Assessment/Plan   Problem List Items Addressed This Visit           ICD-10-CM    Hypertension I10    Type 2 diabetes mellitus with diabetic neuropathy, unspecified whether long term insulin use (Multi) - Primary E11.40    Relevant Medications    glipiZIDE (Glucotrol) 5 mg tablet     Other " Visit Diagnoses         Codes      Peripheral vascular disease     I73.9      High cholesterol     E78.00        1. Diabetes, type 2.  Glipizide 5 mg twice a day with food explained.  Skip meals, skip pill.  He cannot take metformin.  2. ______.  Thiamine, Folate.  Support care.  3. Peripheral vascular disease, on medication.  4. Hypertension, okay.  5. High cholesterol.  Monitor.  6. Follow-up appointment with me in about two to three weeks’ time.  7. Polypharmacy review.  I urged him to bring his medicines.    Leanna Attestation  By signing my name below, I, Leanna Stuart attest that this documentation has been prepared under the direction and in the presence of Roseanna Esqueda MD.     All medical record entries made by the leanna were personally dictated by me I have reviewed the chart and agree the record accurately reflects my personal performance of his history physical examination and management

## 2025-08-10 ENCOUNTER — APPOINTMENT (OUTPATIENT)
Dept: RADIOLOGY | Facility: HOSPITAL | Age: 60
End: 2025-08-10
Payer: COMMERCIAL

## 2025-08-10 ENCOUNTER — HOSPITAL ENCOUNTER (OUTPATIENT)
Facility: HOSPITAL | Age: 60
Setting detail: OBSERVATION
Discharge: ANOTHER HOSPITAL | End: 2025-08-11
Attending: EMERGENCY MEDICINE | Admitting: INTERNAL MEDICINE
Payer: COMMERCIAL

## 2025-08-10 VITALS
SYSTOLIC BLOOD PRESSURE: 148 MMHG | WEIGHT: 235 LBS | HEART RATE: 111 BPM | OXYGEN SATURATION: 96 % | HEIGHT: 72 IN | TEMPERATURE: 98.1 F | BODY MASS INDEX: 31.83 KG/M2 | RESPIRATION RATE: 18 BRPM | DIASTOLIC BLOOD PRESSURE: 84 MMHG

## 2025-08-10 DIAGNOSIS — M79.605 BILATERAL LEG PAIN: ICD-10-CM

## 2025-08-10 DIAGNOSIS — I73.9 CLAUDICATION OF BOTH LOWER EXTREMITIES: ICD-10-CM

## 2025-08-10 DIAGNOSIS — R06.00 DYSPNEA, UNSPECIFIED TYPE: Primary | ICD-10-CM

## 2025-08-10 DIAGNOSIS — M79.604 BILATERAL LEG PAIN: ICD-10-CM

## 2025-08-10 DIAGNOSIS — J44.1 COPD EXACERBATION (MULTI): ICD-10-CM

## 2025-08-10 DIAGNOSIS — I10 HYPERTENSION, UNSPECIFIED TYPE: ICD-10-CM

## 2025-08-10 LAB
ALBUMIN SERPL BCP-MCNC: 4.2 G/DL (ref 3.4–5)
ALP SERPL-CCNC: 70 U/L (ref 33–120)
ALT SERPL W P-5'-P-CCNC: 21 U/L (ref 10–52)
AMPHETAMINES UR QL SCN: ABNORMAL
ANION GAP SERPL CALCULATED.3IONS-SCNC: 12 MMOL/L (ref 10–20)
APAP SERPL-MCNC: <10 UG/ML (ref ?–30)
APPEARANCE UR: CLEAR
APTT PPP: 24.8 SECONDS (ref 22–32.5)
AST SERPL W P-5'-P-CCNC: 24 U/L (ref 9–39)
BARBITURATES UR QL SCN: ABNORMAL
BASOPHILS # BLD AUTO: 0.08 X10*3/UL (ref 0–0.1)
BASOPHILS NFR BLD AUTO: 0.8 %
BENZODIAZ UR QL SCN: ABNORMAL
BILIRUB SERPL-MCNC: 0.4 MG/DL (ref 0–1.2)
BILIRUB UR STRIP.AUTO-MCNC: NEGATIVE MG/DL
BNP SERPL-MCNC: 31 PG/ML (ref 0–99)
BUN SERPL-MCNC: 7 MG/DL (ref 6–23)
BZE UR QL SCN: ABNORMAL
CALCIUM SERPL-MCNC: 9.1 MG/DL (ref 8.6–10.3)
CANNABINOIDS UR QL SCN: ABNORMAL
CARDIAC TROPONIN I PNL SERPL HS: 6 NG/L (ref 0–20)
CARDIAC TROPONIN I PNL SERPL HS: 8 NG/L (ref 0–20)
CHLORIDE SERPL-SCNC: 103 MMOL/L (ref 98–107)
CO2 SERPL-SCNC: 25 MMOL/L (ref 21–32)
COLOR UR: ABNORMAL
CREAT SERPL-MCNC: 0.63 MG/DL (ref 0.5–1.3)
EGFRCR SERPLBLD CKD-EPI 2021: >90 ML/MIN/1.73M*2
EOSINOPHIL # BLD AUTO: 0.09 X10*3/UL (ref 0–0.7)
EOSINOPHIL NFR BLD AUTO: 0.9 %
ERYTHROCYTE [DISTWIDTH] IN BLOOD BY AUTOMATED COUNT: 13.1 % (ref 11.5–14.5)
ERYTHROCYTE [DISTWIDTH] IN BLOOD BY AUTOMATED COUNT: 13.1 % (ref 11.5–14.5)
ETHANOL SERPL-MCNC: <10 MG/DL
FENTANYL+NORFENTANYL UR QL SCN: ABNORMAL
GLUCOSE SERPL-MCNC: 115 MG/DL (ref 74–99)
GLUCOSE UR STRIP.AUTO-MCNC: NORMAL MG/DL
HCT VFR BLD AUTO: 37.9 % (ref 41–52)
HCT VFR BLD AUTO: 41 % (ref 41–52)
HGB BLD-MCNC: 12.9 G/DL (ref 13.5–17.5)
HGB BLD-MCNC: 14.2 G/DL (ref 13.5–17.5)
IMM GRANULOCYTES # BLD AUTO: 0.02 X10*3/UL (ref 0–0.7)
IMM GRANULOCYTES NFR BLD AUTO: 0.2 % (ref 0–0.9)
KETONES UR STRIP.AUTO-MCNC: NEGATIVE MG/DL
LACTATE SERPL-SCNC: 1.8 MMOL/L (ref 0.4–2)
LACTATE SERPL-SCNC: 2.2 MMOL/L (ref 0.4–2)
LEUKOCYTE ESTERASE UR QL STRIP.AUTO: NEGATIVE
LYMPHOCYTES # BLD AUTO: 1.92 X10*3/UL (ref 1.2–4.8)
LYMPHOCYTES NFR BLD AUTO: 18.9 %
MCH RBC QN AUTO: 31.6 PG (ref 26–34)
MCH RBC QN AUTO: 31.8 PG (ref 26–34)
MCHC RBC AUTO-ENTMCNC: 34 G/DL (ref 32–36)
MCHC RBC AUTO-ENTMCNC: 34.6 G/DL (ref 32–36)
MCV RBC AUTO: 92 FL (ref 80–100)
MCV RBC AUTO: 93 FL (ref 80–100)
METHADONE UR QL SCN: ABNORMAL
MONOCYTES # BLD AUTO: 0.71 X10*3/UL (ref 0.1–1)
MONOCYTES NFR BLD AUTO: 7 %
MUCOUS THREADS #/AREA URNS AUTO: NORMAL /LPF
NEUTROPHILS # BLD AUTO: 7.33 X10*3/UL (ref 1.2–7.7)
NEUTROPHILS NFR BLD AUTO: 72.2 %
NITRITE UR QL STRIP.AUTO: NEGATIVE
NRBC BLD-RTO: 0 /100 WBCS (ref 0–0)
NRBC BLD-RTO: 0 /100 WBCS (ref 0–0)
OPIATES UR QL SCN: ABNORMAL
OXYCODONE+OXYMORPHONE UR QL SCN: ABNORMAL
PCP UR QL SCN: ABNORMAL
PH UR STRIP.AUTO: 7 [PH]
PLATELET # BLD AUTO: 185 X10*3/UL (ref 150–450)
PLATELET # BLD AUTO: 214 X10*3/UL (ref 150–450)
POTASSIUM SERPL-SCNC: 3.9 MMOL/L (ref 3.5–5.3)
PROT SERPL-MCNC: 7.1 G/DL (ref 6.4–8.2)
PROT UR STRIP.AUTO-MCNC: ABNORMAL MG/DL
RBC # BLD AUTO: 4.08 X10*6/UL (ref 4.5–5.9)
RBC # BLD AUTO: 4.46 X10*6/UL (ref 4.5–5.9)
RBC # UR STRIP.AUTO: NEGATIVE MG/DL
RBC #/AREA URNS AUTO: NORMAL /HPF
SALICYLATES SERPL-MCNC: <3 MG/DL (ref ?–20)
SODIUM SERPL-SCNC: 136 MMOL/L (ref 136–145)
SP GR UR STRIP.AUTO: >1.05
TSH SERPL-ACNC: 1.3 MIU/L (ref 0.44–3.98)
UROBILINOGEN UR STRIP.AUTO-MCNC: NORMAL MG/DL
WBC # BLD AUTO: 10.2 X10*3/UL (ref 4.4–11.3)
WBC # BLD AUTO: 8.5 X10*3/UL (ref 4.4–11.3)
WBC #/AREA URNS AUTO: NORMAL /HPF

## 2025-08-10 PROCEDURE — 71275 CT ANGIOGRAPHY CHEST: CPT

## 2025-08-10 PROCEDURE — 93970 EXTREMITY STUDY: CPT | Performed by: RADIOLOGY

## 2025-08-10 PROCEDURE — 71275 CT ANGIOGRAPHY CHEST: CPT | Performed by: RADIOLOGY

## 2025-08-10 PROCEDURE — 84484 ASSAY OF TROPONIN QUANT: CPT | Performed by: EMERGENCY MEDICINE

## 2025-08-10 PROCEDURE — G0378 HOSPITAL OBSERVATION PER HR: HCPCS

## 2025-08-10 PROCEDURE — 2500000001 HC RX 250 WO HCPCS SELF ADMINISTERED DRUGS (ALT 637 FOR MEDICARE OP): Performed by: EMERGENCY MEDICINE

## 2025-08-10 PROCEDURE — 2550000001 HC RX 255 CONTRASTS: Performed by: EMERGENCY MEDICINE

## 2025-08-10 PROCEDURE — 85025 COMPLETE CBC W/AUTO DIFF WBC: CPT | Performed by: EMERGENCY MEDICINE

## 2025-08-10 PROCEDURE — 2500000004 HC RX 250 GENERAL PHARMACY W/ HCPCS (ALT 636 FOR OP/ED): Performed by: EMERGENCY MEDICINE

## 2025-08-10 PROCEDURE — 83880 ASSAY OF NATRIURETIC PEPTIDE: CPT | Performed by: EMERGENCY MEDICINE

## 2025-08-10 PROCEDURE — 80053 COMPREHEN METABOLIC PANEL: CPT | Performed by: EMERGENCY MEDICINE

## 2025-08-10 PROCEDURE — 85730 THROMBOPLASTIN TIME PARTIAL: CPT | Performed by: EMERGENCY MEDICINE

## 2025-08-10 PROCEDURE — 96375 TX/PRO/DX INJ NEW DRUG ADDON: CPT | Mod: 59

## 2025-08-10 PROCEDURE — 36415 COLL VENOUS BLD VENIPUNCTURE: CPT | Performed by: EMERGENCY MEDICINE

## 2025-08-10 PROCEDURE — 85027 COMPLETE CBC AUTOMATED: CPT | Performed by: EMERGENCY MEDICINE

## 2025-08-10 PROCEDURE — 96361 HYDRATE IV INFUSION ADD-ON: CPT | Mod: 59

## 2025-08-10 PROCEDURE — 99285 EMERGENCY DEPT VISIT HI MDM: CPT | Mod: 25 | Performed by: EMERGENCY MEDICINE

## 2025-08-10 PROCEDURE — 74174 CTA ABD&PLVS W/CONTRAST: CPT | Performed by: RADIOLOGY

## 2025-08-10 PROCEDURE — 71045 X-RAY EXAM CHEST 1 VIEW: CPT

## 2025-08-10 PROCEDURE — 80143 DRUG ASSAY ACETAMINOPHEN: CPT | Performed by: EMERGENCY MEDICINE

## 2025-08-10 PROCEDURE — 96374 THER/PROPH/DIAG INJ IV PUSH: CPT | Mod: 59

## 2025-08-10 PROCEDURE — 71045 X-RAY EXAM CHEST 1 VIEW: CPT | Performed by: RADIOLOGY

## 2025-08-10 PROCEDURE — 83605 ASSAY OF LACTIC ACID: CPT | Performed by: EMERGENCY MEDICINE

## 2025-08-10 PROCEDURE — 80307 DRUG TEST PRSMV CHEM ANLYZR: CPT | Performed by: EMERGENCY MEDICINE

## 2025-08-10 PROCEDURE — 2500000002 HC RX 250 W HCPCS SELF ADMINISTERED DRUGS (ALT 637 FOR MEDICARE OP, ALT 636 FOR OP/ED): Performed by: EMERGENCY MEDICINE

## 2025-08-10 PROCEDURE — 94640 AIRWAY INHALATION TREATMENT: CPT

## 2025-08-10 PROCEDURE — 81001 URINALYSIS AUTO W/SCOPE: CPT | Mod: 59 | Performed by: EMERGENCY MEDICINE

## 2025-08-10 PROCEDURE — 93970 EXTREMITY STUDY: CPT

## 2025-08-10 PROCEDURE — 84443 ASSAY THYROID STIM HORMONE: CPT | Performed by: EMERGENCY MEDICINE

## 2025-08-10 RX ORDER — METOPROLOL SUCCINATE 100 MG/1
100 TABLET, EXTENDED RELEASE ORAL DAILY
Status: DISCONTINUED | OUTPATIENT
Start: 2025-08-11 | End: 2025-08-11 | Stop reason: HOSPADM

## 2025-08-10 RX ORDER — HEPARIN SODIUM 10000 [USP'U]/100ML
0-4000 INJECTION, SOLUTION INTRAVENOUS CONTINUOUS
Status: DISCONTINUED | OUTPATIENT
Start: 2025-08-10 | End: 2025-08-11 | Stop reason: HOSPADM

## 2025-08-10 RX ORDER — MORPHINE SULFATE 2 MG/ML
2 INJECTION, SOLUTION INTRAMUSCULAR; INTRAVENOUS
Status: DISCONTINUED | OUTPATIENT
Start: 2025-08-10 | End: 2025-08-11 | Stop reason: HOSPADM

## 2025-08-10 RX ORDER — ACETAMINOPHEN 325 MG/1
975 TABLET ORAL ONCE
Status: COMPLETED | OUTPATIENT
Start: 2025-08-10 | End: 2025-08-10

## 2025-08-10 RX ORDER — MORPHINE SULFATE 4 MG/ML
4 INJECTION, SOLUTION INTRAMUSCULAR; INTRAVENOUS ONCE
Status: COMPLETED | OUTPATIENT
Start: 2025-08-10 | End: 2025-08-10

## 2025-08-10 RX ORDER — TRAMADOL HYDROCHLORIDE 50 MG/1
50 TABLET, FILM COATED ORAL ONCE
Status: COMPLETED | OUTPATIENT
Start: 2025-08-10 | End: 2025-08-10

## 2025-08-10 RX ORDER — PANTOPRAZOLE SODIUM 40 MG/1
40 TABLET, DELAYED RELEASE ORAL
Status: DISCONTINUED | OUTPATIENT
Start: 2025-08-11 | End: 2025-08-11 | Stop reason: HOSPADM

## 2025-08-10 RX ORDER — IPRATROPIUM BROMIDE AND ALBUTEROL SULFATE 2.5; .5 MG/3ML; MG/3ML
3 SOLUTION RESPIRATORY (INHALATION) ONCE
Status: COMPLETED | OUTPATIENT
Start: 2025-08-10 | End: 2025-08-10

## 2025-08-10 RX ORDER — TALC
6 POWDER (GRAM) TOPICAL NIGHTLY PRN
Status: DISCONTINUED | OUTPATIENT
Start: 2025-08-10 | End: 2025-08-11 | Stop reason: HOSPADM

## 2025-08-10 RX ORDER — IPRATROPIUM BROMIDE AND ALBUTEROL SULFATE 2.5; .5 MG/3ML; MG/3ML
3 SOLUTION RESPIRATORY (INHALATION) EVERY 6 HOURS PRN
Status: DISCONTINUED | OUTPATIENT
Start: 2025-08-10 | End: 2025-08-11 | Stop reason: HOSPADM

## 2025-08-10 RX ORDER — HEPARIN SODIUM 5000 [USP'U]/ML
4000 INJECTION, SOLUTION INTRAVENOUS; SUBCUTANEOUS ONCE
Status: COMPLETED | OUTPATIENT
Start: 2025-08-10 | End: 2025-08-10

## 2025-08-10 RX ORDER — ONDANSETRON HYDROCHLORIDE 2 MG/ML
4 INJECTION, SOLUTION INTRAVENOUS ONCE
Status: COMPLETED | OUTPATIENT
Start: 2025-08-10 | End: 2025-08-10

## 2025-08-10 RX ORDER — HEPARIN SODIUM 5000 [USP'U]/ML
2000-4000 INJECTION, SOLUTION INTRAVENOUS; SUBCUTANEOUS AS NEEDED
Status: DISCONTINUED | OUTPATIENT
Start: 2025-08-10 | End: 2025-08-11 | Stop reason: HOSPADM

## 2025-08-10 RX ORDER — ONDANSETRON HYDROCHLORIDE 2 MG/ML
4 INJECTION, SOLUTION INTRAVENOUS EVERY 4 HOURS PRN
Status: DISCONTINUED | OUTPATIENT
Start: 2025-08-10 | End: 2025-08-11 | Stop reason: HOSPADM

## 2025-08-10 RX ORDER — LOSARTAN POTASSIUM 100 MG/1
100 TABLET ORAL DAILY
Status: DISCONTINUED | OUTPATIENT
Start: 2025-08-11 | End: 2025-08-11 | Stop reason: HOSPADM

## 2025-08-10 RX ORDER — LEVOTHYROXINE SODIUM 75 UG/1
75 TABLET ORAL
Status: DISCONTINUED | OUTPATIENT
Start: 2025-08-11 | End: 2025-08-11 | Stop reason: HOSPADM

## 2025-08-10 RX ORDER — ROSUVASTATIN CALCIUM 20 MG/1
20 TABLET, COATED ORAL DAILY
Status: DISCONTINUED | OUTPATIENT
Start: 2025-08-11 | End: 2025-08-11 | Stop reason: HOSPADM

## 2025-08-10 RX ADMIN — SODIUM CHLORIDE 1000 ML: 900 INJECTION, SOLUTION INTRAVENOUS at 19:07

## 2025-08-10 RX ADMIN — ACETAMINOPHEN 975 MG: 325 TABLET ORAL at 19:06

## 2025-08-10 RX ADMIN — MORPHINE SULFATE 4 MG: 4 INJECTION, SOLUTION INTRAMUSCULAR; INTRAVENOUS at 19:05

## 2025-08-10 RX ADMIN — METHYLPREDNISOLONE SODIUM SUCCINATE 125 MG: 125 INJECTION, POWDER, FOR SOLUTION INTRAMUSCULAR; INTRAVENOUS at 19:05

## 2025-08-10 RX ADMIN — IOHEXOL 120 ML: 350 INJECTION, SOLUTION INTRAVENOUS at 17:36

## 2025-08-10 RX ADMIN — IPRATROPIUM BROMIDE AND ALBUTEROL SULFATE 3 ML: 2.5; .5 SOLUTION RESPIRATORY (INHALATION) at 19:06

## 2025-08-10 RX ADMIN — HEPARIN SODIUM 4000 UNITS: 5000 INJECTION, SOLUTION INTRAVENOUS; SUBCUTANEOUS at 21:28

## 2025-08-10 RX ADMIN — ONDANSETRON 4 MG: 2 INJECTION, SOLUTION INTRAMUSCULAR; INTRAVENOUS at 19:05

## 2025-08-10 RX ADMIN — HEPARIN SODIUM 1000 UNITS/HR: 10000 INJECTION, SOLUTION INTRAVENOUS at 21:27

## 2025-08-10 RX ADMIN — TRAMADOL HYDROCHLORIDE 50 MG: 50 TABLET, COATED ORAL at 19:11

## 2025-08-10 SDOH — SOCIAL STABILITY: SOCIAL INSECURITY: DO YOU FEEL ANYONE HAS EXPLOITED OR TAKEN ADVANTAGE OF YOU FINANCIALLY OR OF YOUR PERSONAL PROPERTY?: NO

## 2025-08-10 SDOH — SOCIAL STABILITY: SOCIAL INSECURITY: HAS ANYONE EVER THREATENED TO HURT YOUR FAMILY OR YOUR PETS?: NO

## 2025-08-10 SDOH — SOCIAL STABILITY: SOCIAL INSECURITY: HAVE YOU HAD THOUGHTS OF HARMING ANYONE ELSE?: NO

## 2025-08-10 SDOH — SOCIAL STABILITY: SOCIAL INSECURITY: HAVE YOU HAD ANY THOUGHTS OF HARMING ANYONE ELSE?: NO

## 2025-08-10 SDOH — SOCIAL STABILITY: SOCIAL INSECURITY: ARE THERE ANY APPARENT SIGNS OF INJURIES/BEHAVIORS THAT COULD BE RELATED TO ABUSE/NEGLECT?: NO

## 2025-08-10 SDOH — SOCIAL STABILITY: SOCIAL INSECURITY: DOES ANYONE TRY TO KEEP YOU FROM HAVING/CONTACTING OTHER FRIENDS OR DOING THINGS OUTSIDE YOUR HOME?: NO

## 2025-08-10 SDOH — SOCIAL STABILITY: SOCIAL INSECURITY: ARE YOU OR HAVE YOU BEEN THREATENED OR ABUSED PHYSICALLY, EMOTIONALLY, OR SEXUALLY BY ANYONE?: NO

## 2025-08-10 SDOH — SOCIAL STABILITY: SOCIAL INSECURITY: ABUSE: ADULT

## 2025-08-10 SDOH — SOCIAL STABILITY: SOCIAL INSECURITY: WERE YOU ABLE TO COMPLETE ALL THE BEHAVIORAL HEALTH SCREENINGS?: YES

## 2025-08-10 SDOH — SOCIAL STABILITY: SOCIAL INSECURITY: DO YOU FEEL UNSAFE GOING BACK TO THE PLACE WHERE YOU ARE LIVING?: NO

## 2025-08-10 ASSESSMENT — PATIENT HEALTH QUESTIONNAIRE - PHQ9
SUM OF ALL RESPONSES TO PHQ9 QUESTIONS 1 & 2: 0
2. FEELING DOWN, DEPRESSED OR HOPELESS: NOT AT ALL
1. LITTLE INTEREST OR PLEASURE IN DOING THINGS: NOT AT ALL

## 2025-08-10 ASSESSMENT — COGNITIVE AND FUNCTIONAL STATUS - GENERAL
MOBILITY SCORE: 24
DAILY ACTIVITIY SCORE: 24

## 2025-08-10 ASSESSMENT — LIFESTYLE VARIABLES
HOW OFTEN DO YOU HAVE A DRINK CONTAINING ALCOHOL: 2-4 TIMES A MONTH
HAVE PEOPLE ANNOYED YOU BY CRITICIZING YOUR DRINKING: NO
HOW MANY STANDARD DRINKS CONTAINING ALCOHOL DO YOU HAVE ON A TYPICAL DAY: 3 OR 4
AUDIT-C TOTAL SCORE: 4
HOW OFTEN DO YOU HAVE 6 OR MORE DRINKS ON ONE OCCASION: LESS THAN MONTHLY
AUDIT-C TOTAL SCORE: 4
SKIP TO QUESTIONS 9-10: 0
EVER FELT BAD OR GUILTY ABOUT YOUR DRINKING: NO
EVER HAD A DRINK FIRST THING IN THE MORNING TO STEADY YOUR NERVES TO GET RID OF A HANGOVER: NO
HAVE YOU EVER FELT YOU SHOULD CUT DOWN ON YOUR DRINKING: NO
TOTAL SCORE: 0

## 2025-08-10 ASSESSMENT — PAIN SCALES - GENERAL
PAINLEVEL_OUTOF10: 10 - WORST POSSIBLE PAIN
PAINLEVEL_OUTOF10: 10 - WORST POSSIBLE PAIN

## 2025-08-10 ASSESSMENT — PAIN DESCRIPTION - ORIENTATION
ORIENTATION: RIGHT;LEFT
ORIENTATION: RIGHT;LEFT

## 2025-08-10 ASSESSMENT — PAIN - FUNCTIONAL ASSESSMENT
PAIN_FUNCTIONAL_ASSESSMENT: 0-10
PAIN_FUNCTIONAL_ASSESSMENT: 0-10

## 2025-08-10 ASSESSMENT — PAIN DESCRIPTION - DESCRIPTORS: DESCRIPTORS: ACHING

## 2025-08-10 ASSESSMENT — PAIN DESCRIPTION - LOCATION
LOCATION: LEG
LOCATION: LEG

## 2025-08-11 ENCOUNTER — ANESTHESIA EVENT (OUTPATIENT)
Dept: OPERATING ROOM | Facility: HOSPITAL | Age: 60
End: 2025-08-11
Payer: COMMERCIAL

## 2025-08-11 ENCOUNTER — APPOINTMENT (OUTPATIENT)
Dept: RADIOLOGY | Facility: HOSPITAL | Age: 60
End: 2025-08-11
Payer: COMMERCIAL

## 2025-08-11 ENCOUNTER — HOSPITAL ENCOUNTER (INPATIENT)
Facility: HOSPITAL | Age: 60
LOS: 4 days | Discharge: HOME HEALTH | End: 2025-08-15
Attending: EMERGENCY MEDICINE | Admitting: STUDENT IN AN ORGANIZED HEALTH CARE EDUCATION/TRAINING PROGRAM
Payer: COMMERCIAL

## 2025-08-11 ENCOUNTER — ANESTHESIA (OUTPATIENT)
Dept: OPERATING ROOM | Facility: HOSPITAL | Age: 60
End: 2025-08-11
Payer: COMMERCIAL

## 2025-08-11 DIAGNOSIS — Z95.828 S/P FEMORAL-POPLITEAL BYPASS SURGERY: ICD-10-CM

## 2025-08-11 DIAGNOSIS — I73.9 PERIPHERAL VASCULAR DISEASE: ICD-10-CM

## 2025-08-11 DIAGNOSIS — Z01.818 ENCOUNTER FOR OTHER PREPROCEDURAL EXAMINATION: ICD-10-CM

## 2025-08-11 DIAGNOSIS — M79.672 LEFT FOOT PAIN: Primary | ICD-10-CM

## 2025-08-11 DIAGNOSIS — I73.9 PAD (PERIPHERAL ARTERY DISEASE): ICD-10-CM

## 2025-08-11 LAB
ABO GROUP (TYPE) IN BLOOD: NORMAL
ABO GROUP (TYPE) IN BLOOD: NORMAL
ACT BLD: 158 SEC (ref 83–199)
ALBUMIN SERPL BCP-MCNC: 4 G/DL (ref 3.4–5)
ALP SERPL-CCNC: 69 U/L (ref 33–120)
ALT SERPL W P-5'-P-CCNC: 21 U/L (ref 10–52)
ANION GAP SERPL CALC-SCNC: 16 MMOL/L (ref 10–20)
ANTIBODY SCREEN: NORMAL
AST SERPL W P-5'-P-CCNC: 20 U/L (ref 9–39)
BILIRUB SERPL-MCNC: 0.3 MG/DL (ref 0–1.2)
BUN SERPL-MCNC: 7 MG/DL (ref 6–23)
CALCIUM SERPL-MCNC: 9.2 MG/DL (ref 8.6–10.6)
CHLORIDE SERPL-SCNC: 101 MMOL/L (ref 98–107)
CO2 SERPL-SCNC: 22 MMOL/L (ref 21–32)
CREAT SERPL-MCNC: 0.55 MG/DL (ref 0.5–1.3)
EGFRCR SERPLBLD CKD-EPI 2021: >90 ML/MIN/1.73M*2
ERYTHROCYTE [DISTWIDTH] IN BLOOD BY AUTOMATED COUNT: 12.9 % (ref 11.5–14.5)
GLUCOSE BLD MANUAL STRIP-MCNC: 138 MG/DL (ref 74–99)
GLUCOSE BLD MANUAL STRIP-MCNC: 176 MG/DL (ref 74–99)
GLUCOSE SERPL-MCNC: 175 MG/DL (ref 74–99)
HCT VFR BLD AUTO: 39.1 % (ref 41–52)
HGB BLD-MCNC: 13.5 G/DL (ref 13.5–17.5)
MCH RBC QN AUTO: 31.4 PG (ref 26–34)
MCHC RBC AUTO-ENTMCNC: 34.5 G/DL (ref 32–36)
MCV RBC AUTO: 91 FL (ref 80–100)
NRBC BLD-RTO: 0 /100 WBCS (ref 0–0)
PLATELET # BLD AUTO: 213 X10*3/UL (ref 150–450)
POTASSIUM SERPL-SCNC: 4.2 MMOL/L (ref 3.5–5.3)
PROT SERPL-MCNC: 6.7 G/DL (ref 6.4–8.2)
RBC # BLD AUTO: 4.3 X10*6/UL (ref 4.5–5.9)
RH FACTOR (ANTIGEN D): NORMAL
RH FACTOR (ANTIGEN D): NORMAL
SODIUM SERPL-SCNC: 135 MMOL/L (ref 136–145)
UFH PPP CHRO-ACNC: 0.2 IU/ML (ref ?–1.1)
UFH PPP CHRO-ACNC: 0.3 IU/ML (ref ?–1.1)
UFH PPP CHRO-ACNC: 0.3 IU/ML (ref ?–1.1)
UFH PPP CHRO-ACNC: <0.1 IU/ML (ref ?–1.1)
WBC # BLD AUTO: 8.8 X10*3/UL (ref 4.4–11.3)

## 2025-08-11 PROCEDURE — 85520 HEPARIN ASSAY: CPT | Performed by: STUDENT IN AN ORGANIZED HEALTH CARE EDUCATION/TRAINING PROGRAM

## 2025-08-11 PROCEDURE — 3600000003 HC OR TIME - INITIAL BASE CHARGE - PROCEDURE LEVEL THREE: Performed by: SURGERY

## 2025-08-11 PROCEDURE — 36415 COLL VENOUS BLD VENIPUNCTURE: CPT | Performed by: STUDENT IN AN ORGANIZED HEALTH CARE EDUCATION/TRAINING PROGRAM

## 2025-08-11 PROCEDURE — 75635 CT ANGIO ABDOMINAL ARTERIES: CPT

## 2025-08-11 PROCEDURE — 3600000008 HC OR TIME - EACH INCREMENTAL 1 MINUTE - PROCEDURE LEVEL THREE: Performed by: SURGERY

## 2025-08-11 PROCEDURE — 82947 ASSAY GLUCOSE BLOOD QUANT: CPT

## 2025-08-11 PROCEDURE — 2550000001 HC RX 255 CONTRASTS: Performed by: EMERGENCY MEDICINE

## 2025-08-11 PROCEDURE — 99285 EMERGENCY DEPT VISIT HI MDM: CPT | Performed by: EMERGENCY MEDICINE

## 2025-08-11 PROCEDURE — 2500000005 HC RX 250 GENERAL PHARMACY W/O HCPCS: Performed by: SURGERY

## 2025-08-11 PROCEDURE — 2550000001 HC RX 255 CONTRASTS: Performed by: SURGERY

## 2025-08-11 PROCEDURE — 1100000001 HC PRIVATE ROOM DAILY

## 2025-08-11 PROCEDURE — 3600000004 HC OR TIME - INITIAL BASE CHARGE - PROCEDURE LEVEL FOUR: Performed by: SURGERY

## 2025-08-11 PROCEDURE — C1769 GUIDE WIRE: HCPCS | Performed by: SURGERY

## 2025-08-11 PROCEDURE — 2500000004 HC RX 250 GENERAL PHARMACY W/ HCPCS (ALT 636 FOR OP/ED): Performed by: SURGERY

## 2025-08-11 PROCEDURE — 75774 ARTERY X-RAY EACH VESSEL: CPT | Performed by: SURGERY

## 2025-08-11 PROCEDURE — 36415 COLL VENOUS BLD VENIPUNCTURE: CPT

## 2025-08-11 PROCEDURE — 99223 1ST HOSP IP/OBS HIGH 75: CPT | Performed by: SURGERY

## 2025-08-11 PROCEDURE — 36246 INS CATH ABD/L-EXT ART 2ND: CPT | Performed by: SURGERY

## 2025-08-11 PROCEDURE — 2500000004 HC RX 250 GENERAL PHARMACY W/ HCPCS (ALT 636 FOR OP/ED): Performed by: STUDENT IN AN ORGANIZED HEALTH CARE EDUCATION/TRAINING PROGRAM

## 2025-08-11 PROCEDURE — 2720000007 HC OR 272 NO HCPCS: Performed by: SURGERY

## 2025-08-11 PROCEDURE — 3700000002 HC GENERAL ANESTHESIA TIME - EACH INCREMENTAL 1 MINUTE: Performed by: SURGERY

## 2025-08-11 PROCEDURE — 85347 COAGULATION TIME ACTIVATED: CPT

## 2025-08-11 PROCEDURE — 3700000001 HC GENERAL ANESTHESIA TIME - INITIAL BASE CHARGE: Performed by: SURGERY

## 2025-08-11 PROCEDURE — 2500000002 HC RX 250 W HCPCS SELF ADMINISTERED DRUGS (ALT 637 FOR MEDICARE OP, ALT 636 FOR OP/ED): Performed by: STUDENT IN AN ORGANIZED HEALTH CARE EDUCATION/TRAINING PROGRAM

## 2025-08-11 PROCEDURE — 7100000002 HC RECOVERY ROOM TIME - EACH INCREMENTAL 1 MINUTE: Performed by: SURGERY

## 2025-08-11 PROCEDURE — 2500000001 HC RX 250 WO HCPCS SELF ADMINISTERED DRUGS (ALT 637 FOR MEDICARE OP): Performed by: STUDENT IN AN ORGANIZED HEALTH CARE EDUCATION/TRAINING PROGRAM

## 2025-08-11 PROCEDURE — 85027 COMPLETE CBC AUTOMATED: CPT

## 2025-08-11 PROCEDURE — 75635 CT ANGIO ABDOMINAL ARTERIES: CPT | Performed by: RADIOLOGY

## 2025-08-11 PROCEDURE — B41D1ZZ FLUOROSCOPY OF AORTA AND BILATERAL LOWER EXTREMITY ARTERIES USING LOW OSMOLAR CONTRAST: ICD-10-PCS | Performed by: SURGERY

## 2025-08-11 PROCEDURE — 84075 ASSAY ALKALINE PHOSPHATASE: CPT | Performed by: STUDENT IN AN ORGANIZED HEALTH CARE EDUCATION/TRAINING PROGRAM

## 2025-08-11 PROCEDURE — 2500000004 HC RX 250 GENERAL PHARMACY W/ HCPCS (ALT 636 FOR OP/ED): Performed by: INTERNAL MEDICINE

## 2025-08-11 PROCEDURE — 2500000004 HC RX 250 GENERAL PHARMACY W/ HCPCS (ALT 636 FOR OP/ED)

## 2025-08-11 PROCEDURE — 75630 X-RAY AORTA LEG ARTERIES: CPT | Performed by: SURGERY

## 2025-08-11 PROCEDURE — 85520 HEPARIN ASSAY: CPT

## 2025-08-11 PROCEDURE — G0378 HOSPITAL OBSERVATION PER HR: HCPCS

## 2025-08-11 PROCEDURE — 7100000001 HC RECOVERY ROOM TIME - INITIAL BASE CHARGE: Performed by: SURGERY

## 2025-08-11 PROCEDURE — 3600000009 HC OR TIME - EACH INCREMENTAL 1 MINUTE - PROCEDURE LEVEL FOUR: Performed by: SURGERY

## 2025-08-11 PROCEDURE — 86901 BLOOD TYPING SEROLOGIC RH(D): CPT | Performed by: STUDENT IN AN ORGANIZED HEALTH CARE EDUCATION/TRAINING PROGRAM

## 2025-08-11 RX ORDER — ONDANSETRON 4 MG/1
4 TABLET, FILM COATED ORAL EVERY 8 HOURS PRN
Status: DISCONTINUED | OUTPATIENT
Start: 2025-08-11 | End: 2025-08-15 | Stop reason: HOSPADM

## 2025-08-11 RX ORDER — ACETAMINOPHEN 650 MG/1
650 SUPPOSITORY RECTAL EVERY 4 HOURS PRN
Status: DISCONTINUED | OUTPATIENT
Start: 2025-08-11 | End: 2025-08-13

## 2025-08-11 RX ORDER — ACETAMINOPHEN 160 MG/5ML
650 SOLUTION ORAL EVERY 4 HOURS PRN
Status: DISCONTINUED | OUTPATIENT
Start: 2025-08-11 | End: 2025-08-13

## 2025-08-11 RX ORDER — ACETAMINOPHEN 325 MG/1
650 TABLET ORAL EVERY 4 HOURS PRN
Status: DISCONTINUED | OUTPATIENT
Start: 2025-08-11 | End: 2025-08-11 | Stop reason: HOSPADM

## 2025-08-11 RX ORDER — LOSARTAN POTASSIUM 50 MG/1
100 TABLET ORAL DAILY
Status: DISCONTINUED | OUTPATIENT
Start: 2025-08-11 | End: 2025-08-15 | Stop reason: HOSPADM

## 2025-08-11 RX ORDER — HYDROMORPHONE HYDROCHLORIDE 0.2 MG/ML
0.2 INJECTION INTRAMUSCULAR; INTRAVENOUS; SUBCUTANEOUS EVERY 5 MIN PRN
Status: DISCONTINUED | OUTPATIENT
Start: 2025-08-11 | End: 2025-08-11 | Stop reason: HOSPADM

## 2025-08-11 RX ORDER — FENTANYL CITRATE 50 UG/ML
INJECTION, SOLUTION INTRAMUSCULAR; INTRAVENOUS AS NEEDED
Status: DISCONTINUED | OUTPATIENT
Start: 2025-08-11 | End: 2025-08-11

## 2025-08-11 RX ORDER — SODIUM CHLORIDE 0.9 G/100ML
INJECTION, SOLUTION IRRIGATION AS NEEDED
Status: DISCONTINUED | OUTPATIENT
Start: 2025-08-11 | End: 2025-08-11 | Stop reason: HOSPADM

## 2025-08-11 RX ORDER — ACETAMINOPHEN 325 MG/1
650 TABLET ORAL EVERY 4 HOURS PRN
Status: DISCONTINUED | OUTPATIENT
Start: 2025-08-11 | End: 2025-08-13

## 2025-08-11 RX ORDER — LEVOTHYROXINE SODIUM 75 UG/1
75 TABLET ORAL DAILY
Status: DISCONTINUED | OUTPATIENT
Start: 2025-08-11 | End: 2025-08-11

## 2025-08-11 RX ORDER — ONDANSETRON HYDROCHLORIDE 2 MG/ML
4 INJECTION, SOLUTION INTRAVENOUS EVERY 8 HOURS PRN
Status: DISCONTINUED | OUTPATIENT
Start: 2025-08-11 | End: 2025-08-15 | Stop reason: HOSPADM

## 2025-08-11 RX ORDER — LIDOCAINE HCL/PF 100 MG/5ML
SYRINGE (ML) INTRAVENOUS AS NEEDED
Status: DISCONTINUED | OUTPATIENT
Start: 2025-08-11 | End: 2025-08-11

## 2025-08-11 RX ORDER — HEPARIN SODIUM 10000 [USP'U]/100ML
0-4500 INJECTION, SOLUTION INTRAVENOUS CONTINUOUS
Status: DISCONTINUED | OUTPATIENT
Start: 2025-08-11 | End: 2025-08-11

## 2025-08-11 RX ORDER — METOPROLOL SUCCINATE 50 MG/1
100 TABLET, EXTENDED RELEASE ORAL DAILY
Status: DISCONTINUED | OUTPATIENT
Start: 2025-08-11 | End: 2025-08-13

## 2025-08-11 RX ORDER — PANTOPRAZOLE SODIUM 40 MG/10ML
40 INJECTION, POWDER, LYOPHILIZED, FOR SOLUTION INTRAVENOUS
Status: DISCONTINUED | OUTPATIENT
Start: 2025-08-11 | End: 2025-08-15 | Stop reason: HOSPADM

## 2025-08-11 RX ORDER — IPRATROPIUM BROMIDE AND ALBUTEROL SULFATE 2.5; .5 MG/3ML; MG/3ML
3 SOLUTION RESPIRATORY (INHALATION) EVERY 6 HOURS PRN
Qty: 180 ML | Refills: 11 | Status: SHIPPED | OUTPATIENT
Start: 2025-08-11 | End: 2025-08-15 | Stop reason: HOSPADM

## 2025-08-11 RX ORDER — ONDANSETRON HYDROCHLORIDE 2 MG/ML
4 INJECTION, SOLUTION INTRAVENOUS ONCE AS NEEDED
Status: DISCONTINUED | OUTPATIENT
Start: 2025-08-11 | End: 2025-08-11 | Stop reason: HOSPADM

## 2025-08-11 RX ORDER — DROPERIDOL 2.5 MG/ML
0.62 INJECTION, SOLUTION INTRAMUSCULAR; INTRAVENOUS ONCE AS NEEDED
Status: DISCONTINUED | OUTPATIENT
Start: 2025-08-11 | End: 2025-08-11 | Stop reason: HOSPADM

## 2025-08-11 RX ORDER — LEVOTHYROXINE SODIUM 75 UG/1
75 TABLET ORAL DAILY
Status: DISCONTINUED | OUTPATIENT
Start: 2025-08-11 | End: 2025-08-15 | Stop reason: HOSPADM

## 2025-08-11 RX ORDER — IODIXANOL 320 MG/ML
INJECTION, SOLUTION INTRAVASCULAR AS NEEDED
Status: DISCONTINUED | OUTPATIENT
Start: 2025-08-11 | End: 2025-08-11 | Stop reason: HOSPADM

## 2025-08-11 RX ORDER — CEFAZOLIN 1 G/1
INJECTION, POWDER, FOR SOLUTION INTRAVENOUS AS NEEDED
Status: DISCONTINUED | OUTPATIENT
Start: 2025-08-11 | End: 2025-08-11

## 2025-08-11 RX ORDER — ALBUTEROL SULFATE 0.83 MG/ML
2.5 SOLUTION RESPIRATORY (INHALATION) ONCE AS NEEDED
Status: DISCONTINUED | OUTPATIENT
Start: 2025-08-11 | End: 2025-08-11 | Stop reason: HOSPADM

## 2025-08-11 RX ORDER — OXYCODONE HYDROCHLORIDE 5 MG/1
10 TABLET ORAL EVERY 4 HOURS PRN
Status: DISCONTINUED | OUTPATIENT
Start: 2025-08-11 | End: 2025-08-11 | Stop reason: HOSPADM

## 2025-08-11 RX ORDER — PANTOPRAZOLE SODIUM 40 MG/1
40 TABLET, DELAYED RELEASE ORAL
Status: DISCONTINUED | OUTPATIENT
Start: 2025-08-11 | End: 2025-08-15 | Stop reason: HOSPADM

## 2025-08-11 RX ORDER — SODIUM CHLORIDE, SODIUM LACTATE, POTASSIUM CHLORIDE, CALCIUM CHLORIDE 600; 310; 30; 20 MG/100ML; MG/100ML; MG/100ML; MG/100ML
50 INJECTION, SOLUTION INTRAVENOUS CONTINUOUS
Status: CANCELLED | OUTPATIENT
Start: 2025-08-11 | End: 2025-08-12

## 2025-08-11 RX ORDER — POLYETHYLENE GLYCOL 3350 17 G/17G
17 POWDER, FOR SOLUTION ORAL DAILY
Status: DISCONTINUED | OUTPATIENT
Start: 2025-08-11 | End: 2025-08-15 | Stop reason: HOSPADM

## 2025-08-11 RX ORDER — MIDAZOLAM HYDROCHLORIDE 2 MG/2ML
INJECTION, SOLUTION INTRAMUSCULAR; INTRAVENOUS AS NEEDED
Status: DISCONTINUED | OUTPATIENT
Start: 2025-08-11 | End: 2025-08-11

## 2025-08-11 RX ORDER — HEPARIN SODIUM 10000 [USP'U]/100ML
0-4000 INJECTION, SOLUTION INTRAVENOUS CONTINUOUS
Qty: 1 ML | Refills: 0 | Status: SHIPPED | OUTPATIENT
Start: 2025-08-11 | End: 2025-08-15 | Stop reason: HOSPADM

## 2025-08-11 RX ORDER — ROSUVASTATIN CALCIUM 20 MG/1
20 TABLET, COATED ORAL DAILY
Status: DISCONTINUED | OUTPATIENT
Start: 2025-08-11 | End: 2025-08-13

## 2025-08-11 RX ORDER — PROPOFOL 10 MG/ML
INJECTION, EMULSION INTRAVENOUS AS NEEDED
Status: DISCONTINUED | OUTPATIENT
Start: 2025-08-11 | End: 2025-08-11

## 2025-08-11 RX ORDER — IPRATROPIUM BROMIDE AND ALBUTEROL SULFATE 2.5; .5 MG/3ML; MG/3ML
3 SOLUTION RESPIRATORY (INHALATION) EVERY 6 HOURS PRN
Status: DISCONTINUED | OUTPATIENT
Start: 2025-08-11 | End: 2025-08-15 | Stop reason: HOSPADM

## 2025-08-11 RX ORDER — HEPARIN SODIUM 10000 [USP'U]/100ML
0-4500 INJECTION, SOLUTION INTRAVENOUS CONTINUOUS
Status: DISCONTINUED | OUTPATIENT
Start: 2025-08-11 | End: 2025-08-13

## 2025-08-11 RX ORDER — OXYCODONE HYDROCHLORIDE 5 MG/1
5 TABLET ORAL EVERY 4 HOURS PRN
Status: DISCONTINUED | OUTPATIENT
Start: 2025-08-11 | End: 2025-08-11 | Stop reason: HOSPADM

## 2025-08-11 RX ORDER — PHENYLEPHRINE HYDROCHLORIDE 10 MG/ML
INJECTION INTRAVENOUS AS NEEDED
Status: DISCONTINUED | OUTPATIENT
Start: 2025-08-11 | End: 2025-08-11

## 2025-08-11 RX ADMIN — HEPARIN SODIUM 1400 UNITS/HR: 10000 INJECTION, SOLUTION INTRAVENOUS at 17:57

## 2025-08-11 RX ADMIN — MORPHINE SULFATE 2 MG: 2 INJECTION, SOLUTION INTRAMUSCULAR; INTRAVENOUS at 01:29

## 2025-08-11 RX ADMIN — FENTANYL CITRATE 100 MCG: 50 INJECTION, SOLUTION INTRAMUSCULAR; INTRAVENOUS at 12:39

## 2025-08-11 RX ADMIN — PHENYLEPHRINE HYDROCHLORIDE 120 MCG: 10 INJECTION INTRAVENOUS at 13:03

## 2025-08-11 RX ADMIN — PROPOFOL 20 MG: 10 INJECTION, EMULSION INTRAVENOUS at 12:41

## 2025-08-11 RX ADMIN — PHENYLEPHRINE HYDROCHLORIDE 160 MCG: 10 INJECTION INTRAVENOUS at 12:56

## 2025-08-11 RX ADMIN — CEFAZOLIN 2 G: 1 INJECTION, POWDER, FOR SOLUTION INTRAMUSCULAR; INTRAVENOUS at 12:50

## 2025-08-11 RX ADMIN — ROSUVASTATIN CALCIUM 20 MG: 20 TABLET, FILM COATED ORAL at 09:05

## 2025-08-11 RX ADMIN — PANTOPRAZOLE SODIUM 40 MG: 40 TABLET, DELAYED RELEASE ORAL at 09:05

## 2025-08-11 RX ADMIN — LIDOCAINE HYDROCHLORIDE 100 MG: 20 INJECTION INTRAVENOUS at 12:39

## 2025-08-11 RX ADMIN — MIDAZOLAM HYDROCHLORIDE 2 MG: 2 INJECTION, SOLUTION INTRAMUSCULAR; INTRAVENOUS at 12:17

## 2025-08-11 RX ADMIN — PHENYLEPHRINE HYDROCHLORIDE 120 MCG: 10 INJECTION INTRAVENOUS at 13:06

## 2025-08-11 RX ADMIN — ONDANSETRON 4 MG: 2 INJECTION, SOLUTION INTRAMUSCULAR; INTRAVENOUS at 13:10

## 2025-08-11 RX ADMIN — PROPOFOL 150 MG: 10 INJECTION, EMULSION INTRAVENOUS at 12:39

## 2025-08-11 RX ADMIN — METOPROLOL SUCCINATE 100 MG: 50 TABLET, EXTENDED RELEASE ORAL at 09:05

## 2025-08-11 RX ADMIN — ACETAMINOPHEN 650 MG: 325 TABLET ORAL at 20:04

## 2025-08-11 RX ADMIN — IOHEXOL 112 ML: 350 INJECTION, SOLUTION INTRAVENOUS at 06:24

## 2025-08-11 RX ADMIN — LOSARTAN POTASSIUM 100 MG: 50 TABLET, FILM COATED ORAL at 09:05

## 2025-08-11 RX ADMIN — PHENYLEPHRINE HYDROCHLORIDE 120 MCG: 10 INJECTION INTRAVENOUS at 12:50

## 2025-08-11 RX ADMIN — SODIUM CHLORIDE, SODIUM LACTATE, POTASSIUM CHLORIDE, AND CALCIUM CHLORIDE: 600; 310; 30; 20 INJECTION, SOLUTION INTRAVENOUS at 12:11

## 2025-08-11 RX ADMIN — DEXAMETHASONE SODIUM PHOSPHATE 4 MG: 4 INJECTION INTRA-ARTICULAR; INTRALESIONAL; INTRAMUSCULAR; INTRAVENOUS; SOFT TISSUE at 13:15

## 2025-08-11 RX ADMIN — HEPARIN SODIUM 1200 UNITS/HR: 10000 INJECTION, SOLUTION INTRAVENOUS at 16:59

## 2025-08-11 SDOH — ECONOMIC STABILITY: FOOD INSECURITY: WITHIN THE PAST 12 MONTHS, THE FOOD YOU BOUGHT JUST DIDN'T LAST AND YOU DIDN'T HAVE MONEY TO GET MORE.: PATIENT DECLINED

## 2025-08-11 SDOH — ECONOMIC STABILITY: FOOD INSECURITY
WITHIN THE PAST 12 MONTHS, YOU WORRIED THAT YOUR FOOD WOULD RUN OUT BEFORE YOU GOT THE MONEY TO BUY MORE.: PATIENT DECLINED

## 2025-08-11 SDOH — ECONOMIC STABILITY: HOUSING INSECURITY: IN THE PAST 12 MONTHS, HOW MANY TIMES HAVE YOU MOVED WHERE YOU WERE LIVING?: 0

## 2025-08-11 SDOH — SOCIAL STABILITY: SOCIAL INSECURITY
WITHIN THE LAST YEAR, HAVE YOU BEEN HUMILIATED OR EMOTIONALLY ABUSED IN OTHER WAYS BY YOUR PARTNER OR EX-PARTNER?: PATIENT DECLINED

## 2025-08-11 SDOH — ECONOMIC STABILITY: FOOD INSECURITY: HOW HARD IS IT FOR YOU TO PAY FOR THE VERY BASICS LIKE FOOD, HOUSING, MEDICAL CARE, AND HEATING?: PATIENT DECLINED

## 2025-08-11 SDOH — ECONOMIC STABILITY: HOUSING INSECURITY: AT ANY TIME IN THE PAST 12 MONTHS, WERE YOU HOMELESS OR LIVING IN A SHELTER (INCLUDING NOW)?: PATIENT DECLINED

## 2025-08-11 SDOH — SOCIAL STABILITY: SOCIAL INSECURITY
WITHIN THE LAST YEAR, HAVE YOU BEEN RAPED OR FORCED TO HAVE ANY KIND OF SEXUAL ACTIVITY BY YOUR PARTNER OR EX-PARTNER?: PATIENT DECLINED

## 2025-08-11 SDOH — SOCIAL STABILITY: SOCIAL INSECURITY
WITHIN THE LAST YEAR, HAVE YOU BEEN KICKED, HIT, SLAPPED, OR OTHERWISE PHYSICALLY HURT BY YOUR PARTNER OR EX-PARTNER?: PATIENT DECLINED

## 2025-08-11 SDOH — ECONOMIC STABILITY: HOUSING INSECURITY: IN THE LAST 12 MONTHS, WAS THERE A TIME WHEN YOU WERE NOT ABLE TO PAY THE MORTGAGE OR RENT ON TIME?: PATIENT DECLINED

## 2025-08-11 SDOH — SOCIAL STABILITY: SOCIAL INSECURITY: WITHIN THE LAST YEAR, HAVE YOU BEEN AFRAID OF YOUR PARTNER OR EX-PARTNER?: PATIENT DECLINED

## 2025-08-11 SDOH — ECONOMIC STABILITY: INCOME INSECURITY
IN THE PAST 12 MONTHS HAS THE ELECTRIC, GAS, OIL, OR WATER COMPANY THREATENED TO SHUT OFF SERVICES IN YOUR HOME?: PATIENT DECLINED

## 2025-08-11 SDOH — ECONOMIC STABILITY: TRANSPORTATION INSECURITY
IN THE PAST 12 MONTHS, HAS LACK OF TRANSPORTATION KEPT YOU FROM MEDICAL APPOINTMENTS OR FROM GETTING MEDICATIONS?: PATIENT DECLINED

## 2025-08-11 ASSESSMENT — COGNITIVE AND FUNCTIONAL STATUS - GENERAL
DAILY ACTIVITIY SCORE: 24
DAILY ACTIVITIY SCORE: 24
MOBILITY SCORE: 24
MOBILITY SCORE: 24
PATIENT BASELINE BEDBOUND: NO

## 2025-08-11 ASSESSMENT — PAIN SCALES - GENERAL
PAINLEVEL_OUTOF10: 0 - NO PAIN
PAINLEVEL_OUTOF10: 5 - MODERATE PAIN
PAINLEVEL_OUTOF10: 6
PAINLEVEL_OUTOF10: 7
PAINLEVEL_OUTOF10: 8
PAINLEVEL_OUTOF10: 0 - NO PAIN
PAIN_LEVEL: 0
PAINLEVEL_OUTOF10: 3
PAINLEVEL_OUTOF10: 0 - NO PAIN
PAINLEVEL_OUTOF10: 7
PAINLEVEL_OUTOF10: 5 - MODERATE PAIN

## 2025-08-11 ASSESSMENT — PAIN - FUNCTIONAL ASSESSMENT
PAIN_FUNCTIONAL_ASSESSMENT: 0-10

## 2025-08-11 ASSESSMENT — ACTIVITIES OF DAILY LIVING (ADL)
JUDGMENT_ADEQUATE_SAFELY_COMPLETE_DAILY_ACTIVITIES: YES
DRESSING YOURSELF: INDEPENDENT
BATHING: INDEPENDENT
HEARING - RIGHT EAR: FUNCTIONAL
HEARING - LEFT EAR: FUNCTIONAL
GROOMING: INDEPENDENT
LACK_OF_TRANSPORTATION: PATIENT DECLINED
TOILETING: INDEPENDENT
ADEQUATE_TO_COMPLETE_ADL: YES
PATIENT'S MEMORY ADEQUATE TO SAFELY COMPLETE DAILY ACTIVITIES?: YES
WALKS IN HOME: INDEPENDENT
FEEDING YOURSELF: INDEPENDENT

## 2025-08-11 ASSESSMENT — PAIN DESCRIPTION - LOCATION
LOCATION: LEG
LOCATION: LEG
LOCATION: FOOT

## 2025-08-11 ASSESSMENT — COLUMBIA-SUICIDE SEVERITY RATING SCALE - C-SSRS
6. HAVE YOU EVER DONE ANYTHING, STARTED TO DO ANYTHING, OR PREPARED TO DO ANYTHING TO END YOUR LIFE?: NO
2. HAVE YOU ACTUALLY HAD ANY THOUGHTS OF KILLING YOURSELF?: NO
1. IN THE PAST MONTH, HAVE YOU WISHED YOU WERE DEAD OR WISHED YOU COULD GO TO SLEEP AND NOT WAKE UP?: NO

## 2025-08-11 ASSESSMENT — PAIN DESCRIPTION - ORIENTATION
ORIENTATION: LEFT;RIGHT
ORIENTATION: RIGHT;LEFT
ORIENTATION: LEFT;RIGHT

## 2025-08-11 ASSESSMENT — PAIN DESCRIPTION - PAIN TYPE: TYPE: CHRONIC PAIN

## 2025-08-11 ASSESSMENT — PAIN DESCRIPTION - DESCRIPTORS
DESCRIPTORS: TIGHTNESS;CRAMPING
DESCRIPTORS: ACHING;SORE

## 2025-08-12 ENCOUNTER — TELEPHONE (OUTPATIENT)
Dept: CARDIOLOGY | Facility: CLINIC | Age: 60
End: 2025-08-12
Payer: COMMERCIAL

## 2025-08-12 ENCOUNTER — APPOINTMENT (OUTPATIENT)
Dept: VASCULAR MEDICINE | Facility: HOSPITAL | Age: 60
End: 2025-08-12
Payer: COMMERCIAL

## 2025-08-12 ENCOUNTER — ANESTHESIA (OUTPATIENT)
Dept: OPERATING ROOM | Facility: HOSPITAL | Age: 60
End: 2025-08-12
Payer: COMMERCIAL

## 2025-08-12 ENCOUNTER — ANESTHESIA EVENT (OUTPATIENT)
Dept: OPERATING ROOM | Facility: HOSPITAL | Age: 60
End: 2025-08-12
Payer: COMMERCIAL

## 2025-08-12 ENCOUNTER — SURGERY (OUTPATIENT)
Age: 60
End: 2025-08-12
Payer: COMMERCIAL

## 2025-08-12 LAB
ANION GAP SERPL CALC-SCNC: 12 MMOL/L (ref 10–20)
BUN SERPL-MCNC: 7 MG/DL (ref 6–23)
CALCIUM SERPL-MCNC: 9.2 MG/DL (ref 8.6–10.6)
CHLORIDE SERPL-SCNC: 101 MMOL/L (ref 98–107)
CO2 SERPL-SCNC: 27 MMOL/L (ref 21–32)
CREAT SERPL-MCNC: 0.6 MG/DL (ref 0.5–1.3)
EGFRCR SERPLBLD CKD-EPI 2021: >90 ML/MIN/1.73M*2
GLUCOSE SERPL-MCNC: 127 MG/DL (ref 74–99)
POTASSIUM SERPL-SCNC: 3.8 MMOL/L (ref 3.5–5.3)
SODIUM SERPL-SCNC: 136 MMOL/L (ref 136–145)
UFH PPP CHRO-ACNC: 0.3 IU/ML (ref ?–1.1)
UFH PPP CHRO-ACNC: 0.3 IU/ML (ref ?–1.1)

## 2025-08-12 PROCEDURE — 2500000001 HC RX 250 WO HCPCS SELF ADMINISTERED DRUGS (ALT 637 FOR MEDICARE OP)

## 2025-08-12 PROCEDURE — 36415 COLL VENOUS BLD VENIPUNCTURE: CPT | Performed by: STUDENT IN AN ORGANIZED HEALTH CARE EDUCATION/TRAINING PROGRAM

## 2025-08-12 PROCEDURE — 93970 EXTREMITY STUDY: CPT | Performed by: SURGERY

## 2025-08-12 PROCEDURE — 2500000002 HC RX 250 W HCPCS SELF ADMINISTERED DRUGS (ALT 637 FOR MEDICARE OP, ALT 636 FOR OP/ED): Performed by: STUDENT IN AN ORGANIZED HEALTH CARE EDUCATION/TRAINING PROGRAM

## 2025-08-12 PROCEDURE — 2500000001 HC RX 250 WO HCPCS SELF ADMINISTERED DRUGS (ALT 637 FOR MEDICARE OP): Performed by: STUDENT IN AN ORGANIZED HEALTH CARE EDUCATION/TRAINING PROGRAM

## 2025-08-12 PROCEDURE — 93970 EXTREMITY STUDY: CPT

## 2025-08-12 PROCEDURE — 80048 BASIC METABOLIC PNL TOTAL CA: CPT | Performed by: STUDENT IN AN ORGANIZED HEALTH CARE EDUCATION/TRAINING PROGRAM

## 2025-08-12 PROCEDURE — 1100000001 HC PRIVATE ROOM DAILY

## 2025-08-12 PROCEDURE — 2500000004 HC RX 250 GENERAL PHARMACY W/ HCPCS (ALT 636 FOR OP/ED): Performed by: STUDENT IN AN ORGANIZED HEALTH CARE EDUCATION/TRAINING PROGRAM

## 2025-08-12 PROCEDURE — 99233 SBSQ HOSP IP/OBS HIGH 50: CPT

## 2025-08-12 PROCEDURE — 85520 HEPARIN ASSAY: CPT | Performed by: STUDENT IN AN ORGANIZED HEALTH CARE EDUCATION/TRAINING PROGRAM

## 2025-08-12 RX ORDER — OXYCODONE HYDROCHLORIDE 5 MG/1
5 TABLET ORAL EVERY 6 HOURS PRN
Refills: 0 | Status: DISCONTINUED | OUTPATIENT
Start: 2025-08-12 | End: 2025-08-13

## 2025-08-12 RX ORDER — OXYCODONE HYDROCHLORIDE 5 MG/1
10 TABLET ORAL EVERY 6 HOURS PRN
Refills: 0 | Status: DISCONTINUED | OUTPATIENT
Start: 2025-08-12 | End: 2025-08-13

## 2025-08-12 RX ADMIN — LOSARTAN POTASSIUM 100 MG: 50 TABLET, FILM COATED ORAL at 09:51

## 2025-08-12 RX ADMIN — METOPROLOL SUCCINATE 100 MG: 50 TABLET, EXTENDED RELEASE ORAL at 09:50

## 2025-08-12 RX ADMIN — HEPARIN SODIUM 1500 UNITS/HR: 10000 INJECTION, SOLUTION INTRAVENOUS at 07:47

## 2025-08-12 RX ADMIN — OXYCODONE HYDROCHLORIDE 10 MG: 5 TABLET ORAL at 23:31

## 2025-08-12 RX ADMIN — ROSUVASTATIN CALCIUM 20 MG: 20 TABLET, FILM COATED ORAL at 09:51

## 2025-08-12 RX ADMIN — ACETAMINOPHEN 650 MG: 325 TABLET ORAL at 14:12

## 2025-08-12 RX ADMIN — OXYCODONE HYDROCHLORIDE 5 MG: 5 TABLET ORAL at 14:30

## 2025-08-12 ASSESSMENT — COGNITIVE AND FUNCTIONAL STATUS - GENERAL
DAILY ACTIVITIY SCORE: 24
DAILY ACTIVITIY SCORE: 24
MOBILITY SCORE: 24
MOBILITY SCORE: 24

## 2025-08-12 ASSESSMENT — PAIN DESCRIPTION - DESCRIPTORS
DESCRIPTORS: ACHING;SORE
DESCRIPTORS: PRESSURE;SHARP
DESCRIPTORS: PRESSURE;SHARP

## 2025-08-12 ASSESSMENT — PAIN - FUNCTIONAL ASSESSMENT
PAIN_FUNCTIONAL_ASSESSMENT: 0-10

## 2025-08-12 ASSESSMENT — PAIN SCALES - GENERAL
PAINLEVEL_OUTOF10: 4
PAINLEVEL_OUTOF10: 6
PAINLEVEL_OUTOF10: 4
PAINLEVEL_OUTOF10: 0 - NO PAIN
PAINLEVEL_OUTOF10: 6
PAINLEVEL_OUTOF10: 7

## 2025-08-12 ASSESSMENT — PAIN DESCRIPTION - LOCATION
LOCATION: LEG

## 2025-08-12 ASSESSMENT — PAIN DESCRIPTION - ORIENTATION: ORIENTATION: RIGHT;LEFT

## 2025-08-12 ASSESSMENT — ACTIVITIES OF DAILY LIVING (ADL): LACK_OF_TRANSPORTATION: NO

## 2025-08-13 ENCOUNTER — SURGERY (OUTPATIENT)
Age: 60
End: 2025-08-13
Payer: COMMERCIAL

## 2025-08-13 ENCOUNTER — ANESTHESIA (OUTPATIENT)
Dept: OPERATING ROOM | Facility: HOSPITAL | Age: 60
End: 2025-08-13
Payer: COMMERCIAL

## 2025-08-13 ENCOUNTER — APPOINTMENT (OUTPATIENT)
Dept: CARDIOLOGY | Facility: CLINIC | Age: 60
End: 2025-08-13
Payer: COMMERCIAL

## 2025-08-13 ENCOUNTER — ANESTHESIA EVENT (OUTPATIENT)
Dept: OPERATING ROOM | Facility: HOSPITAL | Age: 60
End: 2025-08-13
Payer: COMMERCIAL

## 2025-08-13 LAB
ACT BLD: 123 SEC (ref 83–199)
ACT BLD: 128 SEC (ref 83–199)
ACT BLD: 213 SEC (ref 83–199)
ACT BLD: 229 SEC (ref 83–199)
ACT BLD: 248 SEC (ref 83–199)
ACT BLD: 248 SEC (ref 83–199)
ACT BLD: 253 SEC (ref 83–199)
ACT BLD: 260 SEC (ref 83–199)
ACT BLD: 279 SEC (ref 83–199)
ANION GAP BLDA CALCULATED.4IONS-SCNC: 4 MMO/L (ref 10–25)
ANION GAP BLDA CALCULATED.4IONS-SCNC: 7 MMO/L (ref 10–25)
ANION GAP BLDA CALCULATED.4IONS-SCNC: 8 MMO/L (ref 10–25)
APTT PPP: 32 SECONDS (ref 26–36)
BASE EXCESS BLDA CALC-SCNC: 2.3 MMOL/L (ref -2–3)
BASE EXCESS BLDA CALC-SCNC: 3.2 MMOL/L (ref -2–3)
BASE EXCESS BLDA CALC-SCNC: 3.9 MMOL/L (ref -2–3)
BODY TEMPERATURE: 37 DEGREES CELSIUS
CA-I BLDA-SCNC: 1.13 MMOL/L (ref 1.1–1.33)
CA-I BLDA-SCNC: 1.14 MMOL/L (ref 1.1–1.33)
CA-I BLDA-SCNC: 1.17 MMOL/L (ref 1.1–1.33)
CHLORIDE BLDA-SCNC: 102 MMOL/L (ref 98–107)
CHLORIDE BLDA-SCNC: 102 MMOL/L (ref 98–107)
CHLORIDE BLDA-SCNC: 104 MMOL/L (ref 98–107)
ERYTHROCYTE [DISTWIDTH] IN BLOOD BY AUTOMATED COUNT: 12.8 % (ref 11.5–14.5)
GLUCOSE BLDA-MCNC: 115 MG/DL (ref 74–99)
GLUCOSE BLDA-MCNC: 140 MG/DL (ref 74–99)
GLUCOSE BLDA-MCNC: 148 MG/DL (ref 74–99)
HCO3 BLDA-SCNC: 27.3 MMOL/L (ref 22–26)
HCO3 BLDA-SCNC: 27.9 MMOL/L (ref 22–26)
HCO3 BLDA-SCNC: 28.5 MMOL/L (ref 22–26)
HCT VFR BLD AUTO: 39.8 % (ref 41–52)
HCT VFR BLD EST: 39 % (ref 41–52)
HCT VFR BLD EST: 39 % (ref 41–52)
HCT VFR BLD EST: 40 % (ref 41–52)
HGB BLD-MCNC: 13 G/DL (ref 13.5–17.5)
HGB BLDA-MCNC: 13 G/DL (ref 13.5–17.5)
HGB BLDA-MCNC: 13.1 G/DL (ref 13.5–17.5)
HGB BLDA-MCNC: 13.2 G/DL (ref 13.5–17.5)
INHALED O2 CONCENTRATION: 50 %
LACTATE BLDA-SCNC: 0.7 MMOL/L (ref 0.4–2)
LACTATE BLDA-SCNC: 0.8 MMOL/L (ref 0.4–2)
LACTATE BLDA-SCNC: 1.2 MMOL/L (ref 0.4–2)
MCH RBC QN AUTO: 31.3 PG (ref 26–34)
MCHC RBC AUTO-ENTMCNC: 32.7 G/DL (ref 32–36)
MCV RBC AUTO: 96 FL (ref 80–100)
NRBC BLD-RTO: 0 /100 WBCS (ref 0–0)
OXYHGB MFR BLDA: 95.1 % (ref 94–98)
OXYHGB MFR BLDA: 96.7 % (ref 94–98)
OXYHGB MFR BLDA: 98.1 % (ref 94–98)
PCO2 BLDA: 42 MM HG (ref 38–42)
PCO2 BLDA: 42 MM HG (ref 38–42)
PCO2 BLDA: 43 MM HG (ref 38–42)
PH BLDA: 7.41 PH (ref 7.38–7.42)
PH BLDA: 7.43 PH (ref 7.38–7.42)
PH BLDA: 7.44 PH (ref 7.38–7.42)
PLATELET # BLD AUTO: 199 X10*3/UL (ref 150–450)
PO2 BLDA: 106 MM HG (ref 85–95)
PO2 BLDA: 118 MM HG (ref 85–95)
PO2 BLDA: 87 MM HG (ref 85–95)
POTASSIUM BLDA-SCNC: 3.4 MMOL/L (ref 3.5–5.3)
POTASSIUM BLDA-SCNC: 3.8 MMOL/L (ref 3.5–5.3)
POTASSIUM BLDA-SCNC: 4.3 MMOL/L (ref 3.5–5.3)
RBC # BLD AUTO: 4.15 X10*6/UL (ref 4.5–5.9)
SAO2 % BLDA: 100 % (ref 94–100)
SAO2 % BLDA: 98 % (ref 94–100)
SAO2 % BLDA: 99 % (ref 94–100)
SODIUM BLDA-SCNC: 133 MMOL/L (ref 136–145)
UFH PPP CHRO-ACNC: 0.2 IU/ML (ref ?–1.1)
WBC # BLD AUTO: 9.1 X10*3/UL (ref 4.4–11.3)

## 2025-08-13 PROCEDURE — 3600000009 HC OR TIME - EACH INCREMENTAL 1 MINUTE - PROCEDURE LEVEL FOUR: Performed by: SURGERY

## 2025-08-13 PROCEDURE — 84132 ASSAY OF SERUM POTASSIUM: CPT

## 2025-08-13 PROCEDURE — 2500000001 HC RX 250 WO HCPCS SELF ADMINISTERED DRUGS (ALT 637 FOR MEDICARE OP)

## 2025-08-13 PROCEDURE — 2500000004 HC RX 250 GENERAL PHARMACY W/ HCPCS (ALT 636 FOR OP/ED): Mod: JW

## 2025-08-13 PROCEDURE — 35556 ART BYP GRFT FEM-POPLITEAL: CPT | Performed by: SURGERY

## 2025-08-13 PROCEDURE — 85730 THROMBOPLASTIN TIME PARTIAL: CPT | Performed by: STUDENT IN AN ORGANIZED HEALTH CARE EDUCATION/TRAINING PROGRAM

## 2025-08-13 PROCEDURE — 3700000001 HC GENERAL ANESTHESIA TIME - INITIAL BASE CHARGE: Performed by: SURGERY

## 2025-08-13 PROCEDURE — 2500000004 HC RX 250 GENERAL PHARMACY W/ HCPCS (ALT 636 FOR OP/ED): Performed by: STUDENT IN AN ORGANIZED HEALTH CARE EDUCATION/TRAINING PROGRAM

## 2025-08-13 PROCEDURE — 2500000002 HC RX 250 W HCPCS SELF ADMINISTERED DRUGS (ALT 637 FOR MEDICARE OP, ALT 636 FOR OP/ED): Performed by: STUDENT IN AN ORGANIZED HEALTH CARE EDUCATION/TRAINING PROGRAM

## 2025-08-13 PROCEDURE — 3600000005 HC OR TIME - INITIAL BASE CHARGE - PROCEDURE LEVEL FIVE: Performed by: SURGERY

## 2025-08-13 PROCEDURE — 3600000010 HC OR TIME - EACH INCREMENTAL 1 MINUTE - PROCEDURE LEVEL FIVE: Performed by: SURGERY

## 2025-08-13 PROCEDURE — 2500000001 HC RX 250 WO HCPCS SELF ADMINISTERED DRUGS (ALT 637 FOR MEDICARE OP): Performed by: STUDENT IN AN ORGANIZED HEALTH CARE EDUCATION/TRAINING PROGRAM

## 2025-08-13 PROCEDURE — 36415 COLL VENOUS BLD VENIPUNCTURE: CPT | Performed by: STUDENT IN AN ORGANIZED HEALTH CARE EDUCATION/TRAINING PROGRAM

## 2025-08-13 PROCEDURE — 84132 ASSAY OF SERUM POTASSIUM: CPT | Performed by: STUDENT IN AN ORGANIZED HEALTH CARE EDUCATION/TRAINING PROGRAM

## 2025-08-13 PROCEDURE — 85520 HEPARIN ASSAY: CPT | Performed by: STUDENT IN AN ORGANIZED HEALTH CARE EDUCATION/TRAINING PROGRAM

## 2025-08-13 PROCEDURE — 2500000004 HC RX 250 GENERAL PHARMACY W/ HCPCS (ALT 636 FOR OP/ED)

## 2025-08-13 PROCEDURE — 2060000001 HC INTERMEDIATE ICU ROOM DAILY

## 2025-08-13 PROCEDURE — 2500000004 HC RX 250 GENERAL PHARMACY W/ HCPCS (ALT 636 FOR OP/ED): Performed by: SURGERY

## 2025-08-13 PROCEDURE — 99233 SBSQ HOSP IP/OBS HIGH 50: CPT

## 2025-08-13 PROCEDURE — 2500000004 HC RX 250 GENERAL PHARMACY W/ HCPCS (ALT 636 FOR OP/ED): Mod: JZ | Performed by: STUDENT IN AN ORGANIZED HEALTH CARE EDUCATION/TRAINING PROGRAM

## 2025-08-13 PROCEDURE — 85027 COMPLETE CBC AUTOMATED: CPT | Performed by: STUDENT IN AN ORGANIZED HEALTH CARE EDUCATION/TRAINING PROGRAM

## 2025-08-13 PROCEDURE — 2500000005 HC RX 250 GENERAL PHARMACY W/O HCPCS: Performed by: STUDENT IN AN ORGANIZED HEALTH CARE EDUCATION/TRAINING PROGRAM

## 2025-08-13 PROCEDURE — 041L09L BYPASS LEFT FEMORAL ARTERY TO POPLITEAL ARTERY WITH AUTOLOGOUS VENOUS TISSUE, OPEN APPROACH: ICD-10-PCS | Performed by: SURGERY

## 2025-08-13 PROCEDURE — 2720000007 HC OR 272 NO HCPCS: Performed by: SURGERY

## 2025-08-13 PROCEDURE — 2500000004 HC RX 250 GENERAL PHARMACY W/ HCPCS (ALT 636 FOR OP/ED): Mod: JZ

## 2025-08-13 PROCEDURE — 3700000002 HC GENERAL ANESTHESIA TIME - EACH INCREMENTAL 1 MINUTE: Performed by: SURGERY

## 2025-08-13 PROCEDURE — 7100000002 HC RECOVERY ROOM TIME - EACH INCREMENTAL 1 MINUTE: Performed by: SURGERY

## 2025-08-13 PROCEDURE — 3600000004 HC OR TIME - INITIAL BASE CHARGE - PROCEDURE LEVEL FOUR: Performed by: SURGERY

## 2025-08-13 PROCEDURE — 06BQ0ZZ EXCISION OF LEFT SAPHENOUS VEIN, OPEN APPROACH: ICD-10-PCS | Performed by: SURGERY

## 2025-08-13 PROCEDURE — 85347 COAGULATION TIME ACTIVATED: CPT

## 2025-08-13 PROCEDURE — 36620 INSERTION CATHETER ARTERY: CPT | Performed by: STUDENT IN AN ORGANIZED HEALTH CARE EDUCATION/TRAINING PROGRAM

## 2025-08-13 PROCEDURE — 2780000003 HC OR 278 NO HCPCS: Performed by: SURGERY

## 2025-08-13 PROCEDURE — 7100000001 HC RECOVERY ROOM TIME - INITIAL BASE CHARGE: Performed by: SURGERY

## 2025-08-13 PROCEDURE — 2500000005 HC RX 250 GENERAL PHARMACY W/O HCPCS: Performed by: SURGERY

## 2025-08-13 RX ORDER — CEFAZOLIN 1 G/1
INJECTION, POWDER, FOR SOLUTION INTRAVENOUS AS NEEDED
Status: DISCONTINUED | OUTPATIENT
Start: 2025-08-13 | End: 2025-08-13

## 2025-08-13 RX ORDER — SODIUM CHLORIDE 0.9 G/100ML
INJECTION, SOLUTION IRRIGATION AS NEEDED
Status: DISCONTINUED | OUTPATIENT
Start: 2025-08-13 | End: 2025-08-13 | Stop reason: HOSPADM

## 2025-08-13 RX ORDER — ROCURONIUM BROMIDE 10 MG/ML
INJECTION, SOLUTION INTRAVENOUS AS NEEDED
Status: DISCONTINUED | OUTPATIENT
Start: 2025-08-13 | End: 2025-08-13

## 2025-08-13 RX ORDER — ACETAMINOPHEN 325 MG/1
650 TABLET ORAL EVERY 4 HOURS PRN
Status: DISCONTINUED | OUTPATIENT
Start: 2025-08-13 | End: 2025-08-13 | Stop reason: HOSPADM

## 2025-08-13 RX ORDER — HEPARIN SODIUM 1000 [USP'U]/ML
INJECTION, SOLUTION INTRAVENOUS; SUBCUTANEOUS AS NEEDED
Status: DISCONTINUED | OUTPATIENT
Start: 2025-08-13 | End: 2025-08-13

## 2025-08-13 RX ORDER — METOPROLOL TARTRATE 50 MG/1
50 TABLET ORAL 2 TIMES DAILY
Status: DISCONTINUED | OUTPATIENT
Start: 2025-08-13 | End: 2025-08-15 | Stop reason: HOSPADM

## 2025-08-13 RX ORDER — MAGNESIUM SULFATE HEPTAHYDRATE 500 MG/ML
INJECTION, SOLUTION INTRAMUSCULAR; INTRAVENOUS AS NEEDED
Status: DISCONTINUED | OUTPATIENT
Start: 2025-08-13 | End: 2025-08-13

## 2025-08-13 RX ORDER — PROPOFOL 10 MG/ML
INJECTION, EMULSION INTRAVENOUS AS NEEDED
Status: DISCONTINUED | OUTPATIENT
Start: 2025-08-13 | End: 2025-08-13

## 2025-08-13 RX ORDER — NORETHINDRONE AND ETHINYL ESTRADIOL 0.5-0.035
KIT ORAL AS NEEDED
Status: DISCONTINUED | OUTPATIENT
Start: 2025-08-13 | End: 2025-08-13

## 2025-08-13 RX ORDER — HYDROMORPHONE HYDROCHLORIDE 0.2 MG/ML
0.2 INJECTION INTRAMUSCULAR; INTRAVENOUS; SUBCUTANEOUS EVERY 5 MIN PRN
Status: DISCONTINUED | OUTPATIENT
Start: 2025-08-13 | End: 2025-08-13 | Stop reason: HOSPADM

## 2025-08-13 RX ORDER — HYDROMORPHONE HYDROCHLORIDE 1 MG/ML
INJECTION, SOLUTION INTRAMUSCULAR; INTRAVENOUS; SUBCUTANEOUS AS NEEDED
Status: DISCONTINUED | OUTPATIENT
Start: 2025-08-13 | End: 2025-08-13

## 2025-08-13 RX ORDER — METOCLOPRAMIDE HYDROCHLORIDE 5 MG/ML
10 INJECTION INTRAMUSCULAR; INTRAVENOUS ONCE AS NEEDED
Status: DISCONTINUED | OUTPATIENT
Start: 2025-08-13 | End: 2025-08-13 | Stop reason: HOSPADM

## 2025-08-13 RX ORDER — PHENYLEPHRINE 10 MG/250 ML(40 MCG/ML)IN 0.9 % SOD.CHLORIDE INTRAVENOUS
CONTINUOUS PRN
Status: DISCONTINUED | OUTPATIENT
Start: 2025-08-13 | End: 2025-08-13

## 2025-08-13 RX ORDER — ONDANSETRON HYDROCHLORIDE 2 MG/ML
4 INJECTION, SOLUTION INTRAVENOUS ONCE AS NEEDED
Status: DISCONTINUED | OUTPATIENT
Start: 2025-08-13 | End: 2025-08-13 | Stop reason: HOSPADM

## 2025-08-13 RX ORDER — ALBUTEROL SULFATE 0.83 MG/ML
2.5 SOLUTION RESPIRATORY (INHALATION) ONCE AS NEEDED
Status: DISCONTINUED | OUTPATIENT
Start: 2025-08-13 | End: 2025-08-13 | Stop reason: HOSPADM

## 2025-08-13 RX ORDER — OXYCODONE HYDROCHLORIDE 5 MG/1
10 TABLET ORAL EVERY 4 HOURS PRN
Status: DISCONTINUED | OUTPATIENT
Start: 2025-08-13 | End: 2025-08-13 | Stop reason: HOSPADM

## 2025-08-13 RX ORDER — CEFAZOLIN SODIUM 2 G/100ML
2 INJECTION, SOLUTION INTRAVENOUS EVERY 8 HOURS
Status: COMPLETED | OUTPATIENT
Start: 2025-08-13 | End: 2025-08-14

## 2025-08-13 RX ORDER — LABETALOL HYDROCHLORIDE 5 MG/ML
5 INJECTION, SOLUTION INTRAVENOUS ONCE AS NEEDED
Status: DISCONTINUED | OUTPATIENT
Start: 2025-08-13 | End: 2025-08-13 | Stop reason: HOSPADM

## 2025-08-13 RX ORDER — OXYCODONE HYDROCHLORIDE 5 MG/1
5 TABLET ORAL EVERY 4 HOURS PRN
Status: DISCONTINUED | OUTPATIENT
Start: 2025-08-13 | End: 2025-08-13 | Stop reason: HOSPADM

## 2025-08-13 RX ORDER — MIDAZOLAM HYDROCHLORIDE 2 MG/2ML
INJECTION, SOLUTION INTRAMUSCULAR; INTRAVENOUS AS NEEDED
Status: DISCONTINUED | OUTPATIENT
Start: 2025-08-13 | End: 2025-08-13

## 2025-08-13 RX ORDER — DEXMEDETOMIDINE HYDROCHLORIDE 4 UG/ML
INJECTION, SOLUTION INTRAVENOUS CONTINUOUS PRN
Status: DISCONTINUED | OUTPATIENT
Start: 2025-08-13 | End: 2025-08-13

## 2025-08-13 RX ORDER — SODIUM CHLORIDE, SODIUM LACTATE, POTASSIUM CHLORIDE, CALCIUM CHLORIDE 600; 310; 30; 20 MG/100ML; MG/100ML; MG/100ML; MG/100ML
INJECTION, SOLUTION INTRAVENOUS CONTINUOUS PRN
Status: DISCONTINUED | OUTPATIENT
Start: 2025-08-13 | End: 2025-08-13

## 2025-08-13 RX ORDER — OXYCODONE HYDROCHLORIDE 5 MG/1
10 TABLET ORAL EVERY 4 HOURS PRN
Status: DISCONTINUED | OUTPATIENT
Start: 2025-08-13 | End: 2025-08-15 | Stop reason: HOSPADM

## 2025-08-13 RX ORDER — NITROGLYCERIN 20 MG/100ML
INJECTION INTRAVENOUS AS NEEDED
Status: DISCONTINUED | OUTPATIENT
Start: 2025-08-13 | End: 2025-08-13

## 2025-08-13 RX ORDER — ACETAMINOPHEN 10 MG/ML
INJECTION, SOLUTION INTRAVENOUS AS NEEDED
Status: DISCONTINUED | OUTPATIENT
Start: 2025-08-13 | End: 2025-08-13

## 2025-08-13 RX ORDER — GLYCOPYRROLATE 0.2 MG/ML
INJECTION INTRAMUSCULAR; INTRAVENOUS AS NEEDED
Status: DISCONTINUED | OUTPATIENT
Start: 2025-08-13 | End: 2025-08-13

## 2025-08-13 RX ORDER — SODIUM CHLORIDE, SODIUM LACTATE, POTASSIUM CHLORIDE, CALCIUM CHLORIDE 600; 310; 30; 20 MG/100ML; MG/100ML; MG/100ML; MG/100ML
100 INJECTION, SOLUTION INTRAVENOUS CONTINUOUS
Status: ACTIVE | OUTPATIENT
Start: 2025-08-13 | End: 2025-08-13

## 2025-08-13 RX ORDER — POTASSIUM CHLORIDE 14.9 MG/ML
INJECTION INTRAVENOUS AS NEEDED
Status: DISCONTINUED | OUTPATIENT
Start: 2025-08-13 | End: 2025-08-13

## 2025-08-13 RX ORDER — PHENYLEPHRINE HCL IN 0.9% NACL 0.4MG/10ML
SYRINGE (ML) INTRAVENOUS AS NEEDED
Status: DISCONTINUED | OUTPATIENT
Start: 2025-08-13 | End: 2025-08-13

## 2025-08-13 RX ORDER — OXYCODONE HYDROCHLORIDE 5 MG/1
5 TABLET ORAL EVERY 4 HOURS PRN
Status: DISCONTINUED | OUTPATIENT
Start: 2025-08-13 | End: 2025-08-15 | Stop reason: HOSPADM

## 2025-08-13 RX ORDER — ROSUVASTATIN CALCIUM 20 MG/1
40 TABLET, COATED ORAL DAILY
Status: DISCONTINUED | OUTPATIENT
Start: 2025-08-14 | End: 2025-08-15 | Stop reason: HOSPADM

## 2025-08-13 RX ORDER — FENTANYL CITRATE 50 UG/ML
INJECTION, SOLUTION INTRAMUSCULAR; INTRAVENOUS AS NEEDED
Status: DISCONTINUED | OUTPATIENT
Start: 2025-08-13 | End: 2025-08-13

## 2025-08-13 RX ORDER — PROTAMINE SULFATE 10 MG/ML
INJECTION, SOLUTION INTRAVENOUS AS NEEDED
Status: DISCONTINUED | OUTPATIENT
Start: 2025-08-13 | End: 2025-08-13

## 2025-08-13 RX ORDER — LIDOCAINE HYDROCHLORIDE 20 MG/ML
INJECTION, SOLUTION INFILTRATION; PERINEURAL AS NEEDED
Status: DISCONTINUED | OUTPATIENT
Start: 2025-08-13 | End: 2025-08-13

## 2025-08-13 RX ORDER — HEPARIN SODIUM 10000 [USP'U]/100ML
500 INJECTION, SOLUTION INTRAVENOUS CONTINUOUS
Status: DISCONTINUED | OUTPATIENT
Start: 2025-08-13 | End: 2025-08-15

## 2025-08-13 RX ORDER — LIDOCAINE HYDROCHLORIDE 10 MG/ML
0.1 INJECTION, SOLUTION INFILTRATION; PERINEURAL ONCE
Status: DISCONTINUED | OUTPATIENT
Start: 2025-08-13 | End: 2025-08-13 | Stop reason: HOSPADM

## 2025-08-13 RX ADMIN — DEXMEDETOMIDINE HYDROCHLORIDE 0.2 MCG/KG/HR: 4 INJECTION, SOLUTION INTRAVENOUS at 09:52

## 2025-08-13 RX ADMIN — EPHEDRINE SULFATE 5 MG: 50 INJECTION INTRAVENOUS at 11:46

## 2025-08-13 RX ADMIN — Medication 40 MCG: at 09:23

## 2025-08-13 RX ADMIN — METOPROLOL SUCCINATE 100 MG: 50 TABLET, EXTENDED RELEASE ORAL at 08:03

## 2025-08-13 RX ADMIN — NITROGLYCERIN 50 MCG: 20 INJECTION INTRAVENOUS at 10:39

## 2025-08-13 RX ADMIN — HEPARIN SODIUM 5000 UNITS: 5000 INJECTION INTRAVENOUS; SUBCUTANEOUS at 09:59

## 2025-08-13 RX ADMIN — SODIUM CHLORIDE 1000 ML: 900 IRRIGANT IRRIGATION at 09:59

## 2025-08-13 RX ADMIN — LOSARTAN POTASSIUM 100 MG: 50 TABLET, FILM COATED ORAL at 08:03

## 2025-08-13 RX ADMIN — ACETAMINOPHEN 1000 MG: 10 INJECTION, SOLUTION INTRAVENOUS at 13:36

## 2025-08-13 RX ADMIN — HYDROMORPHONE HYDROCHLORIDE 0.2 MG: 0.2 INJECTION INTRAMUSCULAR; INTRAVENOUS; SUBCUTANEOUS at 14:50

## 2025-08-13 RX ADMIN — ROCURONIUM BROMIDE 100 MG: 10 INJECTION, SOLUTION INTRAVENOUS at 09:23

## 2025-08-13 RX ADMIN — HYDROMORPHONE HYDROCHLORIDE 0.2 MG: 1 INJECTION, SOLUTION INTRAMUSCULAR; INTRAVENOUS; SUBCUTANEOUS at 12:37

## 2025-08-13 RX ADMIN — EPHEDRINE SULFATE 5 MG: 50 INJECTION INTRAVENOUS at 13:25

## 2025-08-13 RX ADMIN — HEPARIN SODIUM 2000 UNITS: 1000 INJECTION INTRAVENOUS; SUBCUTANEOUS at 10:51

## 2025-08-13 RX ADMIN — ONDANSETRON 4 MG: 2 INJECTION, SOLUTION INTRAMUSCULAR; INTRAVENOUS at 13:33

## 2025-08-13 RX ADMIN — Medication: at 09:59

## 2025-08-13 RX ADMIN — HYDROMORPHONE HYDROCHLORIDE 0.5 MG: 1 INJECTION, SOLUTION INTRAMUSCULAR; INTRAVENOUS; SUBCUTANEOUS at 14:41

## 2025-08-13 RX ADMIN — CEFAZOLIN 2 G: 1 INJECTION, POWDER, FOR SOLUTION INTRAMUSCULAR; INTRAVENOUS at 13:26

## 2025-08-13 RX ADMIN — EPHEDRINE SULFATE 5 MG: 50 INJECTION INTRAVENOUS at 10:34

## 2025-08-13 RX ADMIN — ROCURONIUM BROMIDE 10 MG: 10 INJECTION, SOLUTION INTRAVENOUS at 10:21

## 2025-08-13 RX ADMIN — HYDROMORPHONE HYDROCHLORIDE 0.6 MG: 1 INJECTION, SOLUTION INTRAMUSCULAR; INTRAVENOUS; SUBCUTANEOUS at 13:50

## 2025-08-13 RX ADMIN — EPHEDRINE SULFATE 5 MG: 50 INJECTION INTRAVENOUS at 10:14

## 2025-08-13 RX ADMIN — ROCURONIUM BROMIDE 20 MG: 10 INJECTION, SOLUTION INTRAVENOUS at 11:59

## 2025-08-13 RX ADMIN — PROPOFOL 200 MG: 10 INJECTION, EMULSION INTRAVENOUS at 09:23

## 2025-08-13 RX ADMIN — OXYCODONE HYDROCHLORIDE 10 MG: 5 TABLET ORAL at 20:39

## 2025-08-13 RX ADMIN — MAGNESIUM SULFATE HEPTAHYDRATE 2 G: 500 INJECTION, SOLUTION INTRAMUSCULAR; INTRAVENOUS at 11:12

## 2025-08-13 RX ADMIN — POTASSIUM CHLORIDE 20 MEQ: 14.9 INJECTION, SOLUTION INTRAVENOUS at 11:05

## 2025-08-13 RX ADMIN — PROTAMINE SULFATE 40 MG: 10 INJECTION, SOLUTION INTRAVENOUS at 13:30

## 2025-08-13 RX ADMIN — CEFAZOLIN 2 G: 1 INJECTION, POWDER, FOR SOLUTION INTRAMUSCULAR; INTRAVENOUS at 09:31

## 2025-08-13 RX ADMIN — ROCURONIUM BROMIDE 20 MG: 10 INJECTION, SOLUTION INTRAVENOUS at 11:15

## 2025-08-13 RX ADMIN — SUGAMMADEX 200 MG: 100 INJECTION, SOLUTION INTRAVENOUS at 14:17

## 2025-08-13 RX ADMIN — HYDROMORPHONE HYDROCHLORIDE 0.2 MG: 1 INJECTION, SOLUTION INTRAMUSCULAR; INTRAVENOUS; SUBCUTANEOUS at 13:25

## 2025-08-13 RX ADMIN — HEPARIN SODIUM 3000 UNITS: 1000 INJECTION INTRAVENOUS; SUBCUTANEOUS at 11:33

## 2025-08-13 RX ADMIN — ROCURONIUM BROMIDE 20 MG: 10 INJECTION, SOLUTION INTRAVENOUS at 12:55

## 2025-08-13 RX ADMIN — HYDROMORPHONE HYDROCHLORIDE 0.2 MG: 1 INJECTION, SOLUTION INTRAMUSCULAR; INTRAVENOUS; SUBCUTANEOUS at 23:22

## 2025-08-13 RX ADMIN — EPHEDRINE SULFATE 5 MG: 50 INJECTION INTRAVENOUS at 13:34

## 2025-08-13 RX ADMIN — GLYCOPYRROLATE 0.2 MG: 0.2 SOLUTION INTRAMUSCULAR; INTRAVENOUS at 09:25

## 2025-08-13 RX ADMIN — METOPROLOL TARTRATE 50 MG: 50 TABLET, FILM COATED ORAL at 20:39

## 2025-08-13 RX ADMIN — PHENYLEPHRINE-NACL IV SOLUTION 10 MG/250ML-0.9% 0.2 MCG/KG/MIN: 10-0.9/25 SOLUTION at 10:48

## 2025-08-13 RX ADMIN — HEPARIN SODIUM 4000 UNITS: 1000 INJECTION INTRAVENOUS; SUBCUTANEOUS at 12:07

## 2025-08-13 RX ADMIN — FENTANYL CITRATE 100 MCG: 50 INJECTION, SOLUTION INTRAMUSCULAR; INTRAVENOUS at 09:23

## 2025-08-13 RX ADMIN — MIDAZOLAM HYDROCHLORIDE 2 MG: 2 INJECTION, SOLUTION INTRAMUSCULAR; INTRAVENOUS at 09:07

## 2025-08-13 RX ADMIN — HEPARIN SODIUM 500 UNITS/HR: 10000 INJECTION, SOLUTION INTRAVENOUS at 20:21

## 2025-08-13 RX ADMIN — LIDOCAINE HYDROCHLORIDE 100 MG: 20 INJECTION, SOLUTION INFILTRATION; PERINEURAL at 09:23

## 2025-08-13 RX ADMIN — GLYCOPYRROLATE 0.4 MG: 0.2 SOLUTION INTRAMUSCULAR; INTRAVENOUS at 10:48

## 2025-08-13 RX ADMIN — Medication 1 DOSE: at 14:45

## 2025-08-13 RX ADMIN — SODIUM CHLORIDE, POTASSIUM CHLORIDE, SODIUM LACTATE AND CALCIUM CHLORIDE: 600; 310; 30; 20 INJECTION, SOLUTION INTRAVENOUS at 09:07

## 2025-08-13 RX ADMIN — HYDROMORPHONE HYDROCHLORIDE 0.5 MG: 1 INJECTION, SOLUTION INTRAMUSCULAR; INTRAVENOUS; SUBCUTANEOUS at 14:24

## 2025-08-13 RX ADMIN — HEPARIN SODIUM 500 UNITS/HR: 10000 INJECTION, SOLUTION INTRAVENOUS at 18:24

## 2025-08-13 RX ADMIN — OXYCODONE HYDROCHLORIDE 10 MG: 5 TABLET ORAL at 08:02

## 2025-08-13 RX ADMIN — ROSUVASTATIN CALCIUM 20 MG: 20 TABLET, FILM COATED ORAL at 08:03

## 2025-08-13 RX ADMIN — HEPARIN SODIUM 1500 UNITS/HR: 10000 INJECTION, SOLUTION INTRAVENOUS at 01:53

## 2025-08-13 RX ADMIN — CEFAZOLIN SODIUM 2 G: 2 INJECTION, SOLUTION INTRAVENOUS at 23:00

## 2025-08-13 RX ADMIN — DEXAMETHASONE SODIUM PHOSPHATE 4 MG: 4 INJECTION INTRA-ARTICULAR; INTRALESIONAL; INTRAMUSCULAR; INTRAVENOUS; SOFT TISSUE at 09:33

## 2025-08-13 RX ADMIN — NITROGLYCERIN 50 MCG: 20 INJECTION INTRAVENOUS at 12:32

## 2025-08-13 RX ADMIN — HEPARIN SODIUM 9000 UNITS: 1000 INJECTION INTRAVENOUS; SUBCUTANEOUS at 10:40

## 2025-08-13 RX ADMIN — SODIUM CHLORIDE, POTASSIUM CHLORIDE, SODIUM LACTATE AND CALCIUM CHLORIDE: 600; 310; 30; 20 INJECTION, SOLUTION INTRAVENOUS at 13:41

## 2025-08-13 RX ADMIN — EPHEDRINE SULFATE 5 MG: 50 INJECTION INTRAVENOUS at 12:51

## 2025-08-13 SDOH — HEALTH STABILITY: MENTAL HEALTH: CURRENT SMOKER: 0

## 2025-08-13 ASSESSMENT — PAIN DESCRIPTION - ORIENTATION
ORIENTATION: LEFT
ORIENTATION: RIGHT;LEFT
ORIENTATION: LEFT
ORIENTATION: LEFT

## 2025-08-13 ASSESSMENT — COGNITIVE AND FUNCTIONAL STATUS - GENERAL
STANDING UP FROM CHAIR USING ARMS: A LOT
TURNING FROM BACK TO SIDE WHILE IN FLAT BAD: A LITTLE
DRESSING REGULAR LOWER BODY CLOTHING: A LITTLE
DRESSING REGULAR UPPER BODY CLOTHING: A LITTLE
DAILY ACTIVITIY SCORE: 20
HELP NEEDED FOR BATHING: A LITTLE
CLIMB 3 TO 5 STEPS WITH RAILING: A LOT
WALKING IN HOSPITAL ROOM: A LOT
MOVING FROM LYING ON BACK TO SITTING ON SIDE OF FLAT BED WITH BEDRAILS: A LITTLE
MOVING TO AND FROM BED TO CHAIR: A LOT
MOBILITY SCORE: 14
TOILETING: A LITTLE

## 2025-08-13 ASSESSMENT — PAIN DESCRIPTION - LOCATION
LOCATION: LEG
LOCATION: FOOT
LOCATION: LEG
LOCATION: LEG

## 2025-08-13 ASSESSMENT — PAIN - FUNCTIONAL ASSESSMENT
PAIN_FUNCTIONAL_ASSESSMENT: 0-10
PAIN_FUNCTIONAL_ASSESSMENT: UNABLE TO SELF-REPORT
PAIN_FUNCTIONAL_ASSESSMENT: 0-10
PAIN_FUNCTIONAL_ASSESSMENT: UNABLE TO SELF-REPORT
PAIN_FUNCTIONAL_ASSESSMENT: 0-10

## 2025-08-13 ASSESSMENT — PAIN SCALES - GENERAL
PAINLEVEL_OUTOF10: 0 - NO PAIN
PAINLEVEL_OUTOF10: 3
PAINLEVEL_OUTOF10: 2
PAINLEVEL_OUTOF10: 0 - NO PAIN
PAINLEVEL_OUTOF10: 3
PAINLEVEL_OUTOF10: 3
PAINLEVEL_OUTOF10: 0 - NO PAIN
PAIN_LEVEL: 0
PAINLEVEL_OUTOF10: 7
PAINLEVEL_OUTOF10: 0 - NO PAIN
PAINLEVEL_OUTOF10: 0 - NO PAIN
PAINLEVEL_OUTOF10: 5 - MODERATE PAIN
PAINLEVEL_OUTOF10: 2
PAINLEVEL_OUTOF10: 9
PAINLEVEL_OUTOF10: 10 - WORST POSSIBLE PAIN
PAINLEVEL_OUTOF10: 4
PAINLEVEL_OUTOF10: 0 - NO PAIN
PAINLEVEL_OUTOF10: 10 - WORST POSSIBLE PAIN

## 2025-08-13 ASSESSMENT — PAIN DESCRIPTION - DESCRIPTORS
DESCRIPTORS: BURNING;THROBBING
DESCRIPTORS: ACHING;SHARP
DESCRIPTORS: PRESSURE;ACHING
DESCRIPTORS: ACHING;SHARP

## 2025-08-14 ENCOUNTER — APPOINTMENT (OUTPATIENT)
Dept: VASCULAR MEDICINE | Facility: HOSPITAL | Age: 60
End: 2025-08-14
Payer: COMMERCIAL

## 2025-08-14 LAB
ANION GAP SERPL CALC-SCNC: 13 MMOL/L (ref 10–20)
APTT PPP: 23 SECONDS (ref 26–36)
BUN SERPL-MCNC: 10 MG/DL (ref 6–23)
CALCIUM SERPL-MCNC: 8.8 MG/DL (ref 8.6–10.6)
CHLORIDE SERPL-SCNC: 99 MMOL/L (ref 98–107)
CO2 SERPL-SCNC: 26 MMOL/L (ref 21–32)
CREAT SERPL-MCNC: 0.76 MG/DL (ref 0.5–1.3)
EGFRCR SERPLBLD CKD-EPI 2021: >90 ML/MIN/1.73M*2
ERYTHROCYTE [DISTWIDTH] IN BLOOD BY AUTOMATED COUNT: 13 % (ref 11.5–14.5)
GLUCOSE BLD MANUAL STRIP-MCNC: 141 MG/DL (ref 74–99)
GLUCOSE SERPL-MCNC: 136 MG/DL (ref 74–99)
HCT VFR BLD AUTO: 36.6 % (ref 41–52)
HGB BLD-MCNC: 11.6 G/DL (ref 13.5–17.5)
INR PPP: 1 (ref 0.9–1.1)
MAGNESIUM SERPL-MCNC: 2.2 MG/DL (ref 1.6–2.4)
MCH RBC QN AUTO: 31.4 PG (ref 26–34)
MCHC RBC AUTO-ENTMCNC: 31.7 G/DL (ref 32–36)
MCV RBC AUTO: 99 FL (ref 80–100)
NRBC BLD-RTO: 0 /100 WBCS (ref 0–0)
PLATELET # BLD AUTO: 202 X10*3/UL (ref 150–450)
POTASSIUM SERPL-SCNC: 3.7 MMOL/L (ref 3.5–5.3)
PROTHROMBIN TIME: 11.1 SECONDS (ref 9.8–12.4)
RBC # BLD AUTO: 3.7 X10*6/UL (ref 4.5–5.9)
SODIUM SERPL-SCNC: 134 MMOL/L (ref 136–145)
WBC # BLD AUTO: 11.2 X10*3/UL (ref 4.4–11.3)

## 2025-08-14 PROCEDURE — 93922 UPR/L XTREMITY ART 2 LEVELS: CPT

## 2025-08-14 PROCEDURE — 83735 ASSAY OF MAGNESIUM: CPT

## 2025-08-14 PROCEDURE — 82947 ASSAY GLUCOSE BLOOD QUANT: CPT

## 2025-08-14 PROCEDURE — 97116 GAIT TRAINING THERAPY: CPT | Mod: GP

## 2025-08-14 PROCEDURE — 99233 SBSQ HOSP IP/OBS HIGH 50: CPT | Performed by: NURSE PRACTITIONER

## 2025-08-14 PROCEDURE — 2500000004 HC RX 250 GENERAL PHARMACY W/ HCPCS (ALT 636 FOR OP/ED)

## 2025-08-14 PROCEDURE — 2500000001 HC RX 250 WO HCPCS SELF ADMINISTERED DRUGS (ALT 637 FOR MEDICARE OP): Performed by: NURSE PRACTITIONER

## 2025-08-14 PROCEDURE — 2500000001 HC RX 250 WO HCPCS SELF ADMINISTERED DRUGS (ALT 637 FOR MEDICARE OP)

## 2025-08-14 PROCEDURE — 37799 UNLISTED PX VASCULAR SURGERY: CPT

## 2025-08-14 PROCEDURE — 97161 PT EVAL LOW COMPLEX 20 MIN: CPT | Mod: GP

## 2025-08-14 PROCEDURE — 93922 UPR/L XTREMITY ART 2 LEVELS: CPT | Performed by: INTERNAL MEDICINE

## 2025-08-14 PROCEDURE — 80048 BASIC METABOLIC PNL TOTAL CA: CPT

## 2025-08-14 PROCEDURE — 85730 THROMBOPLASTIN TIME PARTIAL: CPT

## 2025-08-14 PROCEDURE — 97530 THERAPEUTIC ACTIVITIES: CPT | Mod: GP

## 2025-08-14 PROCEDURE — 85027 COMPLETE CBC AUTOMATED: CPT

## 2025-08-14 PROCEDURE — 1200000002 HC GENERAL ROOM WITH TELEMETRY DAILY

## 2025-08-14 PROCEDURE — 2500000002 HC RX 250 W HCPCS SELF ADMINISTERED DRUGS (ALT 637 FOR MEDICARE OP, ALT 636 FOR OP/ED)

## 2025-08-14 RX ORDER — CLOPIDOGREL BISULFATE 75 MG/1
75 TABLET ORAL DAILY
Status: DISCONTINUED | OUTPATIENT
Start: 2025-08-14 | End: 2025-08-15 | Stop reason: HOSPADM

## 2025-08-14 RX ORDER — DEXTROSE 50 % IN WATER (D50W) INTRAVENOUS SYRINGE
12.5
Status: DISCONTINUED | OUTPATIENT
Start: 2025-08-14 | End: 2025-08-15 | Stop reason: HOSPADM

## 2025-08-14 RX ORDER — DEXTROSE 50 % IN WATER (D50W) INTRAVENOUS SYRINGE
25
Status: DISCONTINUED | OUTPATIENT
Start: 2025-08-14 | End: 2025-08-15 | Stop reason: HOSPADM

## 2025-08-14 RX ORDER — ACETAMINOPHEN 325 MG/1
650 TABLET ORAL EVERY 6 HOURS
Status: DISCONTINUED | OUTPATIENT
Start: 2025-08-14 | End: 2025-08-15 | Stop reason: HOSPADM

## 2025-08-14 RX ORDER — INSULIN LISPRO 100 [IU]/ML
0-5 INJECTION, SOLUTION INTRAVENOUS; SUBCUTANEOUS
Status: DISCONTINUED | OUTPATIENT
Start: 2025-08-14 | End: 2025-08-15 | Stop reason: HOSPADM

## 2025-08-14 RX ORDER — GABAPENTIN 100 MG/1
100 CAPSULE ORAL EVERY 8 HOURS SCHEDULED
Status: DISCONTINUED | OUTPATIENT
Start: 2025-08-14 | End: 2025-08-15 | Stop reason: HOSPADM

## 2025-08-14 RX ADMIN — ROSUVASTATIN CALCIUM 40 MG: 20 TABLET, FILM COATED ORAL at 08:32

## 2025-08-14 RX ADMIN — ACETAMINOPHEN 650 MG: 325 TABLET ORAL at 21:26

## 2025-08-14 RX ADMIN — OXYCODONE HYDROCHLORIDE 10 MG: 5 TABLET ORAL at 21:25

## 2025-08-14 RX ADMIN — ACETAMINOPHEN 650 MG: 325 TABLET ORAL at 15:07

## 2025-08-14 RX ADMIN — GABAPENTIN 100 MG: 100 CAPSULE ORAL at 21:26

## 2025-08-14 RX ADMIN — OXYCODONE HYDROCHLORIDE 5 MG: 5 TABLET ORAL at 02:05

## 2025-08-14 RX ADMIN — LEVOTHYROXINE SODIUM 75 MCG: 0.07 TABLET ORAL at 06:00

## 2025-08-14 RX ADMIN — METOPROLOL TARTRATE 50 MG: 50 TABLET, FILM COATED ORAL at 08:32

## 2025-08-14 RX ADMIN — PANTOPRAZOLE SODIUM 40 MG: 40 TABLET, DELAYED RELEASE ORAL at 06:01

## 2025-08-14 RX ADMIN — CEFAZOLIN SODIUM 2 G: 2 INJECTION, SOLUTION INTRAVENOUS at 06:01

## 2025-08-14 RX ADMIN — OXYCODONE HYDROCHLORIDE 10 MG: 5 TABLET ORAL at 15:07

## 2025-08-14 RX ADMIN — LOSARTAN POTASSIUM 100 MG: 50 TABLET, FILM COATED ORAL at 08:32

## 2025-08-14 RX ADMIN — METOPROLOL TARTRATE 50 MG: 50 TABLET, FILM COATED ORAL at 21:26

## 2025-08-14 RX ADMIN — CLOPIDOGREL BISULFATE 75 MG: 75 TABLET, FILM COATED ORAL at 08:32

## 2025-08-14 ASSESSMENT — COGNITIVE AND FUNCTIONAL STATUS - GENERAL
TOILETING: A LITTLE
WALKING IN HOSPITAL ROOM: A LITTLE
CLIMB 3 TO 5 STEPS WITH RAILING: A LOT
MOBILITY SCORE: 14
MOVING TO AND FROM BED TO CHAIR: A LITTLE
STANDING UP FROM CHAIR USING ARMS: A LITTLE
WALKING IN HOSPITAL ROOM: A LOT
STANDING UP FROM CHAIR USING ARMS: A LOT
DRESSING REGULAR UPPER BODY CLOTHING: A LITTLE
HELP NEEDED FOR BATHING: A LITTLE
DRESSING REGULAR LOWER BODY CLOTHING: A LITTLE
MOVING TO AND FROM BED TO CHAIR: A LOT
MOBILITY SCORE: 17
DAILY ACTIVITIY SCORE: 20
CLIMB 3 TO 5 STEPS WITH RAILING: A LOT
TURNING FROM BACK TO SIDE WHILE IN FLAT BAD: A LITTLE
MOVING FROM LYING ON BACK TO SITTING ON SIDE OF FLAT BED WITH BEDRAILS: A LITTLE
TURNING FROM BACK TO SIDE WHILE IN FLAT BAD: A LITTLE
MOVING FROM LYING ON BACK TO SITTING ON SIDE OF FLAT BED WITH BEDRAILS: A LITTLE

## 2025-08-14 ASSESSMENT — ACTIVITIES OF DAILY LIVING (ADL)
ADL_ASSISTANCE: INDEPENDENT
LACK_OF_TRANSPORTATION: NO

## 2025-08-14 ASSESSMENT — PAIN - FUNCTIONAL ASSESSMENT
PAIN_FUNCTIONAL_ASSESSMENT: 0-10

## 2025-08-14 ASSESSMENT — PAIN DESCRIPTION - LOCATION
LOCATION: LEG

## 2025-08-14 ASSESSMENT — PAIN SCALES - GENERAL
PAINLEVEL_OUTOF10: 0 - NO PAIN
PAINLEVEL_OUTOF10: 6
PAINLEVEL_OUTOF10: 9
PAINLEVEL_OUTOF10: 7
PAINLEVEL_OUTOF10: 7
PAINLEVEL_OUTOF10: 3

## 2025-08-14 ASSESSMENT — PAIN DESCRIPTION - ORIENTATION
ORIENTATION: LEFT

## 2025-08-14 ASSESSMENT — PAIN DESCRIPTION - DESCRIPTORS
DESCRIPTORS: SHOOTING;SHARP;ACHING
DESCRIPTORS: SHOOTING;SHARP

## 2025-08-15 ENCOUNTER — HOME HEALTH ADMISSION (OUTPATIENT)
Dept: HOME HEALTH SERVICES | Facility: HOME HEALTH | Age: 60
End: 2025-08-15
Payer: COMMERCIAL

## 2025-08-15 ENCOUNTER — DOCUMENTATION (OUTPATIENT)
Dept: HOME HEALTH SERVICES | Facility: HOME HEALTH | Age: 60
End: 2025-08-15
Payer: COMMERCIAL

## 2025-08-15 VITALS
DIASTOLIC BLOOD PRESSURE: 55 MMHG | HEIGHT: 72 IN | HEART RATE: 69 BPM | SYSTOLIC BLOOD PRESSURE: 104 MMHG | WEIGHT: 231.48 LBS | BODY MASS INDEX: 31.35 KG/M2 | TEMPERATURE: 97.3 F | RESPIRATION RATE: 14 BRPM | OXYGEN SATURATION: 98 %

## 2025-08-15 PROBLEM — M79.672 LEFT FOOT PAIN: Status: RESOLVED | Noted: 2025-08-11 | Resolved: 2025-08-15

## 2025-08-15 LAB
GLUCOSE BLD MANUAL STRIP-MCNC: 133 MG/DL (ref 74–99)
GLUCOSE BLD MANUAL STRIP-MCNC: 158 MG/DL (ref 74–99)

## 2025-08-15 PROCEDURE — 2500000001 HC RX 250 WO HCPCS SELF ADMINISTERED DRUGS (ALT 637 FOR MEDICARE OP): Performed by: NURSE PRACTITIONER

## 2025-08-15 PROCEDURE — 2500000001 HC RX 250 WO HCPCS SELF ADMINISTERED DRUGS (ALT 637 FOR MEDICARE OP)

## 2025-08-15 PROCEDURE — 97165 OT EVAL LOW COMPLEX 30 MIN: CPT | Mod: GO

## 2025-08-15 PROCEDURE — 99239 HOSP IP/OBS DSCHRG MGMT >30: CPT | Performed by: NURSE PRACTITIONER

## 2025-08-15 PROCEDURE — 2500000002 HC RX 250 W HCPCS SELF ADMINISTERED DRUGS (ALT 637 FOR MEDICARE OP, ALT 636 FOR OP/ED)

## 2025-08-15 PROCEDURE — 97116 GAIT TRAINING THERAPY: CPT | Mod: GP

## 2025-08-15 PROCEDURE — 2500000004 HC RX 250 GENERAL PHARMACY W/ HCPCS (ALT 636 FOR OP/ED): Performed by: NURSE PRACTITIONER

## 2025-08-15 PROCEDURE — 82947 ASSAY GLUCOSE BLOOD QUANT: CPT

## 2025-08-15 PROCEDURE — 97530 THERAPEUTIC ACTIVITIES: CPT | Mod: GP

## 2025-08-15 RX ORDER — PANTOPRAZOLE SODIUM 20 MG/1
20 TABLET, DELAYED RELEASE ORAL
Qty: 30 TABLET | Refills: 1 | Status: SHIPPED | OUTPATIENT
Start: 2025-08-15

## 2025-08-15 RX ORDER — CLOPIDOGREL BISULFATE 75 MG/1
75 TABLET ORAL DAILY
Qty: 90 TABLET | Refills: 3 | Status: SHIPPED | OUTPATIENT
Start: 2025-08-16

## 2025-08-15 RX ORDER — POLYETHYLENE GLYCOL 3350 17 G/17G
17 POWDER, FOR SOLUTION ORAL DAILY PRN
Start: 2025-08-15

## 2025-08-15 RX ORDER — RIVAROXABAN 2.5 MG/1
2.5 TABLET, FILM COATED ORAL
Qty: 60 TABLET | Refills: 11 | Status: SHIPPED | OUTPATIENT
Start: 2025-08-15

## 2025-08-15 RX ORDER — ACETAMINOPHEN 325 MG/1
650 TABLET ORAL EVERY 6 HOURS PRN
Start: 2025-08-15

## 2025-08-15 RX ORDER — HEPARIN SODIUM 10000 [USP'U]/100ML
500 INJECTION, SOLUTION INTRAVENOUS CONTINUOUS
Status: DISCONTINUED | OUTPATIENT
Start: 2025-08-15 | End: 2025-08-15

## 2025-08-15 RX ORDER — RIVAROXABAN 2.5 MG/1
2.5 TABLET, FILM COATED ORAL
Qty: 60 TABLET | Refills: 11 | Status: SHIPPED | OUTPATIENT
Start: 2025-08-15 | End: 2025-08-15

## 2025-08-15 RX ORDER — OXYCODONE HYDROCHLORIDE 5 MG/1
5 TABLET ORAL EVERY 4 HOURS PRN
Qty: 28 TABLET | Refills: 0 | Status: SHIPPED | OUTPATIENT
Start: 2025-08-15 | End: 2025-08-22

## 2025-08-15 RX ORDER — GABAPENTIN 100 MG/1
100 CAPSULE ORAL EVERY 8 HOURS SCHEDULED
Qty: 90 CAPSULE | Refills: 0 | Status: SHIPPED | OUTPATIENT
Start: 2025-08-15

## 2025-08-15 RX ORDER — RIVAROXABAN 2.5 MG/1
2.5 TABLET, FILM COATED ORAL
Status: DISCONTINUED | OUTPATIENT
Start: 2025-08-15 | End: 2025-08-15 | Stop reason: HOSPADM

## 2025-08-15 RX ADMIN — LEVOTHYROXINE SODIUM 75 MCG: 0.07 TABLET ORAL at 06:18

## 2025-08-15 RX ADMIN — CLOPIDOGREL BISULFATE 75 MG: 75 TABLET, FILM COATED ORAL at 08:20

## 2025-08-15 RX ADMIN — GABAPENTIN 100 MG: 100 CAPSULE ORAL at 06:18

## 2025-08-15 RX ADMIN — HEPARIN SODIUM 500 UNITS/HR: 10000 INJECTION, SOLUTION INTRAVENOUS at 09:52

## 2025-08-15 RX ADMIN — METOPROLOL TARTRATE 50 MG: 50 TABLET, FILM COATED ORAL at 08:20

## 2025-08-15 RX ADMIN — LOSARTAN POTASSIUM 100 MG: 50 TABLET, FILM COATED ORAL at 08:20

## 2025-08-15 RX ADMIN — PANTOPRAZOLE SODIUM 40 MG: 40 TABLET, DELAYED RELEASE ORAL at 06:18

## 2025-08-15 RX ADMIN — RIVAROXABAN 2.5 MG: 2.5 TABLET, FILM COATED ORAL at 11:19

## 2025-08-15 RX ADMIN — OXYCODONE HYDROCHLORIDE 10 MG: 5 TABLET ORAL at 08:52

## 2025-08-15 RX ADMIN — ACETAMINOPHEN 650 MG: 325 TABLET ORAL at 08:20

## 2025-08-15 RX ADMIN — ROSUVASTATIN CALCIUM 40 MG: 20 TABLET, FILM COATED ORAL at 08:20

## 2025-08-15 ASSESSMENT — PAIN SCALES - GENERAL
PAINLEVEL_OUTOF10: 4
PAINLEVEL_OUTOF10: 9
PAINLEVEL_OUTOF10: 6
PAINLEVEL_OUTOF10: 8
PAINLEVEL_OUTOF10: 6

## 2025-08-15 ASSESSMENT — COGNITIVE AND FUNCTIONAL STATUS - GENERAL
MOBILITY SCORE: 20
DRESSING REGULAR UPPER BODY CLOTHING: A LITTLE
MOVING FROM LYING ON BACK TO SITTING ON SIDE OF FLAT BED WITH BEDRAILS: A LITTLE
TOILETING: A LITTLE
HELP NEEDED FOR BATHING: A LITTLE
CLIMB 3 TO 5 STEPS WITH RAILING: A LITTLE
STANDING UP FROM CHAIR USING ARMS: A LITTLE
MOVING TO AND FROM BED TO CHAIR: A LITTLE
DRESSING REGULAR UPPER BODY CLOTHING: A LITTLE
STANDING UP FROM CHAIR USING ARMS: A LITTLE
MOBILITY SCORE: 18
HELP NEEDED FOR BATHING: A LITTLE
DAILY ACTIVITIY SCORE: 20
CLIMB 3 TO 5 STEPS WITH RAILING: A LITTLE
MOVING TO AND FROM BED TO CHAIR: A LITTLE
PERSONAL GROOMING: A LITTLE
WALKING IN HOSPITAL ROOM: A LITTLE
TURNING FROM BACK TO SIDE WHILE IN FLAT BAD: A LITTLE
DRESSING REGULAR LOWER BODY CLOTHING: A LITTLE
WALKING IN HOSPITAL ROOM: A LITTLE
DRESSING REGULAR LOWER BODY CLOTHING: A LITTLE
TOILETING: A LITTLE
DAILY ACTIVITIY SCORE: 19

## 2025-08-15 ASSESSMENT — PAIN - FUNCTIONAL ASSESSMENT
PAIN_FUNCTIONAL_ASSESSMENT: 0-10

## 2025-08-15 ASSESSMENT — ACTIVITIES OF DAILY LIVING (ADL)
ADL_ASSISTANCE: INDEPENDENT
BATHING_ASSISTANCE: MINIMAL

## 2025-08-18 ENCOUNTER — TELEPHONE (OUTPATIENT)
Dept: HOME HEALTH SERVICES | Facility: HOME HEALTH | Age: 60
End: 2025-08-18
Payer: COMMERCIAL

## 2025-08-25 ENCOUNTER — OFFICE VISIT (OUTPATIENT)
Dept: VASCULAR SURGERY | Facility: HOSPITAL | Age: 60
End: 2025-08-25
Payer: COMMERCIAL

## 2025-08-25 VITALS
HEIGHT: 72 IN | OXYGEN SATURATION: 98 % | BODY MASS INDEX: 31.51 KG/M2 | HEART RATE: 85 BPM | WEIGHT: 232.6 LBS | SYSTOLIC BLOOD PRESSURE: 117 MMHG | DIASTOLIC BLOOD PRESSURE: 76 MMHG

## 2025-08-25 DIAGNOSIS — Z95.828 S/P FEMORAL-POPLITEAL BYPASS SURGERY: ICD-10-CM

## 2025-08-25 DIAGNOSIS — I73.9 PAD (PERIPHERAL ARTERY DISEASE): Primary | ICD-10-CM

## 2025-08-25 PROCEDURE — 99024 POSTOP FOLLOW-UP VISIT: CPT | Performed by: NURSE PRACTITIONER

## 2025-08-25 PROCEDURE — 99213 OFFICE O/P EST LOW 20 MIN: CPT | Performed by: NURSE PRACTITIONER

## 2025-08-25 RX ORDER — OXYCODONE HYDROCHLORIDE 5 MG/1
5 TABLET ORAL EVERY 6 HOURS PRN
Qty: 28 TABLET | Refills: 0 | Status: SHIPPED | OUTPATIENT
Start: 2025-08-25 | End: 2025-09-01

## 2025-08-25 RX ORDER — GABAPENTIN 100 MG/1
300 CAPSULE ORAL EVERY 8 HOURS SCHEDULED
Qty: 90 CAPSULE | Refills: 3 | Status: SHIPPED | OUTPATIENT
Start: 2025-08-25

## 2025-08-25 ASSESSMENT — PAIN SCALES - GENERAL: PAINLEVEL_OUTOF10: 6

## 2025-08-30 DIAGNOSIS — E11.40 TYPE 2 DIABETES MELLITUS WITH DIABETIC NEUROPATHY, UNSPECIFIED WHETHER LONG TERM INSULIN USE (MULTI): Primary | ICD-10-CM

## 2025-09-05 ENCOUNTER — OFFICE VISIT (OUTPATIENT)
Dept: PRIMARY CARE | Facility: CLINIC | Age: 60
End: 2025-09-05
Payer: COMMERCIAL

## 2025-09-05 VITALS
SYSTOLIC BLOOD PRESSURE: 126 MMHG | HEIGHT: 72 IN | WEIGHT: 227 LBS | DIASTOLIC BLOOD PRESSURE: 78 MMHG | BODY MASS INDEX: 30.75 KG/M2

## 2025-09-05 DIAGNOSIS — I10 HYPERTENSION, UNSPECIFIED TYPE: ICD-10-CM

## 2025-09-05 DIAGNOSIS — E11.40 TYPE 2 DIABETES MELLITUS WITH DIABETIC NEUROPATHY, UNSPECIFIED WHETHER LONG TERM INSULIN USE (MULTI): ICD-10-CM

## 2025-09-05 DIAGNOSIS — E03.9 HYPOTHYROIDISM, UNSPECIFIED TYPE: ICD-10-CM

## 2025-09-05 DIAGNOSIS — R60.9 EDEMA, UNSPECIFIED TYPE: ICD-10-CM

## 2025-09-05 DIAGNOSIS — E78.00 HIGH CHOLESTEROL: ICD-10-CM

## 2025-09-05 DIAGNOSIS — R25.2 LEG CRAMPS: Primary | ICD-10-CM

## 2025-09-05 DIAGNOSIS — K21.9 GASTROESOPHAGEAL REFLUX DISEASE WITHOUT ESOPHAGITIS: ICD-10-CM

## 2025-09-05 DIAGNOSIS — Z79.01 ANTICOAGULATION ADEQUATE: ICD-10-CM

## 2025-09-05 DIAGNOSIS — I73.9 PERIPHERAL VASCULAR DISEASE: Primary | ICD-10-CM

## 2025-09-05 DIAGNOSIS — Z87.891 SMOKING HX: ICD-10-CM

## 2025-09-05 RX ORDER — CILOSTAZOL 100 MG/1
100 TABLET ORAL 2 TIMES DAILY
Qty: 60 TABLET | Refills: 2 | Status: SHIPPED | OUTPATIENT
Start: 2025-09-05 | End: 2026-09-05

## 2025-09-05 RX ORDER — ROPINIROLE 0.5 MG/1
0.5 TABLET, FILM COATED ORAL 3 TIMES DAILY
Qty: 90 TABLET | Refills: 0 | Status: SHIPPED | OUTPATIENT
Start: 2025-09-05 | End: 2026-09-05

## 2025-09-05 RX ORDER — CALCIUM CARBONATE 300MG(750)
400 TABLET,CHEWABLE ORAL DAILY
Qty: 30 TABLET | Refills: 0 | Status: SHIPPED | OUTPATIENT
Start: 2025-09-05

## (undated) DEVICE — Device

## (undated) DEVICE — SHEATHS, GREEN, TUNNELER, SMALL

## (undated) DEVICE — DRAPE, PAD, PREP, W/ 9 IN CUFF, 24 X 41, LF, NS

## (undated) DEVICE — SUTURE, VICRYL, 3-0, 27 IN, SH

## (undated) DEVICE — DRAPE, SHEET, FAN FOLDED, HALF, 44 X 58 IN, DISPOSABLE, LF, STERILE

## (undated) DEVICE — COVER PROBE, SOFT FLEX W/ GEL, 5 X 48 IN (13X122CM)

## (undated) DEVICE — SHEATH, PINNACLE, W/.038 GUIDEWIRE, 10 CM,  7FR INTRODUCER, 7FR DIA, 2.5 CM DIALATOR

## (undated) DEVICE — CATHETER TRAY, SURESTEP, 16FR, URINE METER W/STATLOCK

## (undated) DEVICE — PROTECTOR, NERVE, ULNAR, PINK

## (undated) DEVICE — SHEATH, PINNACLE, 10 CM,  5FR INTRODUCER, 5FR DIA, 2.5 CM DIALATOR

## (undated) DEVICE — SHEATH, PINNACLE, W/.038 GUIDEWIRE, 10 CM,  6FR INTRODUCER, 6FR DIA, 2.5 CM DIALATOR

## (undated) DEVICE — STRAP, CIRCUMFERENTIAL, 2 X 76""

## (undated) DEVICE — CATHETER, QUICKCROSS SUPPORT .035 X 135CM

## (undated) DEVICE — SHEATH, PINNACLE, W/.038 GW 10CM, 5FR INTRODUCER, 2.5 CM DIALATOR

## (undated) DEVICE — GUIDEWIRE, ADVANTAGE, .035 X 260

## (undated) DEVICE — EXTENSION SET W/MALE LUER LOCK ADAPTER, VOLUME 2.4ML

## (undated) DEVICE — INTRODUCER SET, MICROPUNCT, STIFF, 4FR 10CM,PLATINUM TIP,NITINOLWIRE

## (undated) DEVICE — PACK, ANGIO P2, CUSTOM, LAKE

## (undated) DEVICE — GUIDEWIRE, ANGLE TIP,  .035 DIA, 260 CM, 3 CM TIP"

## (undated) DEVICE — MICROINTRODUCER KIT, VSI, 4FR X 40CM, STIFFEN

## (undated) DEVICE — GUIDEWIRE, .035 X 180 BENTSON STR

## (undated) DEVICE — CATHETER, 5 FR. 65CM, ANGLE TAPER AT TIP

## (undated) DEVICE — GLOVE, SURGICAL, PROTEXIS PI MICRO, 5.5, PF, LF

## (undated) DEVICE — STAPLER, SKIN PROXIMATE, 35 WIDE

## (undated) DEVICE — SYRINGE, 20 CC, LUER LOCK, MONOJECT, W/O CAP, LF

## (undated) DEVICE — DRAPE, INCISE, ANTIMICROBIAL, IOBAN 2, LARGE, 17 X 23 IN, DISPOSABLE, STERILE

## (undated) DEVICE — INFLATION KIT, ADVANTAGE, ENCORE 26 (1/BOX)

## (undated) DEVICE — CATHETER KIT, ASPIRATION, 044 CORONARY SYSTEM, 140CM

## (undated) DEVICE — CATHETER, TEMPO, UNIV FLUSH, 4FR, 65CM

## (undated) DEVICE — SHEATH, PINNACLE, W/O GUIDEWIRE, 25 CM,  6FR INTRODUCER, 5FR DIA, 2.5 CM DIALATOR

## (undated) DEVICE — COVER, CART, 45 X 27 X 48 IN, CLEAR

## (undated) DEVICE — SUTURE, PROLENE, 6-0, 30 IN, BV-1 BV-1

## (undated) DEVICE — DRESSING, ISLAND, ADHESIVE, TELFA, 4 X 8 IN

## (undated) DEVICE — TOWEL, SURGICAL, NEURO, O/R, 16 X 26, BLUE, STERILE

## (undated) DEVICE — APPLIER, LIGACLIP, MULTIPLE, SMALL 9-3/8IN

## (undated) DEVICE — CATHETER, NAVICROSS, 0.035 X 90CM, ANGLED TIP

## (undated) DEVICE — SEALANT, HEMOSTATIC, FLOSEAL, 10 ML

## (undated) DEVICE — GUIDEWIRE, EXCHANGE, HEAVY DUTY, CURVED, COATED, ROSEN, 0.035 IN X 260 CM

## (undated) DEVICE — GUIDEWIRE, HI-TORQUE, COMMAND ES, 0.014 X 300CM

## (undated) DEVICE — MANIFOLD, 4 PORT NEPTUNE STANDARD

## (undated) DEVICE — DRAPE, C-ARM IMAGE

## (undated) DEVICE — STOCKINETTE, DOUBLE PLY, 6 X 48 IN, STERILE

## (undated) DEVICE — GOWN, SURGICAL, SMARTGOWN, XLARGE, STERILE

## (undated) DEVICE — DRAPE, ISOLATION, BAG, DRAW STRING CLOSURE, STERI DRAPE, LARGE, 20 X 20 IN, DISPOSABLE, STERILE

## (undated) DEVICE — BOLSTER, HIP

## (undated) DEVICE — GUIDEWIRE, MANDRIL, COPE, W/NITINOL, 0.018 IN X 60 CM

## (undated) DEVICE — WOUND SYSTEM, DEBRIDEMENT & CLEANING, O.R DUOPAK

## (undated) DEVICE — TRAY, MINOR, SINGLE BASIN, STERILE

## (undated) DEVICE — SPONGE, HEMOSTATIC, GELATIN, SURGIFOAM, 8 X 12.5 CM X 10 MM

## (undated) DEVICE — TAPE, UMBILICAL, 1/8 X 30 IN, MULTIPACK, COTTON, WHITE

## (undated) DEVICE — CONTROL WIRE, .018 X 300

## (undated) DEVICE — DEVICE, CLOSURE, PERCLOSE, PROSTYLE

## (undated) DEVICE — STOPCOCK, 4 WAY, SMALL BODY, W/SWIVEL, ULTRA, LIPD RESISTANT, LUER LOCK, MALE, LF

## (undated) DEVICE — TOWEL, OR XRAY, RFID DETECT, WHITE, 17X26, STERILE

## (undated) DEVICE — PROBE, KOVEN VASCULAR, SINGLE USE

## (undated) DEVICE — TORQUE DEVICE, ACCOMODATES WIRES W/DIA .010 TO .038"."

## (undated) DEVICE — GUIDEWIRE, STIFF SHAFT, ANGLE TIP, .035 DIA, 260 CM,  5 CM TIP"

## (undated) DEVICE — SHEATH, 45CM W/DILATOR, 6FR ST STYLE W/CROSS-CUT VALVE

## (undated) DEVICE — SUTURE, PROLENE, 5-0, 36 IN, C-1, CV-11, BLUE

## (undated) DEVICE — GEL, ULTRASOUND, AQUASONIC 100, 20 GM, STERILE

## (undated) DEVICE — DRAPE, MAGENTIC INSTRUMENT, 12X16

## (undated) DEVICE — CANISTER, INDIGO MAX, FOR PMXENGN, NON-STERILE

## (undated) DEVICE — APPLICATOR, CHLORAPREP, W/ORANGE TINT, 26ML

## (undated) DEVICE — CATHETER, SOFT VU, NON-BRAID FLUSH, .035, 5FR, 65CM, 6-SIDEHOLE